# Patient Record
Sex: FEMALE | Race: WHITE | NOT HISPANIC OR LATINO | Employment: FULL TIME | URBAN - METROPOLITAN AREA
[De-identification: names, ages, dates, MRNs, and addresses within clinical notes are randomized per-mention and may not be internally consistent; named-entity substitution may affect disease eponyms.]

---

## 2017-05-01 ENCOUNTER — TRANSCRIBE ORDERS (OUTPATIENT)
Dept: ADMINISTRATIVE | Facility: HOSPITAL | Age: 42
End: 2017-05-01

## 2017-05-01 DIAGNOSIS — R10.9 LEFT FLANK PAIN: Primary | ICD-10-CM

## 2017-05-01 DIAGNOSIS — R30.0 DYSURIA: ICD-10-CM

## 2017-05-01 DIAGNOSIS — Z87.442 HISTORY OF KIDNEY STONES: ICD-10-CM

## 2017-05-04 ENCOUNTER — HOSPITAL ENCOUNTER (OUTPATIENT)
Dept: CT IMAGING | Facility: HOSPITAL | Age: 42
Discharge: HOME | End: 2017-05-04
Attending: FAMILY MEDICINE | Admitting: FAMILY MEDICINE

## 2017-05-04 ENCOUNTER — APPOINTMENT (OUTPATIENT)
Dept: CT IMAGING | Facility: HOSPITAL | Age: 42
End: 2017-05-04
Attending: FAMILY MEDICINE

## 2017-05-04 DIAGNOSIS — R10.9 LEFT FLANK PAIN: ICD-10-CM

## 2017-05-04 DIAGNOSIS — Z87.442 HISTORY OF KIDNEY STONES: ICD-10-CM

## 2017-05-04 DIAGNOSIS — R30.0 DYSURIA: ICD-10-CM

## 2017-05-04 PROCEDURE — 74176 CT ABD & PELVIS W/O CONTRAST: CPT

## 2019-12-09 ENCOUNTER — TRANSCRIBE ORDERS (OUTPATIENT)
Dept: ADMINISTRATIVE | Facility: HOSPITAL | Age: 44
End: 2019-12-09

## 2019-12-09 DIAGNOSIS — N11.9 PYELONEPHRITIS, CHRONIC: Primary | ICD-10-CM

## 2019-12-12 ENCOUNTER — HOSPITAL ENCOUNTER (OUTPATIENT)
Dept: ULTRASOUND IMAGING | Facility: HOSPITAL | Age: 44
Discharge: HOME | End: 2019-12-12
Admitting: INTERNAL MEDICINE

## 2019-12-12 DIAGNOSIS — N11.9 PYELONEPHRITIS, CHRONIC: ICD-10-CM

## 2019-12-12 PROCEDURE — 76775 US EXAM ABDO BACK WALL LIM: CPT

## 2019-12-17 ENCOUNTER — LAB (OUTPATIENT)
Dept: LAB | Facility: HOSPITAL | Age: 44
End: 2019-12-17

## 2019-12-17 ENCOUNTER — TRANSCRIBE ORDERS (OUTPATIENT)
Dept: LAB | Facility: HOSPITAL | Age: 44
End: 2019-12-17

## 2019-12-17 ENCOUNTER — TRANSCRIBE ORDERS (OUTPATIENT)
Dept: ADMINISTRATIVE | Facility: HOSPITAL | Age: 44
End: 2019-12-17

## 2019-12-17 DIAGNOSIS — B95.2 ENTEROCOCCUS FAECALIS INFECTION: ICD-10-CM

## 2019-12-17 DIAGNOSIS — N39.0 URINARY TRACT INFECTION WITH HEMATURIA, SITE UNSPECIFIED: ICD-10-CM

## 2019-12-17 DIAGNOSIS — R10.9 STOMACH ACHE: ICD-10-CM

## 2019-12-17 DIAGNOSIS — N10 ACUTE PYELONEPHRITIS: Primary | ICD-10-CM

## 2019-12-17 DIAGNOSIS — R31.9 URINARY TRACT INFECTION WITH HEMATURIA, SITE UNSPECIFIED: ICD-10-CM

## 2019-12-17 DIAGNOSIS — N11.8: ICD-10-CM

## 2019-12-17 DIAGNOSIS — R10.9 STOMACH ACHE: Primary | ICD-10-CM

## 2019-12-17 LAB
BACTERIA UR QL AUTO: ABNORMAL /HPF
BILIRUB UR QL STRIP: NEGATIVE
CLARITY UR: ABNORMAL
COLOR UR: YELLOW
GLUCOSE UR STRIP-MCNC: NEGATIVE MG/DL
HGB UR QL STRIP.AUTO: NEGATIVE
HYALINE CASTS UR QL AUTO: ABNORMAL /LPF
KETONES UR QL STRIP: NEGATIVE
LEUKOCYTE ESTERASE UR QL STRIP.AUTO: ABNORMAL
NITRITE UR QL STRIP: NEGATIVE
PH UR STRIP.AUTO: 6.5 [PH] (ref 5–8)
PROT UR QL STRIP: ABNORMAL
RBC # UR: ABNORMAL /HPF
REF LAB TEST METHOD: ABNORMAL
SP GR UR STRIP: 1.02 (ref 1–1.03)
SQUAMOUS #/AREA URNS HPF: ABNORMAL /HPF
UROBILINOGEN UR QL STRIP: ABNORMAL
WBC UR QL AUTO: ABNORMAL /HPF

## 2019-12-17 PROCEDURE — 81001 URINALYSIS AUTO W/SCOPE: CPT

## 2019-12-17 PROCEDURE — 87086 URINE CULTURE/COLONY COUNT: CPT

## 2019-12-19 ENCOUNTER — HOSPITAL ENCOUNTER (OUTPATIENT)
Dept: CT IMAGING | Facility: HOSPITAL | Age: 44
Discharge: HOME | End: 2019-12-19
Admitting: INTERNAL MEDICINE

## 2019-12-19 DIAGNOSIS — N10 ACUTE PYELONEPHRITIS: ICD-10-CM

## 2019-12-19 LAB — BACTERIA SPEC AEROBE CULT: NO GROWTH

## 2019-12-19 PROCEDURE — 74177 CT ABD & PELVIS W/CONTRAST: CPT

## 2019-12-19 PROCEDURE — 25010000002 IOPAMIDOL 61 % SOLUTION: Performed by: INTERNAL MEDICINE

## 2019-12-19 RX ADMIN — IOPAMIDOL 100 ML: 612 INJECTION, SOLUTION INTRAVENOUS at 10:40

## 2019-12-22 ENCOUNTER — LAB (OUTPATIENT)
Dept: LAB | Facility: HOSPITAL | Age: 44
End: 2019-12-22

## 2019-12-22 ENCOUNTER — TRANSCRIBE ORDERS (OUTPATIENT)
Dept: LAB | Facility: HOSPITAL | Age: 44
End: 2019-12-22

## 2019-12-22 DIAGNOSIS — R10.9 STOMACH ACHE: ICD-10-CM

## 2019-12-22 DIAGNOSIS — N10 ACUTE PYELONEPHRITIS WITHOUT LESION OF RENAL MEDULLARY NECROSIS: ICD-10-CM

## 2019-12-22 DIAGNOSIS — N39.0 URINARY TRACT INFECTION WITHOUT HEMATURIA, SITE UNSPECIFIED: ICD-10-CM

## 2019-12-22 DIAGNOSIS — R10.9 STOMACH ACHE: Primary | ICD-10-CM

## 2019-12-22 LAB
BACTERIA UR QL AUTO: ABNORMAL /HPF
BILIRUB UR QL STRIP: NEGATIVE
CLARITY UR: CLEAR
COLOR UR: YELLOW
GLUCOSE UR STRIP-MCNC: NEGATIVE MG/DL
HGB UR QL STRIP.AUTO: NEGATIVE
HYALINE CASTS UR QL AUTO: ABNORMAL /LPF
KETONES UR QL STRIP: NEGATIVE
LEUKOCYTE ESTERASE UR QL STRIP.AUTO: ABNORMAL
NITRITE UR QL STRIP: NEGATIVE
PH UR STRIP.AUTO: 6.5 [PH] (ref 5–8)
PROT UR QL STRIP: NEGATIVE
RBC # UR: ABNORMAL /HPF
REF LAB TEST METHOD: ABNORMAL
SP GR UR STRIP: 1.01 (ref 1–1.03)
SQUAMOUS #/AREA URNS HPF: ABNORMAL /HPF
UROBILINOGEN UR QL STRIP: ABNORMAL
WBC UR QL AUTO: ABNORMAL /HPF

## 2019-12-22 PROCEDURE — 81001 URINALYSIS AUTO W/SCOPE: CPT

## 2019-12-22 PROCEDURE — 87086 URINE CULTURE/COLONY COUNT: CPT

## 2019-12-23 ENCOUNTER — LAB (OUTPATIENT)
Dept: LAB | Facility: HOSPITAL | Age: 44
End: 2019-12-23

## 2019-12-23 ENCOUNTER — TRANSCRIBE ORDERS (OUTPATIENT)
Dept: LAB | Facility: HOSPITAL | Age: 44
End: 2019-12-23

## 2019-12-23 DIAGNOSIS — N39.0 URINARY TRACT INFECTION WITHOUT HEMATURIA, SITE UNSPECIFIED: ICD-10-CM

## 2019-12-23 DIAGNOSIS — N11.8: ICD-10-CM

## 2019-12-23 DIAGNOSIS — R10.9 STOMACH ACHE: ICD-10-CM

## 2019-12-23 DIAGNOSIS — B95.2 ENTEROCOCCUS FAECALIS INFECTION: ICD-10-CM

## 2019-12-23 DIAGNOSIS — R10.9 STOMACH ACHE: Primary | ICD-10-CM

## 2019-12-23 LAB
ALBUMIN SERPL-MCNC: 4.4 G/DL (ref 3.5–5.2)
ALBUMIN/GLOB SERPL: 1.4 G/DL
ALP SERPL-CCNC: 59 U/L (ref 39–117)
ALT SERPL W P-5'-P-CCNC: 14 U/L (ref 1–33)
ANION GAP SERPL CALCULATED.3IONS-SCNC: 8 MMOL/L (ref 5–15)
AST SERPL-CCNC: 15 U/L (ref 1–32)
BACTERIA SPEC AEROBE CULT: NO GROWTH
BASOPHILS # BLD AUTO: 0.05 10*3/MM3 (ref 0–0.2)
BASOPHILS NFR BLD AUTO: 0.6 % (ref 0–1.5)
BILIRUB SERPL-MCNC: 0.5 MG/DL (ref 0.2–1.2)
BUN BLD-MCNC: 7 MG/DL (ref 6–20)
BUN/CREAT SERPL: 7.3 (ref 7–25)
CALCIUM SPEC-SCNC: 9.9 MG/DL (ref 8.6–10.5)
CHLORIDE SERPL-SCNC: 102 MMOL/L (ref 98–107)
CO2 SERPL-SCNC: 27 MMOL/L (ref 22–29)
CREAT BLD-MCNC: 0.96 MG/DL (ref 0.57–1)
CRP SERPL-MCNC: 0.22 MG/DL (ref 0–0.5)
DEPRECATED RDW RBC AUTO: 42.2 FL (ref 37–54)
EOSINOPHIL # BLD AUTO: 0.11 10*3/MM3 (ref 0–0.4)
EOSINOPHIL NFR BLD AUTO: 1.4 % (ref 0.3–6.2)
ERYTHROCYTE [DISTWIDTH] IN BLOOD BY AUTOMATED COUNT: 12.4 % (ref 12.3–15.4)
ERYTHROCYTE [SEDIMENTATION RATE] IN BLOOD: 25 MM/HR (ref 0–20)
GFR SERPL CREATININE-BSD FRML MDRD: 63 ML/MIN/1.73
GLOBULIN UR ELPH-MCNC: 3.1 GM/DL
GLUCOSE BLD-MCNC: 101 MG/DL (ref 65–99)
HCT VFR BLD AUTO: 45.1 % (ref 34–46.6)
HGB BLD-MCNC: 14.4 G/DL (ref 12–15.9)
IMM GRANULOCYTES # BLD AUTO: 0.02 10*3/MM3 (ref 0–0.05)
IMM GRANULOCYTES NFR BLD AUTO: 0.2 % (ref 0–0.5)
LYMPHOCYTES # BLD AUTO: 2.26 10*3/MM3 (ref 0.7–3.1)
LYMPHOCYTES NFR BLD AUTO: 28.2 % (ref 19.6–45.3)
MCH RBC QN AUTO: 29.4 PG (ref 26.6–33)
MCHC RBC AUTO-ENTMCNC: 31.9 G/DL (ref 31.5–35.7)
MCV RBC AUTO: 92.2 FL (ref 79–97)
MONOCYTES # BLD AUTO: 0.45 10*3/MM3 (ref 0.1–0.9)
MONOCYTES NFR BLD AUTO: 5.6 % (ref 5–12)
NEUTROPHILS # BLD AUTO: 5.12 10*3/MM3 (ref 1.7–7)
NEUTROPHILS NFR BLD AUTO: 64 % (ref 42.7–76)
NRBC BLD AUTO-RTO: 0 /100 WBC (ref 0–0.2)
PLATELET # BLD AUTO: 372 10*3/MM3 (ref 140–450)
PMV BLD AUTO: 10.7 FL (ref 6–12)
POTASSIUM BLD-SCNC: 4.3 MMOL/L (ref 3.5–5.2)
PROT SERPL-MCNC: 7.5 G/DL (ref 6–8.5)
RBC # BLD AUTO: 4.89 10*6/MM3 (ref 3.77–5.28)
SODIUM BLD-SCNC: 137 MMOL/L (ref 136–145)
WBC NRBC COR # BLD: 8.01 10*3/MM3 (ref 3.4–10.8)

## 2019-12-23 PROCEDURE — 85652 RBC SED RATE AUTOMATED: CPT

## 2019-12-23 PROCEDURE — 36415 COLL VENOUS BLD VENIPUNCTURE: CPT

## 2019-12-23 PROCEDURE — 80053 COMPREHEN METABOLIC PANEL: CPT

## 2019-12-23 PROCEDURE — 85025 COMPLETE CBC W/AUTO DIFF WBC: CPT

## 2019-12-23 PROCEDURE — 86140 C-REACTIVE PROTEIN: CPT

## 2020-01-08 ENCOUNTER — TRANSCRIBE ORDERS (OUTPATIENT)
Dept: LAB | Facility: HOSPITAL | Age: 45
End: 2020-01-08

## 2020-01-08 ENCOUNTER — LAB (OUTPATIENT)
Dept: LAB | Facility: HOSPITAL | Age: 45
End: 2020-01-08

## 2020-01-08 DIAGNOSIS — B95.2 ENTEROCOCCUS FAECALIS INFECTION: ICD-10-CM

## 2020-01-08 DIAGNOSIS — N39.0 URINARY TRACT INFECTION WITH HEMATURIA, SITE UNSPECIFIED: Primary | ICD-10-CM

## 2020-01-08 DIAGNOSIS — R31.9 URINARY TRACT INFECTION WITH HEMATURIA, SITE UNSPECIFIED: ICD-10-CM

## 2020-01-08 DIAGNOSIS — N39.0 URINARY TRACT INFECTION WITH HEMATURIA, SITE UNSPECIFIED: ICD-10-CM

## 2020-01-08 DIAGNOSIS — R31.9 URINARY TRACT INFECTION WITH HEMATURIA, SITE UNSPECIFIED: Primary | ICD-10-CM

## 2020-01-08 LAB
BACTERIA UR QL AUTO: NORMAL /HPF
BILIRUB UR QL STRIP: NEGATIVE
CLARITY UR: CLEAR
COLOR UR: YELLOW
DEPRECATED RDW RBC AUTO: 41.8 FL (ref 37–54)
ERYTHROCYTE [DISTWIDTH] IN BLOOD BY AUTOMATED COUNT: 12.6 % (ref 12.3–15.4)
GLUCOSE UR STRIP-MCNC: NEGATIVE MG/DL
HCT VFR BLD AUTO: 44 % (ref 34–46.6)
HGB BLD-MCNC: 13.8 G/DL (ref 12–15.9)
HGB UR QL STRIP.AUTO: ABNORMAL
HYALINE CASTS UR QL AUTO: NORMAL /LPF
KETONES UR QL STRIP: NEGATIVE
LEUKOCYTE ESTERASE UR QL STRIP.AUTO: NEGATIVE
MCH RBC QN AUTO: 28.5 PG (ref 26.6–33)
MCHC RBC AUTO-ENTMCNC: 31.4 G/DL (ref 31.5–35.7)
MCV RBC AUTO: 90.9 FL (ref 79–97)
NITRITE UR QL STRIP: NEGATIVE
PH UR STRIP.AUTO: 7 [PH] (ref 5–8)
PLATELET # BLD AUTO: 343 10*3/MM3 (ref 140–450)
PMV BLD AUTO: 11.3 FL (ref 6–12)
PROT UR QL STRIP: NEGATIVE
RBC # BLD AUTO: 4.84 10*6/MM3 (ref 3.77–5.28)
RBC # UR: NORMAL /HPF
REF LAB TEST METHOD: NORMAL
SP GR UR STRIP: 1.01 (ref 1–1.03)
SQUAMOUS #/AREA URNS HPF: NORMAL /HPF
UROBILINOGEN UR QL STRIP: ABNORMAL
WBC NRBC COR # BLD: 7.24 10*3/MM3 (ref 3.4–10.8)
WBC UR QL AUTO: NORMAL /HPF

## 2020-01-08 PROCEDURE — 85027 COMPLETE CBC AUTOMATED: CPT

## 2020-01-08 PROCEDURE — 36415 COLL VENOUS BLD VENIPUNCTURE: CPT

## 2020-01-08 PROCEDURE — 87086 URINE CULTURE/COLONY COUNT: CPT

## 2020-01-08 PROCEDURE — 81001 URINALYSIS AUTO W/SCOPE: CPT

## 2020-01-08 PROCEDURE — 87186 SC STD MICRODIL/AGAR DIL: CPT

## 2020-01-08 PROCEDURE — 87077 CULTURE AEROBIC IDENTIFY: CPT

## 2020-01-09 ENCOUNTER — HOSPITAL ENCOUNTER (EMERGENCY)
Facility: HOSPITAL | Age: 45
Discharge: HOME | End: 2020-01-09
Attending: EMERGENCY MEDICINE | Admitting: EMERGENCY MEDICINE

## 2020-01-09 ENCOUNTER — APPOINTMENT (OUTPATIENT)
Dept: CT IMAGING | Facility: HOSPITAL | Age: 45
End: 2020-01-09

## 2020-01-09 VITALS
DIASTOLIC BLOOD PRESSURE: 94 MMHG | HEART RATE: 71 BPM | TEMPERATURE: 97.6 F | SYSTOLIC BLOOD PRESSURE: 155 MMHG | WEIGHT: 195 LBS | RESPIRATION RATE: 18 BRPM | HEIGHT: 67 IN | OXYGEN SATURATION: 100 % | BODY MASS INDEX: 30.61 KG/M2

## 2020-01-09 DIAGNOSIS — R79.89 ELEVATED SERUM CREATININE: ICD-10-CM

## 2020-01-09 DIAGNOSIS — D72.829 LEUKOCYTOSIS, UNSPECIFIED TYPE: ICD-10-CM

## 2020-01-09 DIAGNOSIS — R82.81 PYURIA: Primary | ICD-10-CM

## 2020-01-09 DIAGNOSIS — R10.9 FLANK PAIN: ICD-10-CM

## 2020-01-09 DIAGNOSIS — R30.0 DYSURIA: ICD-10-CM

## 2020-01-09 LAB
ALBUMIN SERPL-MCNC: 4.5 G/DL (ref 3.5–5.2)
ALBUMIN/GLOB SERPL: 1.4 G/DL
ALP SERPL-CCNC: 60 U/L (ref 39–117)
ALT SERPL W P-5'-P-CCNC: 21 U/L (ref 1–33)
ANION GAP SERPL CALCULATED.3IONS-SCNC: 12 MMOL/L (ref 5–15)
AST SERPL-CCNC: 18 U/L (ref 1–32)
B-HCG UR QL: NEGATIVE
BACTERIA UR QL AUTO: ABNORMAL /HPF
BASOPHILS # BLD AUTO: 0.06 10*3/MM3 (ref 0–0.2)
BASOPHILS NFR BLD AUTO: 0.4 % (ref 0–1.5)
BILIRUB SERPL-MCNC: 0.5 MG/DL (ref 0.2–1.2)
BILIRUB UR QL STRIP: NEGATIVE
BUN BLD-MCNC: 10 MG/DL (ref 6–20)
BUN/CREAT SERPL: 8.1 (ref 7–25)
CALCIUM SPEC-SCNC: 9.6 MG/DL (ref 8.6–10.5)
CHLORIDE SERPL-SCNC: 100 MMOL/L (ref 98–107)
CLARITY UR: CLEAR
CO2 SERPL-SCNC: 24 MMOL/L (ref 22–29)
COLOR UR: YELLOW
CREAT BLD-MCNC: 1.24 MG/DL (ref 0.57–1)
D-LACTATE SERPL-SCNC: 0.7 MMOL/L (ref 0.5–2)
DEPRECATED RDW RBC AUTO: 41.4 FL (ref 37–54)
EOSINOPHIL # BLD AUTO: 0.15 10*3/MM3 (ref 0–0.4)
EOSINOPHIL NFR BLD AUTO: 1.1 % (ref 0.3–6.2)
ERYTHROCYTE [DISTWIDTH] IN BLOOD BY AUTOMATED COUNT: 12.6 % (ref 12.3–15.4)
GFR SERPL CREATININE-BSD FRML MDRD: 47 ML/MIN/1.73
GLOBULIN UR ELPH-MCNC: 3.2 GM/DL
GLUCOSE BLD-MCNC: 89 MG/DL (ref 65–99)
GLUCOSE UR STRIP-MCNC: NEGATIVE MG/DL
HCT VFR BLD AUTO: 42.3 % (ref 34–46.6)
HGB BLD-MCNC: 13.9 G/DL (ref 12–15.9)
HGB UR QL STRIP.AUTO: ABNORMAL
HYALINE CASTS UR QL AUTO: ABNORMAL /LPF
IMM GRANULOCYTES # BLD AUTO: 0.04 10*3/MM3 (ref 0–0.05)
IMM GRANULOCYTES NFR BLD AUTO: 0.3 % (ref 0–0.5)
INTERNAL NEGATIVE CONTROL: NEGATIVE
INTERNAL POSITIVE CONTROL: POSITIVE
KETONES UR QL STRIP: NEGATIVE
LEUKOCYTE ESTERASE UR QL STRIP.AUTO: ABNORMAL
LYMPHOCYTES # BLD AUTO: 3.06 10*3/MM3 (ref 0.7–3.1)
LYMPHOCYTES NFR BLD AUTO: 22.9 % (ref 19.6–45.3)
Lab: ABNORMAL
MCH RBC QN AUTO: 29.7 PG (ref 26.6–33)
MCHC RBC AUTO-ENTMCNC: 32.9 G/DL (ref 31.5–35.7)
MCV RBC AUTO: 90.4 FL (ref 79–97)
MONOCYTES # BLD AUTO: 0.96 10*3/MM3 (ref 0.1–0.9)
MONOCYTES NFR BLD AUTO: 7.2 % (ref 5–12)
NEUTROPHILS # BLD AUTO: 9.1 10*3/MM3 (ref 1.7–7)
NEUTROPHILS NFR BLD AUTO: 68.1 % (ref 42.7–76)
NITRITE UR QL STRIP: NEGATIVE
NRBC BLD AUTO-RTO: 0 /100 WBC (ref 0–0.2)
PH UR STRIP.AUTO: 6.5 [PH] (ref 5–8)
PLATELET # BLD AUTO: 375 10*3/MM3 (ref 140–450)
PMV BLD AUTO: 10.9 FL (ref 6–12)
POTASSIUM BLD-SCNC: 3.7 MMOL/L (ref 3.5–5.2)
PROT SERPL-MCNC: 7.7 G/DL (ref 6–8.5)
PROT UR QL STRIP: NEGATIVE
RBC # BLD AUTO: 4.68 10*6/MM3 (ref 3.77–5.28)
RBC # UR: ABNORMAL /HPF
REF LAB TEST METHOD: ABNORMAL
SODIUM BLD-SCNC: 136 MMOL/L (ref 136–145)
SP GR UR STRIP: <=1.005 (ref 1–1.03)
SQUAMOUS #/AREA URNS HPF: ABNORMAL /HPF
UROBILINOGEN UR QL STRIP: ABNORMAL
WBC NRBC COR # BLD: 13.37 10*3/MM3 (ref 3.4–10.8)
WBC UR QL AUTO: ABNORMAL /HPF

## 2020-01-09 PROCEDURE — 25010000002 CEFTRIAXONE PER 250 MG: Performed by: PHYSICIAN ASSISTANT

## 2020-01-09 PROCEDURE — 87077 CULTURE AEROBIC IDENTIFY: CPT | Performed by: EMERGENCY MEDICINE

## 2020-01-09 PROCEDURE — 81025 URINE PREGNANCY TEST: CPT | Performed by: EMERGENCY MEDICINE

## 2020-01-09 PROCEDURE — 36415 COLL VENOUS BLD VENIPUNCTURE: CPT

## 2020-01-09 PROCEDURE — 87086 URINE CULTURE/COLONY COUNT: CPT | Performed by: EMERGENCY MEDICINE

## 2020-01-09 PROCEDURE — 96365 THER/PROPH/DIAG IV INF INIT: CPT

## 2020-01-09 PROCEDURE — 81001 URINALYSIS AUTO W/SCOPE: CPT | Performed by: EMERGENCY MEDICINE

## 2020-01-09 PROCEDURE — 74176 CT ABD & PELVIS W/O CONTRAST: CPT

## 2020-01-09 PROCEDURE — 99283 EMERGENCY DEPT VISIT LOW MDM: CPT

## 2020-01-09 PROCEDURE — 85025 COMPLETE CBC W/AUTO DIFF WBC: CPT | Performed by: EMERGENCY MEDICINE

## 2020-01-09 PROCEDURE — 83605 ASSAY OF LACTIC ACID: CPT | Performed by: PHYSICIAN ASSISTANT

## 2020-01-09 PROCEDURE — 87040 BLOOD CULTURE FOR BACTERIA: CPT | Performed by: PHYSICIAN ASSISTANT

## 2020-01-09 PROCEDURE — 80053 COMPREHEN METABOLIC PANEL: CPT | Performed by: EMERGENCY MEDICINE

## 2020-01-09 PROCEDURE — 87186 SC STD MICRODIL/AGAR DIL: CPT | Performed by: EMERGENCY MEDICINE

## 2020-01-09 RX ORDER — ONDANSETRON 4 MG/1
4 TABLET, ORALLY DISINTEGRATING ORAL EVERY 8 HOURS PRN
Qty: 14 TABLET | Refills: 0 | Status: SHIPPED | OUTPATIENT
Start: 2020-01-09 | End: 2020-05-21

## 2020-01-09 RX ORDER — ONDANSETRON 2 MG/ML
4 INJECTION INTRAMUSCULAR; INTRAVENOUS ONCE
Status: DISCONTINUED | OUTPATIENT
Start: 2020-01-09 | End: 2020-01-09 | Stop reason: HOSPADM

## 2020-01-09 RX ORDER — PHENAZOPYRIDINE HYDROCHLORIDE 100 MG/1
100 TABLET, FILM COATED ORAL 3 TIMES DAILY PRN
Qty: 6 TABLET | Refills: 0 | Status: SHIPPED | OUTPATIENT
Start: 2020-01-09 | End: 2020-01-11

## 2020-01-09 RX ADMIN — CEFTRIAXONE 1 G: 1 INJECTION, POWDER, FOR SOLUTION INTRAMUSCULAR; INTRAVENOUS at 17:09

## 2020-01-09 RX ADMIN — SODIUM CHLORIDE 1000 ML: 9 INJECTION, SOLUTION INTRAVENOUS at 17:08

## 2020-01-09 RX ADMIN — SODIUM CHLORIDE 1000 ML: 9 INJECTION, SOLUTION INTRAVENOUS at 15:44

## 2020-01-09 NOTE — DISCHARGE INSTRUCTIONS
You have been seen for dysuria.  She did have some white blood cells in your urine today.  Please follow-up with Dr. soler at your appointment scheduled for tomorrow.  Return here if you have any fever, vomiting, increased pain.  We will defer outpatient antibiotic selection to Dr. Meng.

## 2020-01-09 NOTE — ED PROVIDER NOTES
Subjective   Halle Rea is a 44 y.o. female who presents to the ED with complaints of left flank pain worsening since 0900 this morning. She describes the pain as a pressure like sensation. She also complains of nausea, dysuria, and urinary frequency. However, she denies any associated fever or vomiting. She states that she has been drinking more fluids without any relief. Of note, she was being treated for similar symptoms by [facility]. She reports that she just finished a course of Rocephin 6 days ago. She states she has a follow up appointment with Dr. Meng tomorrow. She has a history of asthma. Her past surgical history includes an oophorectomy and a ovarian cyst drainage/excision. She denies any history of smoking, drinking, or drug use. Her PCP is Dr. Brown. There are no other acute complaints at this time.      History provided by:  Patient  Flank Pain   Pain location:  L flank  Pain radiates to:  Does not radiate  Pain severity:  Severe  Onset quality:  Sudden  Duration:  1 day  Timing:  Constant  Progression:  Worsening  Chronicity:  Recurrent  Relieved by:  None tried  Worsened by:  Nothing  Ineffective treatments:  None tried  Associated symptoms: dysuria and nausea    Associated symptoms: no chills, no fever and no vomiting    Risk factors: obesity        Review of Systems   Constitutional: Negative for chills and fever.   Gastrointestinal: Positive for nausea. Negative for vomiting.   Genitourinary: Positive for dysuria, flank pain (left) and frequency.   All other systems reviewed and are negative.      Past Medical History:   Diagnosis Date   • Asthma    • Seasonal allergies    • Sepsis (CMS/[facility])    • Urinary tract infection        Allergies   Allergen Reactions   • Sulfa Antibiotics Other (See Comments)     Loses voice reaction as child       Past Surgical History:   Procedure Laterality Date   • DIAGNOSTIC LAPAROSCOPY EXPLORATORY LAPAROTOMY N/A 7/20/2016    Procedure: DIAGNOSTIC  LAPAROSCOPY EXPLORATORY LAPAROTOMY;  Surgeon: Katherine Hernandez MD;  Location: [facility] OR;  Service:    • MOUTH SURGERY     • OVARIAN CYST DRAINAGE/EXCISION N/A 7/20/2016    Procedure: DIAGNOSTIC LAPAROSCOPY, OVARIAN CYSTECTOMY POSSIBLE OOPHORECTOMY, POSSIBLE LASER;  Surgeon: Katherine Hernandez MD;  Location: [facility] OR;  Service:        History reviewed. No pertinent family history.    Social History     Socioeconomic History   • Marital status:      Spouse name: Not on file   • Number of children: Not on file   • Years of education: Not on file   • Highest education level: Not on file   Tobacco Use   • Smoking status: Never Smoker   Substance and Sexual Activity   • Alcohol use: Yes     Alcohol/week: 2.0 standard drinks     Types: 1 Glasses of wine, 1 Cans of beer per week     Comment: usually one per month   • Drug use: No   • Sexual activity: Yes     Partners: Female         Objective   Physical Exam   Constitutional: She is oriented to person, place, and time. She appears well-developed and well-nourished.   HENT:   Head: Normocephalic and atraumatic.   Nose: Nose normal.   Eyes: Conjunctivae are normal. No scleral icterus.   Neck: Normal range of motion. Neck supple.   Cardiovascular: Normal rate, regular rhythm and normal heart sounds. Exam reveals no gallop and no friction rub.   No murmur heard.  Pulmonary/Chest: Effort normal and breath sounds normal. No stridor. No respiratory distress. She has no wheezes.   Clear to auscultation.   Abdominal: Soft. Bowel sounds are normal. She exhibits no distension. There is tenderness (left sided). There is no rebound and no guarding.   Left sided abdominal tenderness. No CVA tenderness.   Musculoskeletal: Normal range of motion. She exhibits no deformity.   Neurological: She is alert and oriented to person, place, and time.   Skin: Skin is warm and dry.   Psychiatric: She has a normal mood and affect. Her behavior is normal.   Nursing note and vitals  reviewed.      Procedures         ED Course  ED Course as of Jan 09 2340   Thu Jan 09, 2020   1539 Paged Dr. Meng, ID. -WT    [NP]   1549 Patient presents complaining of dysuria and left leg pain.  She reports that she has been treated for multiple UTIs over the past few months.  She just completed a course of Rocephin with infectious disease, Dr. Meng on Friday.  She did see him yesterday and had repeat labs and urinalysis.  Her urine culture was positive for gram-negative bacilli.  She had some transient tachycardia while in the emergency department.  I have ordered an IV fluid bolus    [WT]   1552 Discussed the case with Dr. Meng, ID. -WT    [NP]   1609 WBC(!): 13.37 [WT]   1609 Leukocytes, UA(!): Small (1+) [WT]   1643 Creatinine(!): 1.24 [WT]   1655 WBC, UA(!): 6-12 [WT]   1655 Bacteria, UA: None Seen [WT]   1705 Dr. Meng States I may give Rocephin if the urinalysis looks changed from yesterday and he will follow the patient tomorrow and make any further plans as far as outpatient antibiotics.    [WT]   1706 Patient has elevated creatinine and white blood cell count, given her unilateral pain will repeat CT abdomen and pelvis today.      [WT]   1826 Ctap  IMPRESSION:  No acute intra-abdominal or intrapelvic abnormality  specifically no evidence for obstructive uropathy or urolithiasis. No  loculated fluid collection or mechanical obstructive findings.    [WT]   1828 At bedside re-evaluating the patient and updating her on labs/imaging, as well as, follow up instructions. -WT    [NP]   2339 CT abdomen pelvis is within normal limits.  Patient was given 1 dose of Rocephin and has follow-up with Dr. Meng in the morning at 10 AM.  She understands return precautions and is agreeable to plan.    [WT]      ED Course User Index  [NP] Molly Waldrop  [WT] Jessie Odom, PAGirishC       Recent Results (from the past 24 hour(s))   Urinalysis With Culture If Indicated - Urine, Clean Catch    Collection Time: 01/09/20  3:24  PM   Result Value Ref Range    Color, UA Yellow Yellow, Straw    Appearance, UA Clear Clear    pH, UA 6.5 5.0 - 8.0    Specific Gravity, UA <=1.005 1.001 - 1.030    Glucose, UA Negative Negative    Ketones, UA Negative Negative    Bilirubin, UA Negative Negative    Blood, UA Small (1+) (A) Negative    Protein, UA Negative Negative    Leuk Esterase, UA Small (1+) (A) Negative    Nitrite, UA Negative Negative    Urobilinogen, UA 0.2 E.U./dL 0.2 - 1.0 E.U./dL   Urinalysis, Microscopic Only - Urine, Clean Catch    Collection Time: 01/09/20  3:24 PM   Result Value Ref Range    RBC, UA 3-6 (A) None Seen, 0-2 /HPF    WBC, UA 6-12 (A) None Seen, 0-2 /HPF    Bacteria, UA None Seen None Seen, Trace /HPF    Squamous Epithelial Cells, UA 0-2 None Seen, 0-2 /HPF    Hyaline Casts, UA 0-6 0 - 6 /LPF    Methodology Manual Light Microscopy    Comprehensive Metabolic Panel    Collection Time: 01/09/20  3:39 PM   Result Value Ref Range    Glucose 89 65 - 99 mg/dL    BUN 10 6 - 20 mg/dL    Creatinine 1.24 (H) 0.57 - 1.00 mg/dL    Sodium 136 136 - 145 mmol/L    Potassium 3.7 3.5 - 5.2 mmol/L    Chloride 100 98 - 107 mmol/L    CO2 24.0 22.0 - 29.0 mmol/L    Calcium 9.6 8.6 - 10.5 mg/dL    Total Protein 7.7 6.0 - 8.5 g/dL    Albumin 4.50 3.50 - 5.20 g/dL    ALT (SGPT) 21 1 - 33 U/L    AST (SGOT) 18 1 - 32 U/L    Alkaline Phosphatase 60 39 - 117 U/L    Total Bilirubin 0.5 0.2 - 1.2 mg/dL    eGFR Non African Amer 47 (L) >60 mL/min/1.73    Globulin 3.2 gm/dL    A/G Ratio 1.4 g/dL    BUN/Creatinine Ratio 8.1 7.0 - 25.0    Anion Gap 12.0 5.0 - 15.0 mmol/L   CBC Auto Differential    Collection Time: 01/09/20  3:39 PM   Result Value Ref Range    WBC 13.37 (H) 3.40 - 10.80 10*3/mm3    RBC 4.68 3.77 - 5.28 10*6/mm3    Hemoglobin 13.9 12.0 - 15.9 g/dL    Hematocrit 42.3 34.0 - 46.6 %    MCV 90.4 79.0 - 97.0 fL    MCH 29.7 26.6 - 33.0 pg    MCHC 32.9 31.5 - 35.7 g/dL    RDW 12.6 12.3 - 15.4 %    RDW-SD 41.4 37.0 - 54.0 fl    MPV 10.9 6.0 - 12.0 fL  "   Platelets 375 140 - 450 10*3/mm3    Neutrophil % 68.1 42.7 - 76.0 %    Lymphocyte % 22.9 19.6 - 45.3 %    Monocyte % 7.2 5.0 - 12.0 %    Eosinophil % 1.1 0.3 - 6.2 %    Basophil % 0.4 0.0 - 1.5 %    Immature Grans % 0.3 0.0 - 0.5 %    Neutrophils, Absolute 9.10 (H) 1.70 - 7.00 10*3/mm3    Lymphocytes, Absolute 3.06 0.70 - 3.10 10*3/mm3    Monocytes, Absolute 0.96 (H) 0.10 - 0.90 10*3/mm3    Eosinophils, Absolute 0.15 0.00 - 0.40 10*3/mm3    Basophils, Absolute 0.06 0.00 - 0.20 10*3/mm3    Immature Grans, Absolute 0.04 0.00 - 0.05 10*3/mm3    nRBC 0.0 0.0 - 0.2 /100 WBC   Lactic Acid, Plasma    Collection Time: 01/09/20  4:29 PM   Result Value Ref Range    Lactate 0.7 0.5 - 2.0 mmol/L   POC Pregnancy, Urine    Collection Time: 01/09/20  5:15 PM   Result Value Ref Range    HCG, Urine, QL Negative Negative    Lot Number [id number]     Internal Positive Control Positive     Internal Negative Control Negative      Note: In addition to lab results from this visit, the labs listed above may include labs taken at another facility or during a different encounter within the last 24 hours. Please correlate lab times with ED admission and discharge times for further clarification of the services performed during this visit.    CT Abdomen Pelvis Without Contrast   Preliminary Result   No acute intra-abdominal or intrapelvic abnormality,   specifically no evidence for obstructive uropathy or urolithiasis. No   loculated fluid collection or mechanical obstructive findings.       DICTATED:   01/09/2020   EDITED/ls :   01/09/2020             Vitals:    01/09/20 1354 01/09/20 1525 01/09/20 1530 01/09/20 1912   BP: 166/84 (!) 174/104 147/87 155/94   BP Location:  Right arm     Patient Position:  Standing     Pulse: 84 (!) 130 75 71   Resp: 18 18 18 18   Temp: 97.6 °F (36.4 °C)      SpO2: 100% 99% 100% 100%   Weight: 88.5 kg (195 lb)      Height: 170.2 cm (67\")        Medications   sodium chloride 0.9 % bolus 1,000 mL (0 mL " Intravenous Stopped 1/9/20 1707)   cefTRIAXone (ROCEPHIN) 1 g/100 mL 0.9% NS (MBP) (0 g Intravenous Stopped 1/9/20 1739)   sodium chloride 0.9 % bolus 1,000 mL (0 mL Intravenous Stopped 1/9/20 1910)     ECG/EMG Results (last 24 hours)     ** No results found for the last 24 hours. **        No orders to display                   MDM  Number of Diagnoses or Management Options  Dysuria:   Elevated serum creatinine:   Flank pain:   Leukocytosis, unspecified type:   Pyuria:      Amount and/or Complexity of Data Reviewed  Clinical lab tests: reviewed  Decide to obtain previous medical records or to obtain history from someone other than the patient: yes        Final diagnoses:   Pyuria   Dysuria   Elevated serum creatinine   Leukocytosis, unspecified type   Flank pain       Documentation assistance provided by meka Waldrop.  Information recorded by the scribe was done at my direction and has been verified and validated by me.          Molly Waldrop  01/09/20 1829       Jessie Odom PA-C  01/09/20 [id number]       Jessie Odom PA-C  01/09/20 6882

## 2020-01-10 LAB — BACTERIA SPEC AEROBE CULT: ABNORMAL

## 2020-01-11 ENCOUNTER — TELEPHONE (OUTPATIENT)
Dept: EMERGENCY DEPT | Facility: HOSPITAL | Age: 45
End: 2020-01-11

## 2020-01-11 LAB — BACTERIA SPEC AEROBE CULT: ABNORMAL

## 2020-01-13 ENCOUNTER — LAB (OUTPATIENT)
Dept: LAB | Facility: HOSPITAL | Age: 45
End: 2020-01-13

## 2020-01-13 ENCOUNTER — TRANSCRIBE ORDERS (OUTPATIENT)
Dept: LAB | Facility: HOSPITAL | Age: 45
End: 2020-01-13

## 2020-01-13 DIAGNOSIS — N39.0 URINARY TRACT INFECTION WITHOUT HEMATURIA, SITE UNSPECIFIED: Primary | ICD-10-CM

## 2020-01-13 DIAGNOSIS — N39.0 URINARY TRACT INFECTION WITHOUT HEMATURIA, SITE UNSPECIFIED: ICD-10-CM

## 2020-01-13 LAB
ALBUMIN SERPL-MCNC: 4.6 G/DL (ref 3.5–5.2)
ALBUMIN/GLOB SERPL: 1.4 G/DL
ALP SERPL-CCNC: 64 U/L (ref 39–117)
ALT SERPL W P-5'-P-CCNC: 18 U/L (ref 1–33)
ANION GAP SERPL CALCULATED.3IONS-SCNC: 12 MMOL/L (ref 5–15)
AST SERPL-CCNC: 23 U/L (ref 1–32)
BASOPHILS # BLD AUTO: 0.07 10*3/MM3 (ref 0–0.2)
BASOPHILS NFR BLD AUTO: 0.7 % (ref 0–1.5)
BILIRUB SERPL-MCNC: 0.3 MG/DL (ref 0.2–1.2)
BUN BLD-MCNC: 9 MG/DL (ref 6–20)
BUN/CREAT SERPL: 9.3 (ref 7–25)
CALCIUM SPEC-SCNC: 9.7 MG/DL (ref 8.6–10.5)
CHLORIDE SERPL-SCNC: 101 MMOL/L (ref 98–107)
CO2 SERPL-SCNC: 28 MMOL/L (ref 22–29)
CREAT BLD-MCNC: 0.97 MG/DL (ref 0.57–1)
CRP SERPL-MCNC: 0.14 MG/DL (ref 0–0.5)
DEPRECATED RDW RBC AUTO: 41.8 FL (ref 37–54)
EOSINOPHIL # BLD AUTO: 0.15 10*3/MM3 (ref 0–0.4)
EOSINOPHIL NFR BLD AUTO: 1.4 % (ref 0.3–6.2)
ERYTHROCYTE [DISTWIDTH] IN BLOOD BY AUTOMATED COUNT: 12.6 % (ref 12.3–15.4)
ERYTHROCYTE [SEDIMENTATION RATE] IN BLOOD: 11 MM/HR (ref 0–20)
GFR SERPL CREATININE-BSD FRML MDRD: 63 ML/MIN/1.73
GLOBULIN UR ELPH-MCNC: 3.3 GM/DL
GLUCOSE BLD-MCNC: 122 MG/DL (ref 65–99)
HCT VFR BLD AUTO: 41.8 % (ref 34–46.6)
HGB BLD-MCNC: 13.6 G/DL (ref 12–15.9)
IMM GRANULOCYTES # BLD AUTO: 0.03 10*3/MM3 (ref 0–0.05)
IMM GRANULOCYTES NFR BLD AUTO: 0.3 % (ref 0–0.5)
LYMPHOCYTES # BLD AUTO: 2.82 10*3/MM3 (ref 0.7–3.1)
LYMPHOCYTES NFR BLD AUTO: 27 % (ref 19.6–45.3)
MCH RBC QN AUTO: 29.6 PG (ref 26.6–33)
MCHC RBC AUTO-ENTMCNC: 32.5 G/DL (ref 31.5–35.7)
MCV RBC AUTO: 91.1 FL (ref 79–97)
MONOCYTES # BLD AUTO: 0.59 10*3/MM3 (ref 0.1–0.9)
MONOCYTES NFR BLD AUTO: 5.6 % (ref 5–12)
NEUTROPHILS # BLD AUTO: 6.79 10*3/MM3 (ref 1.7–7)
NEUTROPHILS NFR BLD AUTO: 65 % (ref 42.7–76)
NRBC BLD AUTO-RTO: 0 /100 WBC (ref 0–0.2)
PLATELET # BLD AUTO: 382 10*3/MM3 (ref 140–450)
PMV BLD AUTO: 11 FL (ref 6–12)
POTASSIUM BLD-SCNC: 3.9 MMOL/L (ref 3.5–5.2)
PROT SERPL-MCNC: 7.9 G/DL (ref 6–8.5)
RBC # BLD AUTO: 4.59 10*6/MM3 (ref 3.77–5.28)
SODIUM BLD-SCNC: 141 MMOL/L (ref 136–145)
WBC NRBC COR # BLD: 10.45 10*3/MM3 (ref 3.4–10.8)

## 2020-01-13 PROCEDURE — 86140 C-REACTIVE PROTEIN: CPT

## 2020-01-13 PROCEDURE — 36415 COLL VENOUS BLD VENIPUNCTURE: CPT

## 2020-01-13 PROCEDURE — 80053 COMPREHEN METABOLIC PANEL: CPT

## 2020-01-13 PROCEDURE — 85652 RBC SED RATE AUTOMATED: CPT

## 2020-01-13 PROCEDURE — 85025 COMPLETE CBC W/AUTO DIFF WBC: CPT

## 2020-01-14 LAB
BACTERIA SPEC AEROBE CULT: NORMAL
BACTERIA SPEC AEROBE CULT: NORMAL

## 2020-01-20 ENCOUNTER — TRANSCRIBE ORDERS (OUTPATIENT)
Dept: LAB | Facility: HOSPITAL | Age: 45
End: 2020-01-20

## 2020-01-20 ENCOUNTER — LAB (OUTPATIENT)
Dept: LAB | Facility: HOSPITAL | Age: 45
End: 2020-01-20

## 2020-01-20 DIAGNOSIS — R31.9 URINARY TRACT INFECTION WITH HEMATURIA, SITE UNSPECIFIED: Primary | ICD-10-CM

## 2020-01-20 DIAGNOSIS — R31.9 URINARY TRACT INFECTION WITH HEMATURIA, SITE UNSPECIFIED: ICD-10-CM

## 2020-01-20 DIAGNOSIS — N39.0 URINARY TRACT INFECTION WITH HEMATURIA, SITE UNSPECIFIED: Primary | ICD-10-CM

## 2020-01-20 DIAGNOSIS — N39.0 URINARY TRACT INFECTION WITH HEMATURIA, SITE UNSPECIFIED: ICD-10-CM

## 2020-01-20 LAB
ALBUMIN SERPL-MCNC: 4.7 G/DL (ref 3.5–5.2)
ALBUMIN/GLOB SERPL: 1.5 G/DL
ALP SERPL-CCNC: 58 U/L (ref 39–117)
ALT SERPL W P-5'-P-CCNC: 25 U/L (ref 1–33)
ANION GAP SERPL CALCULATED.3IONS-SCNC: 13 MMOL/L (ref 5–15)
AST SERPL-CCNC: 18 U/L (ref 1–32)
BASOPHILS # BLD AUTO: 0.08 10*3/MM3 (ref 0–0.2)
BASOPHILS NFR BLD AUTO: 0.8 % (ref 0–1.5)
BILIRUB SERPL-MCNC: 0.6 MG/DL (ref 0.2–1.2)
BUN BLD-MCNC: 9 MG/DL (ref 6–20)
BUN/CREAT SERPL: 9.4 (ref 7–25)
CALCIUM SPEC-SCNC: 10 MG/DL (ref 8.6–10.5)
CHLORIDE SERPL-SCNC: 101 MMOL/L (ref 98–107)
CO2 SERPL-SCNC: 27 MMOL/L (ref 22–29)
CREAT BLD-MCNC: 0.96 MG/DL (ref 0.57–1)
CRP SERPL-MCNC: 0.16 MG/DL (ref 0–0.5)
DEPRECATED RDW RBC AUTO: 41.8 FL (ref 37–54)
EOSINOPHIL # BLD AUTO: 0.19 10*3/MM3 (ref 0–0.4)
EOSINOPHIL NFR BLD AUTO: 1.9 % (ref 0.3–6.2)
ERYTHROCYTE [DISTWIDTH] IN BLOOD BY AUTOMATED COUNT: 12.6 % (ref 12.3–15.4)
ERYTHROCYTE [SEDIMENTATION RATE] IN BLOOD: 16 MM/HR (ref 0–20)
GFR SERPL CREATININE-BSD FRML MDRD: 63 ML/MIN/1.73
GLOBULIN UR ELPH-MCNC: 3.1 GM/DL
GLUCOSE BLD-MCNC: 99 MG/DL (ref 65–99)
HCT VFR BLD AUTO: 42.3 % (ref 34–46.6)
HGB BLD-MCNC: 13.6 G/DL (ref 12–15.9)
IMM GRANULOCYTES # BLD AUTO: 0.03 10*3/MM3 (ref 0–0.05)
IMM GRANULOCYTES NFR BLD AUTO: 0.3 % (ref 0–0.5)
LYMPHOCYTES # BLD AUTO: 2.8 10*3/MM3 (ref 0.7–3.1)
LYMPHOCYTES NFR BLD AUTO: 27.5 % (ref 19.6–45.3)
MCH RBC QN AUTO: 28.9 PG (ref 26.6–33)
MCHC RBC AUTO-ENTMCNC: 32.2 G/DL (ref 31.5–35.7)
MCV RBC AUTO: 90 FL (ref 79–97)
MONOCYTES # BLD AUTO: 0.66 10*3/MM3 (ref 0.1–0.9)
MONOCYTES NFR BLD AUTO: 6.5 % (ref 5–12)
NEUTROPHILS # BLD AUTO: 6.43 10*3/MM3 (ref 1.7–7)
NEUTROPHILS NFR BLD AUTO: 63 % (ref 42.7–76)
NRBC BLD AUTO-RTO: 0 /100 WBC (ref 0–0.2)
PLATELET # BLD AUTO: 373 10*3/MM3 (ref 140–450)
PMV BLD AUTO: 10.8 FL (ref 6–12)
POTASSIUM BLD-SCNC: 4.1 MMOL/L (ref 3.5–5.2)
PROT SERPL-MCNC: 7.8 G/DL (ref 6–8.5)
RBC # BLD AUTO: 4.7 10*6/MM3 (ref 3.77–5.28)
SODIUM BLD-SCNC: 141 MMOL/L (ref 136–145)
WBC NRBC COR # BLD: 10.19 10*3/MM3 (ref 3.4–10.8)

## 2020-01-20 PROCEDURE — 85652 RBC SED RATE AUTOMATED: CPT

## 2020-01-20 PROCEDURE — 80053 COMPREHEN METABOLIC PANEL: CPT

## 2020-01-20 PROCEDURE — 86140 C-REACTIVE PROTEIN: CPT

## 2020-01-20 PROCEDURE — 36415 COLL VENOUS BLD VENIPUNCTURE: CPT

## 2020-01-20 PROCEDURE — 85025 COMPLETE CBC W/AUTO DIFF WBC: CPT

## 2020-01-30 ENCOUNTER — TRANSCRIBE ORDERS (OUTPATIENT)
Dept: LAB | Facility: HOSPITAL | Age: 45
End: 2020-01-30

## 2020-01-30 ENCOUNTER — LAB (OUTPATIENT)
Dept: LAB | Facility: HOSPITAL | Age: 45
End: 2020-01-30

## 2020-01-30 DIAGNOSIS — N39.0 URINARY TRACT INFECTION WITHOUT HEMATURIA, SITE UNSPECIFIED: ICD-10-CM

## 2020-01-30 DIAGNOSIS — M99.05 SOMATIC DYSFUNCTION OF PELVIS REGION: ICD-10-CM

## 2020-01-30 DIAGNOSIS — B96.89 BACTERIAL CHOLANGITIS: ICD-10-CM

## 2020-01-30 DIAGNOSIS — M99.05 SOMATIC DYSFUNCTION OF PELVIS REGION: Primary | ICD-10-CM

## 2020-01-30 DIAGNOSIS — R11.0 NAUSEA: ICD-10-CM

## 2020-01-30 DIAGNOSIS — K83.09 BACTERIAL CHOLANGITIS: ICD-10-CM

## 2020-01-30 LAB
BACTERIA UR QL AUTO: ABNORMAL /HPF
BILIRUB UR QL STRIP: NEGATIVE
CLARITY UR: CLEAR
COLOR UR: YELLOW
GLUCOSE UR STRIP-MCNC: NEGATIVE MG/DL
HGB UR QL STRIP.AUTO: ABNORMAL
HYALINE CASTS UR QL AUTO: ABNORMAL /LPF
KETONES UR QL STRIP: NEGATIVE
LEUKOCYTE ESTERASE UR QL STRIP.AUTO: NEGATIVE
NITRITE UR QL STRIP: NEGATIVE
PH UR STRIP.AUTO: 5.5 [PH] (ref 5–8)
PROT UR QL STRIP: ABNORMAL
RBC # UR: ABNORMAL /HPF
REF LAB TEST METHOD: ABNORMAL
SP GR UR STRIP: 1.03 (ref 1–1.03)
SQUAMOUS #/AREA URNS HPF: ABNORMAL /HPF
UROBILINOGEN UR QL STRIP: ABNORMAL
WBC UR QL AUTO: ABNORMAL /HPF

## 2020-01-30 PROCEDURE — 81001 URINALYSIS AUTO W/SCOPE: CPT

## 2020-01-30 PROCEDURE — 87086 URINE CULTURE/COLONY COUNT: CPT

## 2020-01-31 LAB — BACTERIA SPEC AEROBE CULT: NO GROWTH

## 2020-02-03 ENCOUNTER — LAB (OUTPATIENT)
Dept: LAB | Facility: HOSPITAL | Age: 45
End: 2020-02-03

## 2020-02-03 ENCOUNTER — TRANSCRIBE ORDERS (OUTPATIENT)
Dept: LAB | Facility: HOSPITAL | Age: 45
End: 2020-02-03

## 2020-02-03 DIAGNOSIS — M99.05 SOMATIC DYSFUNCTION OF PELVIS REGION: Primary | ICD-10-CM

## 2020-02-03 DIAGNOSIS — R11.0 NAUSEA: ICD-10-CM

## 2020-02-03 DIAGNOSIS — M99.05 SOMATIC DYSFUNCTION OF PELVIS REGION: ICD-10-CM

## 2020-02-03 DIAGNOSIS — N39.0 URINARY TRACT INFECTION WITHOUT HEMATURIA, SITE UNSPECIFIED: ICD-10-CM

## 2020-02-03 LAB
ALBUMIN SERPL-MCNC: 4.2 G/DL (ref 3.5–5.2)
ALBUMIN/GLOB SERPL: 1.2 G/DL
ALP SERPL-CCNC: 58 U/L (ref 39–117)
ALT SERPL W P-5'-P-CCNC: 15 U/L (ref 1–33)
ANION GAP SERPL CALCULATED.3IONS-SCNC: 11 MMOL/L (ref 5–15)
AST SERPL-CCNC: 14 U/L (ref 1–32)
BASOPHILS # BLD AUTO: 0.06 10*3/MM3 (ref 0–0.2)
BASOPHILS NFR BLD AUTO: 0.5 % (ref 0–1.5)
BILIRUB SERPL-MCNC: 0.4 MG/DL (ref 0.2–1.2)
BUN BLD-MCNC: 11 MG/DL (ref 6–20)
BUN/CREAT SERPL: 12.1 (ref 7–25)
CALCIUM SPEC-SCNC: 9.4 MG/DL (ref 8.6–10.5)
CHLORIDE SERPL-SCNC: 103 MMOL/L (ref 98–107)
CO2 SERPL-SCNC: 24 MMOL/L (ref 22–29)
CREAT BLD-MCNC: 0.91 MG/DL (ref 0.57–1)
DEPRECATED RDW RBC AUTO: 42.1 FL (ref 37–54)
EOSINOPHIL # BLD AUTO: 0.11 10*3/MM3 (ref 0–0.4)
EOSINOPHIL NFR BLD AUTO: 1 % (ref 0.3–6.2)
ERYTHROCYTE [DISTWIDTH] IN BLOOD BY AUTOMATED COUNT: 12.8 % (ref 12.3–15.4)
GFR SERPL CREATININE-BSD FRML MDRD: 67 ML/MIN/1.73
GLOBULIN UR ELPH-MCNC: 3.4 GM/DL
GLUCOSE BLD-MCNC: 89 MG/DL (ref 65–99)
HCT VFR BLD AUTO: 42.3 % (ref 34–46.6)
HGB BLD-MCNC: 13.5 G/DL (ref 12–15.9)
IMM GRANULOCYTES # BLD AUTO: 0.03 10*3/MM3 (ref 0–0.05)
IMM GRANULOCYTES NFR BLD AUTO: 0.3 % (ref 0–0.5)
LYMPHOCYTES # BLD AUTO: 2.9 10*3/MM3 (ref 0.7–3.1)
LYMPHOCYTES NFR BLD AUTO: 25.3 % (ref 19.6–45.3)
MCH RBC QN AUTO: 28.8 PG (ref 26.6–33)
MCHC RBC AUTO-ENTMCNC: 31.9 G/DL (ref 31.5–35.7)
MCV RBC AUTO: 90.4 FL (ref 79–97)
MONOCYTES # BLD AUTO: 0.83 10*3/MM3 (ref 0.1–0.9)
MONOCYTES NFR BLD AUTO: 7.2 % (ref 5–12)
NEUTROPHILS # BLD AUTO: 7.54 10*3/MM3 (ref 1.7–7)
NEUTROPHILS NFR BLD AUTO: 65.7 % (ref 42.7–76)
NRBC BLD AUTO-RTO: 0 /100 WBC (ref 0–0.2)
PLATELET # BLD AUTO: 381 10*3/MM3 (ref 140–450)
PMV BLD AUTO: 11.1 FL (ref 6–12)
POTASSIUM BLD-SCNC: 3.9 MMOL/L (ref 3.5–5.2)
PROT SERPL-MCNC: 7.6 G/DL (ref 6–8.5)
RBC # BLD AUTO: 4.68 10*6/MM3 (ref 3.77–5.28)
SODIUM BLD-SCNC: 138 MMOL/L (ref 136–145)
WBC NRBC COR # BLD: 11.47 10*3/MM3 (ref 3.4–10.8)

## 2020-02-03 PROCEDURE — 80053 COMPREHEN METABOLIC PANEL: CPT

## 2020-02-03 PROCEDURE — 85025 COMPLETE CBC W/AUTO DIFF WBC: CPT

## 2020-02-03 PROCEDURE — 36415 COLL VENOUS BLD VENIPUNCTURE: CPT

## 2020-02-08 ENCOUNTER — HOSPITAL ENCOUNTER (EMERGENCY)
Facility: HOSPITAL | Age: 45
Discharge: HOME | End: 2020-02-08
Attending: EMERGENCY MEDICINE | Admitting: EMERGENCY MEDICINE

## 2020-02-08 VITALS
RESPIRATION RATE: 16 BRPM | BODY MASS INDEX: 30.62 KG/M2 | OXYGEN SATURATION: 100 % | DIASTOLIC BLOOD PRESSURE: 88 MMHG | TEMPERATURE: 98.1 F | HEART RATE: 83 BPM | SYSTOLIC BLOOD PRESSURE: 136 MMHG | HEIGHT: 67 IN | WEIGHT: 195.11 LBS

## 2020-02-08 DIAGNOSIS — R03.0 ELEVATED BLOOD PRESSURE READING: ICD-10-CM

## 2020-02-08 DIAGNOSIS — N39.0 RECURRENT URINARY TRACT INFECTION: Primary | ICD-10-CM

## 2020-02-08 LAB
ALBUMIN SERPL-MCNC: 4.7 G/DL (ref 3.5–5.2)
ALBUMIN/GLOB SERPL: 1.4 G/DL
ALP SERPL-CCNC: 61 U/L (ref 39–117)
ALT SERPL W P-5'-P-CCNC: 15 U/L (ref 1–33)
ANION GAP SERPL CALCULATED.3IONS-SCNC: 11 MMOL/L (ref 5–15)
AST SERPL-CCNC: 14 U/L (ref 1–32)
B-HCG UR QL: NEGATIVE
BACTERIA UR QL AUTO: ABNORMAL /HPF
BASOPHILS # BLD AUTO: 0.07 10*3/MM3 (ref 0–0.2)
BASOPHILS NFR BLD AUTO: 0.9 % (ref 0–1.5)
BILIRUB SERPL-MCNC: 0.6 MG/DL (ref 0.2–1.2)
BILIRUB UR QL STRIP: NEGATIVE
BUN BLD-MCNC: 13 MG/DL (ref 6–20)
BUN/CREAT SERPL: 13.4 (ref 7–25)
CALCIUM SPEC-SCNC: 9.5 MG/DL (ref 8.6–10.5)
CHLORIDE SERPL-SCNC: 105 MMOL/L (ref 98–107)
CLARITY UR: ABNORMAL
CO2 SERPL-SCNC: 23 MMOL/L (ref 22–29)
COLOR UR: YELLOW
CREAT BLD-MCNC: 0.97 MG/DL (ref 0.57–1)
DEPRECATED RDW RBC AUTO: 42 FL (ref 37–54)
EOSINOPHIL # BLD AUTO: 0.14 10*3/MM3 (ref 0–0.4)
EOSINOPHIL NFR BLD AUTO: 1.8 % (ref 0.3–6.2)
ERYTHROCYTE [DISTWIDTH] IN BLOOD BY AUTOMATED COUNT: 12.8 % (ref 12.3–15.4)
GFR SERPL CREATININE-BSD FRML MDRD: 63 ML/MIN/1.73
GLOBULIN UR ELPH-MCNC: 3.3 GM/DL
GLUCOSE BLD-MCNC: 101 MG/DL (ref 65–99)
GLUCOSE UR STRIP-MCNC: NEGATIVE MG/DL
HCT VFR BLD AUTO: 43.2 % (ref 34–46.6)
HGB BLD-MCNC: 14.4 G/DL (ref 12–15.9)
HGB UR QL STRIP.AUTO: NEGATIVE
HOLD SPECIMEN: NORMAL
HOLD SPECIMEN: NORMAL
HYALINE CASTS UR QL AUTO: ABNORMAL /LPF
IMM GRANULOCYTES # BLD AUTO: 0.02 10*3/MM3 (ref 0–0.05)
IMM GRANULOCYTES NFR BLD AUTO: 0.3 % (ref 0–0.5)
INTERNAL NEGATIVE CONTROL: NEGATIVE
INTERNAL POSITIVE CONTROL: POSITIVE
KETONES UR QL STRIP: NEGATIVE
LEUKOCYTE ESTERASE UR QL STRIP.AUTO: ABNORMAL
LIPASE SERPL-CCNC: 25 U/L (ref 13–60)
LYMPHOCYTES # BLD AUTO: 2.36 10*3/MM3 (ref 0.7–3.1)
LYMPHOCYTES NFR BLD AUTO: 30.3 % (ref 19.6–45.3)
Lab: NORMAL
MCH RBC QN AUTO: 30.3 PG (ref 26.6–33)
MCHC RBC AUTO-ENTMCNC: 33.3 G/DL (ref 31.5–35.7)
MCV RBC AUTO: 90.9 FL (ref 79–97)
MONOCYTES # BLD AUTO: 0.54 10*3/MM3 (ref 0.1–0.9)
MONOCYTES NFR BLD AUTO: 6.9 % (ref 5–12)
NEUTROPHILS # BLD AUTO: 4.66 10*3/MM3 (ref 1.7–7)
NEUTROPHILS NFR BLD AUTO: 59.8 % (ref 42.7–76)
NITRITE UR QL STRIP: NEGATIVE
NRBC BLD AUTO-RTO: 0 /100 WBC (ref 0–0.2)
PH UR STRIP.AUTO: 6 [PH] (ref 5–8)
PLATELET # BLD AUTO: 365 10*3/MM3 (ref 140–450)
PMV BLD AUTO: 11 FL (ref 6–12)
POTASSIUM BLD-SCNC: 4.2 MMOL/L (ref 3.5–5.2)
PROT SERPL-MCNC: 8 G/DL (ref 6–8.5)
PROT UR QL STRIP: NEGATIVE
RBC # BLD AUTO: 4.75 10*6/MM3 (ref 3.77–5.28)
RBC # UR: ABNORMAL /HPF
REF LAB TEST METHOD: ABNORMAL
SODIUM BLD-SCNC: 139 MMOL/L (ref 136–145)
SP GR UR STRIP: 1.02 (ref 1–1.03)
SQUAMOUS #/AREA URNS HPF: ABNORMAL /HPF
TSH SERPL DL<=0.05 MIU/L-ACNC: 1.97 UIU/ML (ref 0.27–4.2)
UROBILINOGEN UR QL STRIP: ABNORMAL
WBC NRBC COR # BLD: 7.79 10*3/MM3 (ref 3.4–10.8)
WBC UR QL AUTO: ABNORMAL /HPF
WHOLE BLOOD HOLD SPECIMEN: NORMAL
WHOLE BLOOD HOLD SPECIMEN: NORMAL

## 2020-02-08 PROCEDURE — 84443 ASSAY THYROID STIM HORMONE: CPT | Performed by: EMERGENCY MEDICINE

## 2020-02-08 PROCEDURE — 25010000002 ONDANSETRON PER 1 MG: Performed by: EMERGENCY MEDICINE

## 2020-02-08 PROCEDURE — 25010000002 KETOROLAC TROMETHAMINE PER 15 MG: Performed by: EMERGENCY MEDICINE

## 2020-02-08 PROCEDURE — 96375 TX/PRO/DX INJ NEW DRUG ADDON: CPT

## 2020-02-08 PROCEDURE — 83690 ASSAY OF LIPASE: CPT | Performed by: EMERGENCY MEDICINE

## 2020-02-08 PROCEDURE — 96365 THER/PROPH/DIAG IV INF INIT: CPT

## 2020-02-08 PROCEDURE — 80053 COMPREHEN METABOLIC PANEL: CPT | Performed by: EMERGENCY MEDICINE

## 2020-02-08 PROCEDURE — 99283 EMERGENCY DEPT VISIT LOW MDM: CPT

## 2020-02-08 PROCEDURE — 81025 URINE PREGNANCY TEST: CPT | Performed by: EMERGENCY MEDICINE

## 2020-02-08 PROCEDURE — 85025 COMPLETE CBC W/AUTO DIFF WBC: CPT | Performed by: EMERGENCY MEDICINE

## 2020-02-08 PROCEDURE — 25010000002 CEFTRIAXONE PER 250 MG: Performed by: EMERGENCY MEDICINE

## 2020-02-08 PROCEDURE — 81001 URINALYSIS AUTO W/SCOPE: CPT | Performed by: EMERGENCY MEDICINE

## 2020-02-08 RX ORDER — KETOROLAC TROMETHAMINE 10 MG/1
10 TABLET, FILM COATED ORAL EVERY 6 HOURS PRN
Qty: 20 TABLET | Refills: 0 | Status: SHIPPED | OUTPATIENT
Start: 2020-02-08 | End: 2020-05-21

## 2020-02-08 RX ORDER — PHENAZOPYRIDINE HYDROCHLORIDE 100 MG/1
200 TABLET, FILM COATED ORAL ONCE
Status: DISCONTINUED | OUTPATIENT
Start: 2020-02-08 | End: 2020-02-08 | Stop reason: HOSPADM

## 2020-02-08 RX ORDER — SODIUM CHLORIDE 0.9 % (FLUSH) 0.9 %
10 SYRINGE (ML) INJECTION AS NEEDED
Status: DISCONTINUED | OUTPATIENT
Start: 2020-02-08 | End: 2020-02-08 | Stop reason: HOSPADM

## 2020-02-08 RX ORDER — KETOROLAC TROMETHAMINE 15 MG/ML
15 INJECTION, SOLUTION INTRAMUSCULAR; INTRAVENOUS ONCE
Status: COMPLETED | OUTPATIENT
Start: 2020-02-08 | End: 2020-02-08

## 2020-02-08 RX ORDER — DOXYCYCLINE HYCLATE 100 MG
100 TABLET ORAL 2 TIMES DAILY
COMMUNITY
End: 2020-02-08

## 2020-02-08 RX ORDER — CEFDINIR 300 MG/1
300 CAPSULE ORAL 2 TIMES DAILY
Qty: 14 CAPSULE | Refills: 0 | OUTPATIENT
Start: 2020-02-08 | End: 2020-05-01

## 2020-02-08 RX ORDER — ONDANSETRON 2 MG/ML
4 INJECTION INTRAMUSCULAR; INTRAVENOUS ONCE
Status: COMPLETED | OUTPATIENT
Start: 2020-02-08 | End: 2020-02-08

## 2020-02-08 RX ADMIN — CEFTRIAXONE 1 G: 1 INJECTION, POWDER, FOR SOLUTION INTRAMUSCULAR; INTRAVENOUS at 10:48

## 2020-02-08 RX ADMIN — KETOROLAC TROMETHAMINE 15 MG: 15 INJECTION, SOLUTION INTRAMUSCULAR; INTRAVENOUS at 09:13

## 2020-02-08 RX ADMIN — ONDANSETRON 4 MG: 2 INJECTION INTRAMUSCULAR; INTRAVENOUS at 09:11

## 2020-02-08 NOTE — ED PROVIDER NOTES
"Subjective   Halle Rea is a 44 y.o. female who presents to the emergency department with complaints of left flank pain that started 3 months ago and increased this morning. The patient reports that this morning she developed dysuria, and says it feels like she is \"peeing glass\". She has nausea, vomiting, and a fever of 100. The patient says that her symptoms started in 11/2019, when she was diagnosed with a UTI and prescribed Levaquin. She says that the Levaquin did not work, so they prescribed her Augmentin. The Augmentin did not work, so they tried a Rocephin shot that did not help. The patient was referred to infectious disease who prescribed 10 days of IV Rocephin, and one week of oral Rocephin. The patient was evaluated by Urology at  who prescribed her Baclofen and Methenamine, that did not work. The patient reports that she was evaluated by Urology at  for the second time when she was told she might have a muscle around the urethra that was too tight. The patient called Dr. Meng last week and her urinalysis had abnormalities so she was prescribed Doxycycline. The patient thinks that her symptoms have worsened today. She had 2 CT scans, one in 12/2019 and one in 01/2020 that was negative for a kidney stone. The patient has a history of hypertension. She has a surgical history that includes a left salpingectomy, and wisdom teeth removal. She denies any other acute symptoms at this time.       History provided by:  Patient  Flank Pain   Pain location:  L flank  Pain radiates to:  Does not radiate  Pain severity:  Moderate  Onset quality:  Sudden  Duration:  12 weeks  Timing:  Constant  Progression:  Unchanged  Chronicity:  Recurrent  Relieved by:  Nothing  Worsened by:  Nothing  Ineffective treatments: Levaquin, Rocephin, Baclofen, Methenamine, Doxycycline, Augmentin.  Associated symptoms: dysuria, fever, nausea and vomiting    Associated symptoms: no diarrhea and no hematuria        Review of " Systems   Constitutional: Positive for fever.   Gastrointestinal: Positive for nausea and vomiting. Negative for abdominal pain and diarrhea.   Genitourinary: Positive for dysuria and flank pain. Negative for hematuria.   All other systems reviewed and are negative.      Past Medical History:   Diagnosis Date   • Asthma    • Seasonal allergies    • Sepsis (CMS/HCC)    • Urinary tract infection        Allergies   Allergen Reactions   • Sulfa Antibiotics Other (See Comments)     Loses voice reaction as child       Past Surgical History:   Procedure Laterality Date   • DIAGNOSTIC LAPAROSCOPY EXPLORATORY LAPAROTOMY N/A 7/20/2016    Procedure: DIAGNOSTIC LAPAROSCOPY EXPLORATORY LAPAROTOMY;  Surgeon: Katherine Hernandez MD;  Location:  [facility] OR;  Service:    • MOUTH SURGERY     • OVARIAN CYST DRAINAGE/EXCISION N/A 7/20/2016    Procedure: DIAGNOSTIC LAPAROSCOPY, OVARIAN CYSTECTOMY POSSIBLE OOPHORECTOMY, POSSIBLE LASER;  Surgeon: Katherine Hernandze MD;  Location:  [facility] OR;  Service:        History reviewed. No pertinent family history.    Social History     Socioeconomic History   • Marital status:      Spouse name: Not on file   • Number of children: Not on file   • Years of education: Not on file   • Highest education level: Not on file   Tobacco Use   • Smoking status: Never Smoker   Substance and Sexual Activity   • Alcohol use: Yes     Alcohol/week: 2.0 standard drinks     Types: 1 Glasses of wine, 1 Cans of beer per week     Comment: usually one per month   • Drug use: No   • Sexual activity: Yes     Partners: Male         Objective   Physical Exam   Constitutional: She is oriented to person, place, and time. She appears well-developed and well-nourished. No distress.   HENT:   Head: Normocephalic and atraumatic.   Eyes: Conjunctivae are normal. No scleral icterus.   Neck: Normal range of motion. Neck supple.   Cardiovascular: Normal rate, regular rhythm, normal heart sounds and intact distal pulses.    Pulmonary/Chest: Effort normal and breath sounds normal. No respiratory distress. She has no wheezes. She has no rales.   Abdominal: Soft. There is no tenderness. There is CVA tenderness (left). There is no guarding.   Musculoskeletal: Normal range of motion.   Neurological: She is alert and oriented to person, place, and time.   Skin: Skin is warm and dry. She is not diaphoretic.   Psychiatric: She has a normal mood and affect. Her behavior is normal.   Nursing note and vitals reviewed.      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  CT scan at that time demonstrated no evidence of nephro or ureterolithiasis.    [RS]   0940 Patient reports she had one other bacteria isolated from prior culture from her physician at the [facility] demonstrating Enterococcus faecalis.    [RS]   1024 Leukocytes, UA(!): Moderate (2+) [RS]   1024 WBC, UA(!): Too Numerous to Count [RS]   1054 Post void residual performed at the bedside by the emergency physician with ultrasound guidance demonstrating a post void residual of less than 2 cc.  No evidence of urinary retention.    [RS]   1112 Urology paged.    [RS]   1116 Case discussed with Dr. Angelo who advised that he would be willing to see the patient this next week for follow-up.    [RS]   1118 I updated the patient on the findings, plan, and follow-up with urology.I had a discussion with the patient/family regarding diagnosis, diagnostic results, treatment plan, and medications.  The patient/family indicated understanding of these instructions.  I spent adequate time at the bedside proceeding discharge necessary to personally discuss the aftercare instructions, giving patient education, providing explanations of the results of our evaluations/findings, and my decision making to assure that the patient/family understand the plan of  care.  Time was allotted to answer questions at that time and throughout the ED course.  Emphasis was placed on timely follow-up after discharge.  I also discussed the potential for the development of an acute emergent condition requiring further evaluation, admission, or even surgical intervention. I discussed that we found nothing during the visit today indicating the need for further workup, admission, or the presence of an unstable medical condition.  I encouraged the patient to return to the emergency department immediately for ANY concerns, worsening, new complaints, or if symptoms persist and unable to seek follow-up in a timely fashion.  The patient/family expressed understanding and agreement with this plan.     [RS]      ED Course User Index  [RS] Rakan Nielson MD          Recent Results (from the past 24 hour(s))   Comprehensive Metabolic Panel    Collection Time: 02/08/20  8:55 AM   Result Value Ref Range    Glucose 101 (H) 65 - 99 mg/dL    BUN 13 6 - 20 mg/dL    Creatinine 0.97 0.57 - 1.00 mg/dL    Sodium 139 136 - 145 mmol/L    Potassium 4.2 3.5 - 5.2 mmol/L    Chloride 105 98 - 107 mmol/L    CO2 23.0 22.0 - 29.0 mmol/L    Calcium 9.5 8.6 - 10.5 mg/dL    Total Protein 8.0 6.0 - 8.5 g/dL    Albumin 4.70 3.50 - 5.20 g/dL    ALT (SGPT) 15 1 - 33 U/L    AST (SGOT) 14 1 - 32 U/L    Alkaline Phosphatase 61 39 - 117 U/L    Total Bilirubin 0.6 0.2 - 1.2 mg/dL    eGFR Non African Amer 63 >60 mL/min/1.73    Globulin 3.3 gm/dL    A/G Ratio 1.4 g/dL    BUN/Creatinine Ratio 13.4 7.0 - 25.0    Anion Gap 11.0 5.0 - 15.0 mmol/L   Lipase    Collection Time: 02/08/20  8:55 AM   Result Value Ref Range    Lipase 25 13 - 60 U/L   Light Blue Top    Collection Time: 02/08/20  8:55 AM   Result Value Ref Range    Extra Tube hold for add-on    Green Top (Gel)    Collection Time: 02/08/20  8:55 AM   Result Value Ref Range    Extra Tube Hold for add-ons.    Lavender Top    Collection Time: 02/08/20  8:55 AM   Result Value  Ref Range    Extra Tube hold for add-on    Gold Top - SST    Collection Time: 02/08/20  8:55 AM   Result Value Ref Range    Extra Tube Hold for add-ons.    CBC Auto Differential    Collection Time: 02/08/20  8:55 AM   Result Value Ref Range    WBC 7.79 3.40 - 10.80 10*3/mm3    RBC 4.75 3.77 - 5.28 10*6/mm3    Hemoglobin 14.4 12.0 - 15.9 g/dL    Hematocrit 43.2 34.0 - 46.6 %    MCV 90.9 79.0 - 97.0 fL    MCH 30.3 26.6 - 33.0 pg    MCHC 33.3 31.5 - 35.7 g/dL    RDW 12.8 12.3 - 15.4 %    RDW-SD 42.0 37.0 - 54.0 fl    MPV 11.0 6.0 - 12.0 fL    Platelets 365 140 - 450 10*3/mm3    Neutrophil % 59.8 42.7 - 76.0 %    Lymphocyte % 30.3 19.6 - 45.3 %    Monocyte % 6.9 5.0 - 12.0 %    Eosinophil % 1.8 0.3 - 6.2 %    Basophil % 0.9 0.0 - 1.5 %    Immature Grans % 0.3 0.0 - 0.5 %    Neutrophils, Absolute 4.66 1.70 - 7.00 10*3/mm3    Lymphocytes, Absolute 2.36 0.70 - 3.10 10*3/mm3    Monocytes, Absolute 0.54 0.10 - 0.90 10*3/mm3    Eosinophils, Absolute 0.14 0.00 - 0.40 10*3/mm3    Basophils, Absolute 0.07 0.00 - 0.20 10*3/mm3    Immature Grans, Absolute 0.02 0.00 - 0.05 10*3/mm3    nRBC 0.0 0.0 - 0.2 /100 WBC   TSH    Collection Time: 02/08/20  8:55 AM   Result Value Ref Range    TSH 1.970 0.270 - 4.200 uIU/mL   POCT pregnancy, urine    Collection Time: 02/08/20  9:20 AM   Result Value Ref Range    HCG, Urine, QL Negative Negative    Lot Number [id number]     Internal Positive Control Positive     Internal Negative Control Negative    Urinalysis With Microscopic If Indicated (No Culture) - Urine, Clean Catch    Collection Time: 02/08/20  9:24 AM   Result Value Ref Range    Color, UA Yellow Yellow, Straw    Appearance, UA Cloudy (A) Clear    pH, UA 6.0 5.0 - 8.0    Specific Gravity, UA 1.022 1.001 - 1.030    Glucose, UA Negative Negative    Ketones, UA Negative Negative    Bilirubin, UA Negative Negative    Blood, UA Negative Negative    Protein, UA Negative Negative    Leuk Esterase, UA Moderate (2+) (A) Negative    Nitrite, UA  Negative Negative    Urobilinogen, UA 0.2 E.U./dL 0.2 - 1.0 E.U./dL   Urinalysis, Microscopic Only - Urine, Clean Catch    Collection Time: 02/08/20  9:24 AM   Result Value Ref Range    RBC, UA 0-2 None Seen, 0-2 /HPF    WBC, UA Too Numerous to Count (A) None Seen, 0-2 /HPF    Bacteria, UA None Seen None Seen, Trace /HPF    Squamous Epithelial Cells, UA 3-6 (A) None Seen, 0-2 /HPF    Hyaline Casts, UA 0-6 0 - 6 /LPF    Methodology Automated Microscopy      Note: In addition to lab results from this visit, the labs listed above may include labs taken at another facility or during a different encounter within the last 24 hours. Please correlate lab times with ED admission and discharge times for further clarification of the services performed during this visit.    No orders to display     Vitals:    02/08/20 1103 02/08/20 1200 02/08/20 1201 02/08/20 1202   BP:  136/88     Patient Position:       Pulse:       Resp:       Temp:       TempSrc:       SpO2: 100% 100% 93% 100%   Weight:       Height:         Medications   sodium chloride 0.9 % flush 10 mL (has no administration in time range)   phenazopyridine (PYRIDIUM) tablet 200 mg (200 mg Oral Not Given 2/8/20 0915)   ketorolac (TORADOL) injection 15 mg (15 mg Intravenous Given 2/8/20 0913)   ondansetron (ZOFRAN) injection 4 mg (4 mg Intravenous Given 2/8/20 0911)   cefTRIAXone (ROCEPHIN) 1 g/100 mL 0.9% NS (MBP) (0 g Intravenous Stopped 2/8/20 1118)     ECG/EMG Results (last 24 hours)     ** No results found for the last 24 hours. **        No orders to display                                             MDM  Number of Diagnoses or Management Options  Elevated blood pressure reading:   Recurrent urinary tract infection:      Amount and/or Complexity of Data Reviewed  Clinical lab tests: reviewed  Review and summarize past medical records: yes  Discuss the patient with other providers: yes        Final diagnoses:   Recurrent urinary tract infection   Elevated blood  pressure reading       Documentation assistance provided by meka Keenan.  Information recorded by the scribe was done at my direction and has been verified and validated by me.     Astrid Keenan  02/08/20 0929       Rakan Nielson MD  02/08/20 1621

## 2020-03-16 ENCOUNTER — TRANSCRIBE ORDERS (OUTPATIENT)
Dept: LAB | Facility: HOSPITAL | Age: 45
End: 2020-03-16

## 2020-03-16 ENCOUNTER — LAB (OUTPATIENT)
Dept: LAB | Facility: HOSPITAL | Age: 45
End: 2020-03-16

## 2020-03-16 DIAGNOSIS — R10.9 STOMACH ACHE: ICD-10-CM

## 2020-03-16 DIAGNOSIS — R30.0 DYSURIA: Primary | ICD-10-CM

## 2020-03-16 DIAGNOSIS — R30.0 DYSURIA: ICD-10-CM

## 2020-03-16 LAB
BACTERIA UR QL AUTO: NORMAL /HPF
BILIRUB UR QL STRIP: NEGATIVE
CLARITY UR: CLEAR
COLOR UR: YELLOW
GLUCOSE UR STRIP-MCNC: NEGATIVE MG/DL
HGB UR QL STRIP.AUTO: NEGATIVE
HYALINE CASTS UR QL AUTO: NORMAL /LPF
KETONES UR QL STRIP: NEGATIVE
LEUKOCYTE ESTERASE UR QL STRIP.AUTO: ABNORMAL
NITRITE UR QL STRIP: NEGATIVE
PH UR STRIP.AUTO: 7 [PH] (ref 5–8)
PROT UR QL STRIP: NEGATIVE
RBC # UR: NORMAL /HPF
REF LAB TEST METHOD: NORMAL
SP GR UR STRIP: 1.01 (ref 1–1.03)
SQUAMOUS #/AREA URNS HPF: NORMAL /HPF
UROBILINOGEN UR QL STRIP: ABNORMAL
WBC UR QL AUTO: NORMAL /HPF

## 2020-03-16 PROCEDURE — 87086 URINE CULTURE/COLONY COUNT: CPT

## 2020-03-16 PROCEDURE — 81001 URINALYSIS AUTO W/SCOPE: CPT

## 2020-03-17 LAB — BACTERIA SPEC AEROBE CULT: NO GROWTH

## 2020-05-01 ENCOUNTER — HOSPITAL ENCOUNTER (EMERGENCY)
Facility: HOSPITAL | Age: 45
Discharge: HOME | End: 2020-05-01
Attending: EMERGENCY MEDICINE | Admitting: EMERGENCY MEDICINE

## 2020-05-01 VITALS
TEMPERATURE: 97.7 F | HEIGHT: 67 IN | WEIGHT: 189 LBS | BODY MASS INDEX: 29.66 KG/M2 | HEART RATE: 87 BPM | RESPIRATION RATE: 16 BRPM | SYSTOLIC BLOOD PRESSURE: 147 MMHG | OXYGEN SATURATION: 100 % | DIASTOLIC BLOOD PRESSURE: 83 MMHG

## 2020-05-01 DIAGNOSIS — N39.0 RECURRENT UTI (URINARY TRACT INFECTION): Primary | ICD-10-CM

## 2020-05-01 LAB
ALBUMIN SERPL-MCNC: 4.4 G/DL (ref 3.5–5.2)
ALBUMIN/GLOB SERPL: 1.3 G/DL
ALP SERPL-CCNC: 72 U/L (ref 39–117)
ALT SERPL W P-5'-P-CCNC: 16 U/L (ref 1–33)
ANION GAP SERPL CALCULATED.3IONS-SCNC: 14 MMOL/L (ref 5–15)
AST SERPL-CCNC: 16 U/L (ref 1–32)
BACTERIA UR QL AUTO: ABNORMAL /HPF
BASOPHILS # BLD AUTO: 0.06 10*3/MM3 (ref 0–0.2)
BASOPHILS NFR BLD AUTO: 0.4 % (ref 0–1.5)
BILIRUB SERPL-MCNC: 0.5 MG/DL (ref 0.2–1.2)
BILIRUB UR QL STRIP: ABNORMAL
BUN BLD-MCNC: 11 MG/DL (ref 6–20)
BUN/CREAT SERPL: 10.2 (ref 7–25)
CALCIUM SPEC-SCNC: 9.8 MG/DL (ref 8.6–10.5)
CHLORIDE SERPL-SCNC: 101 MMOL/L (ref 98–107)
CLARITY UR: ABNORMAL
CO2 SERPL-SCNC: 25 MMOL/L (ref 22–29)
COLOR UR: ABNORMAL
CREAT BLD-MCNC: 1.08 MG/DL (ref 0.57–1)
DEPRECATED RDW RBC AUTO: 41.7 FL (ref 37–54)
EOSINOPHIL # BLD AUTO: 0.05 10*3/MM3 (ref 0–0.4)
EOSINOPHIL NFR BLD AUTO: 0.3 % (ref 0.3–6.2)
ERYTHROCYTE [DISTWIDTH] IN BLOOD BY AUTOMATED COUNT: 12.6 % (ref 12.3–15.4)
GFR SERPL CREATININE-BSD FRML MDRD: 55 ML/MIN/1.73
GLOBULIN UR ELPH-MCNC: 3.4 GM/DL
GLUCOSE BLD-MCNC: 115 MG/DL (ref 65–99)
GLUCOSE UR STRIP-MCNC: NEGATIVE MG/DL
HCT VFR BLD AUTO: 43.8 % (ref 34–46.6)
HGB BLD-MCNC: 14.1 G/DL (ref 12–15.9)
HGB UR QL STRIP.AUTO: ABNORMAL
HOLD SPECIMEN: NORMAL
HYALINE CASTS UR QL AUTO: ABNORMAL /LPF
IMM GRANULOCYTES # BLD AUTO: 0.07 10*3/MM3 (ref 0–0.05)
IMM GRANULOCYTES NFR BLD AUTO: 0.5 % (ref 0–0.5)
KETONES UR QL STRIP: ABNORMAL
LEUKOCYTE ESTERASE UR QL STRIP.AUTO: ABNORMAL
LIPASE SERPL-CCNC: 26 U/L (ref 13–60)
LYMPHOCYTES # BLD AUTO: 2.6 10*3/MM3 (ref 0.7–3.1)
LYMPHOCYTES NFR BLD AUTO: 16.8 % (ref 19.6–45.3)
MCH RBC QN AUTO: 29.1 PG (ref 26.6–33)
MCHC RBC AUTO-ENTMCNC: 32.2 G/DL (ref 31.5–35.7)
MCV RBC AUTO: 90.3 FL (ref 79–97)
MONOCYTES # BLD AUTO: 0.9 10*3/MM3 (ref 0.1–0.9)
MONOCYTES NFR BLD AUTO: 5.8 % (ref 5–12)
NEUTROPHILS # BLD AUTO: 11.79 10*3/MM3 (ref 1.7–7)
NEUTROPHILS NFR BLD AUTO: 76.2 % (ref 42.7–76)
NITRITE UR QL STRIP: NEGATIVE
NRBC BLD AUTO-RTO: 0 /100 WBC (ref 0–0.2)
PH UR STRIP.AUTO: 5.5 [PH] (ref 5–8)
PLATELET # BLD AUTO: 372 10*3/MM3 (ref 140–450)
PMV BLD AUTO: 10.9 FL (ref 6–12)
POTASSIUM BLD-SCNC: 3.6 MMOL/L (ref 3.5–5.2)
PROT SERPL-MCNC: 7.8 G/DL (ref 6–8.5)
PROT UR QL STRIP: ABNORMAL
RBC # BLD AUTO: 4.85 10*6/MM3 (ref 3.77–5.28)
RBC # UR: ABNORMAL /HPF
REF LAB TEST METHOD: ABNORMAL
SODIUM BLD-SCNC: 140 MMOL/L (ref 136–145)
SP GR UR STRIP: 1.03 (ref 1–1.03)
SQUAMOUS #/AREA URNS HPF: ABNORMAL /HPF
UROBILINOGEN UR QL STRIP: ABNORMAL
WBC NRBC COR # BLD: 15.47 10*3/MM3 (ref 3.4–10.8)
WBC UR QL AUTO: ABNORMAL /HPF
WHOLE BLOOD HOLD SPECIMEN: NORMAL

## 2020-05-01 PROCEDURE — 81001 URINALYSIS AUTO W/SCOPE: CPT | Performed by: EMERGENCY MEDICINE

## 2020-05-01 PROCEDURE — 87086 URINE CULTURE/COLONY COUNT: CPT | Performed by: PHYSICIAN ASSISTANT

## 2020-05-01 PROCEDURE — 25010000002 CEFTRIAXONE PER 250 MG: Performed by: PHYSICIAN ASSISTANT

## 2020-05-01 PROCEDURE — 99283 EMERGENCY DEPT VISIT LOW MDM: CPT

## 2020-05-01 PROCEDURE — 80053 COMPREHEN METABOLIC PANEL: CPT | Performed by: EMERGENCY MEDICINE

## 2020-05-01 PROCEDURE — 83690 ASSAY OF LIPASE: CPT | Performed by: EMERGENCY MEDICINE

## 2020-05-01 PROCEDURE — 96374 THER/PROPH/DIAG INJ IV PUSH: CPT

## 2020-05-01 PROCEDURE — 85025 COMPLETE CBC W/AUTO DIFF WBC: CPT | Performed by: EMERGENCY MEDICINE

## 2020-05-01 RX ORDER — CEFDINIR 300 MG/1
300 CAPSULE ORAL 2 TIMES DAILY
Qty: 14 CAPSULE | Refills: 0 | Status: SHIPPED | OUTPATIENT
Start: 2020-05-01 | End: 2020-05-08

## 2020-05-01 RX ORDER — SODIUM CHLORIDE 0.9 % (FLUSH) 0.9 %
10 SYRINGE (ML) INJECTION AS NEEDED
Status: DISCONTINUED | OUTPATIENT
Start: 2020-05-01 | End: 2020-05-01 | Stop reason: HOSPADM

## 2020-05-01 RX ORDER — METHENAMINE HIPPURATE 1000 MG/1
1 TABLET ORAL 2 TIMES DAILY WITH MEALS
COMMUNITY
End: 2020-06-22

## 2020-05-01 RX ADMIN — SODIUM CHLORIDE 1 G: 900 INJECTION INTRAVENOUS at 20:21

## 2020-05-01 RX ADMIN — SODIUM CHLORIDE 1000 ML: 9 INJECTION, SOLUTION INTRAVENOUS at 20:20

## 2020-05-02 NOTE — DISCHARGE INSTRUCTIONS
Symptomatic care is recommended.  Take all medications as prescribed and instructed.  Finish full course of antibiotics as prescribed.  Follow-up with your primary care and urology as recommended or return to ED with worsening of symptoms.

## 2020-05-02 NOTE — ED PROVIDER NOTES
Subjective   Patient is a 44-year-old female with past medical history significant for recurrent urinary tract infections who presents to the emergency room today with complaints of dysuria and flank pain.  Patient reports that she has been seen 3 times over the past 4 months for similar type symptoms.  She states that she has gone the longest stretch during this time without antibiotics and that she has been 22 days since finishing her last antibiotic prescription.  She denies anything specific that makes her symptoms better or worse.  She reports associated symptoms of nausea, vomiting and pain in her left flank.  Patient has been seen in follow-up with urology and has established care with Dr. Angelo.  She states that she would have gone to his office today but that they were closed.          Review of Systems   Constitutional: Negative for chills and fever.   Respiratory: Negative for shortness of breath.    Cardiovascular: Negative for chest pain.   Gastrointestinal:        Left flank pain   Genitourinary: Positive for dysuria and flank pain. Negative for vaginal bleeding and vaginal discharge.   All other systems reviewed and are negative.      Past Medical History:   Diagnosis Date   • Asthma    • Seasonal allergies    • Sepsis (CMS/HCC)    • Urinary tract infection        Allergies   Allergen Reactions   • Sulfa Antibiotics Other (See Comments)     Loses voice reaction as child       Past Surgical History:   Procedure Laterality Date   • DIAGNOSTIC LAPAROSCOPY EXPLORATORY LAPAROTOMY N/A 7/20/2016    Procedure: DIAGNOSTIC LAPAROSCOPY EXPLORATORY LAPAROTOMY;  Surgeon: Katherine Hernandez MD;  Location: [facility] OR;  Service:    • MOUTH SURGERY     • OVARIAN CYST DRAINAGE/EXCISION N/A 7/20/2016    Procedure: DIAGNOSTIC LAPAROSCOPY, OVARIAN CYSTECTOMY POSSIBLE OOPHORECTOMY, POSSIBLE LASER;  Surgeon: Katherine Hernandez MD;  Location: [facility] OR;  Service:        No family history on file.    Social History      Socioeconomic History   • Marital status:      Spouse name: Not on file   • Number of children: Not on file   • Years of education: Not on file   • Highest education level: Not on file   Tobacco Use   • Smoking status: Never Smoker   Substance and Sexual Activity   • Alcohol use: Yes     Alcohol/week: 2.0 standard drinks     Types: 1 Glasses of wine, 1 Cans of beer per week     Comment: usually one per month   • Drug use: No   • Sexual activity: Yes     Partners: Male           Objective   Physical Exam   Constitutional: She is oriented to person, place, and time. She appears well-developed and well-nourished. No distress.   HENT:   Head: Normocephalic and atraumatic.   Eyes: Conjunctivae and EOM are normal.   Neck: Normal range of motion. Neck supple.   Cardiovascular: Normal rate and regular rhythm.   Pulmonary/Chest: Effort normal and breath sounds normal. No respiratory distress.   Abdominal: Soft. Normal appearance. She exhibits no distension.   Musculoskeletal: Normal range of motion.   Neurological: She is alert and oriented to person, place, and time.   Skin: Skin is warm and dry.   Psychiatric: She has a normal mood and affect. Her behavior is normal. Judgment and thought content normal.   Nursing note and vitals reviewed.      Procedures           ED Course  ED Course as of May 01 2127   Fri May 01, 2020   2123 Patient with history of recurrent urinary tract infections presents the emergency room today with complaints of dysuria and flank pain.  Patient is followed by Dr. Angelo who has recently treated her UTIs with Macrobid unsuccessfully and most recently with Levaquin.  Unclear etiology of recurrent UTIs.  Patient has had cystoscopy without any abnormalities.  Patient with leukocytosis and evidence of UTI on urinalysis.  Urine cultured.  Patient received IV fluids and 1 dose of Rocephin while in the emergency department.  Patient is afebrile, nontoxic appearing, vital signs stable and able  to maintain O2 sats of 100% on room air. Patient will be discharged home with antibiotics to treat her UTI and outpatient follow up to urology as recommended.  On reassessment and discussion of disposition plan patient continues to be nontoxic appearing with vital signs stable.  Patient is agreeable to plan of care of outpatient follow up, provided clear return precautions and demonstrated understanding.    [JG]      ED Course User Index  [JG] Simon Avila, PA      Recent Results (from the past 24 hour(s))   Comprehensive Metabolic Panel    Collection Time: 05/01/20  7:34 PM   Result Value Ref Range    Glucose 115 (H) 65 - 99 mg/dL    BUN 11 6 - 20 mg/dL    Creatinine 1.08 (H) 0.57 - 1.00 mg/dL    Sodium 140 136 - 145 mmol/L    Potassium 3.6 3.5 - 5.2 mmol/L    Chloride 101 98 - 107 mmol/L    CO2 25.0 22.0 - 29.0 mmol/L    Calcium 9.8 8.6 - 10.5 mg/dL    Total Protein 7.8 6.0 - 8.5 g/dL    Albumin 4.40 3.50 - 5.20 g/dL    ALT (SGPT) 16 1 - 33 U/L    AST (SGOT) 16 1 - 32 U/L    Alkaline Phosphatase 72 39 - 117 U/L    Total Bilirubin 0.5 0.2 - 1.2 mg/dL    eGFR Non African Amer 55 (L) >60 mL/min/1.73    Globulin 3.4 gm/dL    A/G Ratio 1.3 g/dL    BUN/Creatinine Ratio 10.2 7.0 - 25.0    Anion Gap 14.0 5.0 - 15.0 mmol/L   Lipase    Collection Time: 05/01/20  7:34 PM   Result Value Ref Range    Lipase 26 13 - 60 U/L   Urinalysis With Microscopic If Indicated (No Culture) - Urine, Clean Catch    Collection Time: 05/01/20  7:34 PM   Result Value Ref Range    Color, UA Dark Yellow (A) Yellow, Straw    Appearance, UA Turbid (A) Clear    pH, UA 5.5 5.0 - 8.0    Specific Gravity, UA 1.032 (H) 1.001 - 1.030    Glucose, UA Negative Negative    Ketones, UA 15 mg/dL (1+) (A) Negative    Bilirubin, UA Small (1+) (A) Negative    Blood, UA Small (1+) (A) Negative    Protein, UA >=300 mg/dL (3+) (A) Negative    Leuk Esterase, UA Moderate (2+) (A) Negative    Nitrite, UA Negative Negative    Urobilinogen, UA 1.0 E.U./dL 0.2 - 1.0  E.U./dL   Green Top (Gel)    Collection Time: 05/01/20  7:34 PM   Result Value Ref Range    Extra Tube Hold for add-ons.    Lavender Top    Collection Time: 05/01/20  7:34 PM   Result Value Ref Range    Extra Tube hold for add-on    CBC Auto Differential    Collection Time: 05/01/20  7:34 PM   Result Value Ref Range    WBC 15.47 (H) 3.40 - 10.80 10*3/mm3    RBC 4.85 3.77 - 5.28 10*6/mm3    Hemoglobin 14.1 12.0 - 15.9 g/dL    Hematocrit 43.8 34.0 - 46.6 %    MCV 90.3 79.0 - 97.0 fL    MCH 29.1 26.6 - 33.0 pg    MCHC 32.2 31.5 - 35.7 g/dL    RDW 12.6 12.3 - 15.4 %    RDW-SD 41.7 37.0 - 54.0 fl    MPV 10.9 6.0 - 12.0 fL    Platelets 372 140 - 450 10*3/mm3    Neutrophil % 76.2 (H) 42.7 - 76.0 %    Lymphocyte % 16.8 (L) 19.6 - 45.3 %    Monocyte % 5.8 5.0 - 12.0 %    Eosinophil % 0.3 0.3 - 6.2 %    Basophil % 0.4 0.0 - 1.5 %    Immature Grans % 0.5 0.0 - 0.5 %    Neutrophils, Absolute 11.79 (H) 1.70 - 7.00 10*3/mm3    Lymphocytes, Absolute 2.60 0.70 - 3.10 10*3/mm3    Monocytes, Absolute 0.90 0.10 - 0.90 10*3/mm3    Eosinophils, Absolute 0.05 0.00 - 0.40 10*3/mm3    Basophils, Absolute 0.06 0.00 - 0.20 10*3/mm3    Immature Grans, Absolute 0.07 (H) 0.00 - 0.05 10*3/mm3    nRBC 0.0 0.0 - 0.2 /100 WBC   Urinalysis, Microscopic Only - Urine, Clean Catch    Collection Time: 05/01/20  7:34 PM   Result Value Ref Range    RBC, UA 21-30 (A) None Seen, 0-2 /HPF    WBC, UA Too Numerous to Count (A) None Seen, 0-2 /HPF    Bacteria, UA 3+ (A) None Seen, Trace /HPF    Squamous Epithelial Cells, UA 21-30 (A) None Seen, 0-2 /HPF    Hyaline Casts, UA 0-6 0 - 6 /LPF    Methodology Manual Light Microscopy      Note: In addition to lab results from this visit, the labs listed above may include labs taken at another facility or during a different encounter within the last 24 hours. Please correlate lab times with ED admission and discharge times for further clarification of the services performed during this visit.    No orders to display      Vitals:    05/01/20 2030 05/01/20 2045 05/01/20 2100 05/01/20 2115   BP: 141/79 143/81 146/74 147/83   Pulse: 84   87   Resp: 16   16   Temp:       SpO2: 100% 100% 100% 100%   Weight:       Height:         Medications   sodium chloride 0.9 % flush 10 mL (has no administration in time range)   sodium chloride 0.9 % bolus 1,000 mL (0 mL Intravenous Stopped 5/1/20 2125)   cefTRIAXone (ROCEPHIN) 1 g/50 mL 0.9% NS VTB (mbp) (1 g Intravenous Given 5/1/20 2021)     ECG/EMG Results (last 24 hours)     ** No results found for the last 24 hours. **        No orders to display                                          MDM  Number of Diagnoses or Management Options  Recurrent UTI (urinary tract infection): new and requires workup     Amount and/or Complexity of Data Reviewed  Clinical lab tests: reviewed  Decide to obtain previous medical records or to obtain history from someone other than the patient: yes    Risk of Complications, Morbidity, and/or Mortality  Presenting problems: moderate  Diagnostic procedures: moderate  Management options: moderate    Patient Progress  Patient progress: stable      Final diagnoses:   Recurrent UTI (urinary tract infection)            Simon Avila PA  05/01/20 2128

## 2020-05-03 LAB — BACTERIA SPEC AEROBE CULT: NORMAL

## 2020-05-21 ENCOUNTER — OFFICE VISIT (OUTPATIENT)
Dept: NEUROLOGY | Facility: CLINIC | Age: 45
End: 2020-05-21

## 2020-05-21 VITALS
SYSTOLIC BLOOD PRESSURE: 128 MMHG | BODY MASS INDEX: 29.82 KG/M2 | OXYGEN SATURATION: 98 % | TEMPERATURE: 98.6 F | HEART RATE: 76 BPM | DIASTOLIC BLOOD PRESSURE: 84 MMHG | WEIGHT: 190 LBS | HEIGHT: 67 IN

## 2020-05-21 DIAGNOSIS — G43.C0 PERIODIC HEADACHE SYNDROME, NOT INTRACTABLE: Primary | ICD-10-CM

## 2020-05-21 PROCEDURE — 99245 OFF/OP CONSLTJ NEW/EST HI 55: CPT | Performed by: PSYCHIATRY & NEUROLOGY

## 2020-05-21 RX ORDER — BUSPIRONE HYDROCHLORIDE 7.5 MG/1
TABLET ORAL
Status: ON HOLD | COMMUNITY
Start: 2020-04-21 | End: 2022-07-27

## 2020-05-21 RX ORDER — BACLOFEN 10 MG/1
TABLET ORAL
COMMUNITY
Start: 2020-04-20 | End: 2020-06-22

## 2020-05-21 RX ORDER — PROPRANOLOL HYDROCHLORIDE 20 MG/1
TABLET ORAL
COMMUNITY
Start: 2020-04-21 | End: 2020-06-22

## 2020-05-21 NOTE — PROGRESS NOTES
Subjective  Answers for HPI/ROS submitted by the patient on 5/19/2020   Dysuria  What is the primary reason for your visit?: Painful Urination    Patient ID: Halle De Jesus is a 44 y.o. female     Chief Complaint   Patient presents with   • UMU Virus Antibody Positive        History of Present Illness    44 y.o. female referred by Dr Dajuan Meng for cognitive changes.  November 6 developed urinary infections.  Treated with multiple rounds of antibiotics.      Trouble with comprehension.  Severe fatigue and unable to do most ADL's.    Assoc with pain in left kidney, burning and urgency.      Missing things at work.      2004 had tremor in right hand.  MRI Brain normal.  Two years had bilateral elbow pain.  Rheumatology evaluation unremarkable.      HA are 2 - 3 times a week.  Located in forehead.  Quality is sharp/aching.   Sensitive to light.  Moderate intensity.  Lasts for days.     Reviewed medical records:    Pt with recurrent painful urination, frequency, urgency, burning.  Sx present for 7 months and have been constant.  PMH of Kidney stones, recurrent UTI's,     Urological evaluation found to have UMU virus. Cystoscopy unremarkable.      Labs CMP, CBC SBC 15.47  U/A 5/1/20 abnormal     Past Medical History:   Diagnosis Date   • Asthma    • Seasonal allergies    • Sepsis (CMS/HCC)    • Urinary tract infection      History reviewed. No pertinent family history.  Social History     Socioeconomic History   • Marital status:      Spouse name: Not on file   • Number of children: Not on file   • Years of education: Not on file   • Highest education level: Not on file   Tobacco Use   • Smoking status: Never Smoker   Substance and Sexual Activity   • Alcohol use: Yes     Alcohol/week: 2.0 standard drinks     Types: 1 Glasses of wine, 1 Cans of beer per week     Comment: usually one per month   • Drug use: No   • Sexual activity: Yes     Partners: Male       Review of Systems   Constitutional: Positive  "for chills, fatigue and fever. Negative for activity change and unexpected weight change.   HENT: Negative for tinnitus and trouble swallowing.    Eyes: Negative for photophobia and visual disturbance.   Respiratory: Negative for apnea, cough and choking.    Cardiovascular: Negative for leg swelling.   Gastrointestinal: Positive for nausea and vomiting.   Endocrine: Negative for cold intolerance and heat intolerance.   Genitourinary: Positive for dysuria, frequency, hematuria and urgency. Negative for difficulty urinating and menstrual problem.   Musculoskeletal: Negative for back pain, gait problem, myalgias and neck pain.   Skin: Negative for color change and rash.   Allergic/Immunologic: Negative for immunocompromised state.   Neurological: Negative for dizziness, tremors, seizures, syncope, facial asymmetry, speech difficulty, weakness, light-headedness, numbness and headaches.   Hematological: Negative for adenopathy. Does not bruise/bleed easily.   Psychiatric/Behavioral: Positive for decreased concentration. Negative for behavioral problems, confusion, hallucinations and sleep disturbance.       Objective     Vitals:    05/21/20 0923   BP: 128/84   Pulse: 76   Temp: 98.6 °F (37 °C)   SpO2: 98%   Weight: 86.2 kg (190 lb)   Height: 170.2 cm (67\")       Neurologic Exam     Mental Status   Oriented to person, place, and time.   Registration: recalls 3 of 3 objects. Recall at 5 minutes: recalls 3 of 3 objects. Follows 3 step commands.   Attention: normal. Concentration: normal.   Speech: speech is normal   Level of consciousness: alert  Knowledge: good and consistent with education.   Able to name object. Able to read. Able to repeat. Able to write. Normal comprehension.     Cranial Nerves     CN II   Visual fields full to confrontation.   Visual acuity: normal  Right visual field deficit: none  Left visual field deficit: none     CN III, IV, VI   Pupils are equal, round, and reactive to light.  Extraocular " motions are normal.   Right pupil: Shape: regular. Reactivity: brisk. Consensual response: intact.   Left pupil: Shape: regular. Reactivity: brisk. Consensual response: intact.   Nystagmus: none   Diplopia: none  Ophthalmoparesis: none  Upgaze: normal  Downgaze: normal  Conjugate gaze: present  Vestibulo-ocular reflex: present    CN V   Facial sensation intact.   Right corneal reflex: normal  Left corneal reflex: normal    CN VII   Right facial weakness: none  Left facial weakness: none    CN VIII   Hearing: intact    CN IX, X   Palate: symmetric  Right gag reflex: normal  Left gag reflex: normal    CN XI   Right sternocleidomastoid strength: normal  Left sternocleidomastoid strength: normal    CN XII   Tongue: not atrophic  Fasciculations: absent  Tongue deviation: none    Motor Exam   Muscle bulk: normal  Overall muscle tone: normal  Right arm tone: normal  Left arm tone: normal  Right leg tone: normal  Left leg tone: normal    Strength   Strength 5/5 throughout.     Sensory Exam   Light touch normal.   Vibration normal.   Proprioception normal.   Pinprick normal.     Gait, Coordination, and Reflexes     Gait  Gait: normal    Coordination   Romberg: negative  Finger to nose coordination: normal  Heel to shin coordination: normal  Tandem walking coordination: normal    Tremor   Resting tremor: absent  Intention tremor: absent  Action tremor: absent    Reflexes   Reflexes 2+ except as noted.       Physical Exam   Constitutional: She is oriented to person, place, and time. Vital signs are normal. She appears well-developed and well-nourished.   HENT:   Head: Normocephalic and atraumatic.   Eyes: Pupils are equal, round, and reactive to light. EOM and lids are normal.   Fundoscopic exam:       The right eye shows no exudate, no hemorrhage and no papilledema. The right eye shows venous pulsations.        The left eye shows no exudate, no hemorrhage and no papilledema. The left eye shows venous pulsations.   Neck:  Normal range of motion and phonation normal. Normal carotid pulses present. Carotid bruit is not present. No thyroid mass and no thyromegaly present.   Cardiovascular: Normal rate, regular rhythm and normal heart sounds.   Pulmonary/Chest: Effort normal.   Neurological: She is oriented to person, place, and time. She has normal strength. She has a normal Finger-Nose-Finger Test, a normal Heel to Shin Test, a normal Romberg Test and a normal Tandem Gait Test. Gait normal.   Skin: Skin is warm and dry.   Psychiatric: She has a normal mood and affect. Her speech is normal.   Nursing note and vitals reviewed.      Admission on 05/01/2020, Discharged on 05/01/2020   Component Date Value Ref Range Status   • Glucose 05/01/2020 115* 65 - 99 mg/dL Final   • BUN 05/01/2020 11  6 - 20 mg/dL Final   • Creatinine 05/01/2020 1.08* 0.57 - 1.00 mg/dL Final   • Sodium 05/01/2020 140  136 - 145 mmol/L Final   • Potassium 05/01/2020 3.6  3.5 - 5.2 mmol/L Final   • Chloride 05/01/2020 101  98 - 107 mmol/L Final   • CO2 05/01/2020 25.0  22.0 - 29.0 mmol/L Final   • Calcium 05/01/2020 9.8  8.6 - 10.5 mg/dL Final   • Total Protein 05/01/2020 7.8  6.0 - 8.5 g/dL Final   • Albumin 05/01/2020 4.40  3.50 - 5.20 g/dL Final   • ALT (SGPT) 05/01/2020 16  1 - 33 U/L Final   • AST (SGOT) 05/01/2020 16  1 - 32 U/L Final   • Alkaline Phosphatase 05/01/2020 72  39 - 117 U/L Final   • Total Bilirubin 05/01/2020 0.5  0.2 - 1.2 mg/dL Final   • eGFR Non African Amer 05/01/2020 55* >60 mL/min/1.73 Final   • Globulin 05/01/2020 3.4  gm/dL Final   • A/G Ratio 05/01/2020 1.3  g/dL Final   • BUN/Creatinine Ratio 05/01/2020 10.2  7.0 - 25.0 Final   • Anion Gap 05/01/2020 14.0  5.0 - 15.0 mmol/L Final   • Lipase 05/01/2020 26  13 - 60 U/L Final   • Color,  05/01/2020 Dark Yellow* Yellow, Straw Final   • Appearance,  05/01/2020 Turbid* Clear Final   • pH,  05/01/2020 5.5  5.0 - 8.0 Final   • Specific Gravity,  05/01/2020 1.032* 1.001 - 1.030 Final   •  Glucose, UA 05/01/2020 Negative  Negative Final   • Ketones, UA 05/01/2020 15 mg/dL (1+)* Negative Final   • Bilirubin, UA 05/01/2020 Small (1+)* Negative Final   • Blood, UA 05/01/2020 Small (1+)* Negative Final   • Protein, UA 05/01/2020 >=300 mg/dL (3+)* Negative Final   • Leuk Esterase, UA 05/01/2020 Moderate (2+)* Negative Final   • Nitrite, UA 05/01/2020 Negative  Negative Final   • Urobilinogen, UA 05/01/2020 1.0 E.U./dL  0.2 - 1.0 E.U./dL Final   • Extra Tube 05/01/2020 Hold for add-ons.   Final    Auto resulted.   • Extra Tube 05/01/2020 hold for add-on   Final    Auto resulted   • WBC 05/01/2020 15.47* 3.40 - 10.80 10*3/mm3 Final   • RBC 05/01/2020 4.85  3.77 - 5.28 10*6/mm3 Final   • Hemoglobin 05/01/2020 14.1  12.0 - 15.9 g/dL Final   • Hematocrit 05/01/2020 43.8  34.0 - 46.6 % Final   • MCV 05/01/2020 90.3  79.0 - 97.0 fL Final   • MCH 05/01/2020 29.1  26.6 - 33.0 pg Final   • MCHC 05/01/2020 32.2  31.5 - 35.7 g/dL Final   • RDW 05/01/2020 12.6  12.3 - 15.4 % Final   • RDW-SD 05/01/2020 41.7  37.0 - 54.0 fl Final   • MPV 05/01/2020 10.9  6.0 - 12.0 fL Final   • Platelets 05/01/2020 372  140 - 450 10*3/mm3 Final   • Neutrophil % 05/01/2020 76.2* 42.7 - 76.0 % Final   • Lymphocyte % 05/01/2020 16.8* 19.6 - 45.3 % Final   • Monocyte % 05/01/2020 5.8  5.0 - 12.0 % Final   • Eosinophil % 05/01/2020 0.3  0.3 - 6.2 % Final   • Basophil % 05/01/2020 0.4  0.0 - 1.5 % Final   • Immature Grans % 05/01/2020 0.5  0.0 - 0.5 % Final   • Neutrophils, Absolute 05/01/2020 11.79* 1.70 - 7.00 10*3/mm3 Final   • Lymphocytes, Absolute 05/01/2020 2.60  0.70 - 3.10 10*3/mm3 Final   • Monocytes, Absolute 05/01/2020 0.90  0.10 - 0.90 10*3/mm3 Final   • Eosinophils, Absolute 05/01/2020 0.05  0.00 - 0.40 10*3/mm3 Final   • Basophils, Absolute 05/01/2020 0.06  0.00 - 0.20 10*3/mm3 Final   • Immature Grans, Absolute 05/01/2020 0.07* 0.00 - 0.05 10*3/mm3 Final   • nRBC 05/01/2020 0.0  0.0 - 0.2 /100 WBC Final   • RBC, UA 05/01/2020  21-30* None Seen, 0-2 /HPF Final   • WBC, UA 05/01/2020 Too Numerous to Count* None Seen, 0-2 /HPF Final   • Bacteria, UA 05/01/2020 3+* None Seen, Trace /HPF Final   • Squamous Epithelial Cells, UA 05/01/2020 21-30* None Seen, 0-2 /HPF Final   • Hyaline Casts, UA 05/01/2020 0-6  0 - 6 /LPF Final   • Methodology 05/01/2020 Manual Light Microscopy   Final   • Urine Culture 05/01/2020 >100,000 CFU/mL Mixed Anamaria Isolated   Final         Assessment/Plan     Problem List Items Addressed This Visit        Cardiovascular and Mediastinum    Periodic headache syndrome, not intractable - Primary    Current Assessment & Plan     Pt has recurrent UTI and has decreased cognition     Urine viral cx JCV    MRI Brain  LP            Relevant Medications    propranolol (INDERAL) 20 MG tablet    baclofen (LIORESAL) 10 MG tablet    Other Relevant Orders    MRI Brain With & Without Contrast    IR Lumbar Puncture Diagnosis             No follow-ups on file.

## 2020-05-27 ENCOUNTER — OFFICE VISIT (OUTPATIENT)
Dept: NEUROLOGY | Facility: CLINIC | Age: 45
End: 2020-05-27

## 2020-05-27 VITALS
DIASTOLIC BLOOD PRESSURE: 80 MMHG | TEMPERATURE: 98.2 F | BODY MASS INDEX: 29.82 KG/M2 | HEIGHT: 67 IN | SYSTOLIC BLOOD PRESSURE: 126 MMHG | WEIGHT: 190 LBS | HEART RATE: 98 BPM | OXYGEN SATURATION: 97 %

## 2020-05-27 DIAGNOSIS — G43.C0 PERIODIC HEADACHE SYNDROME, NOT INTRACTABLE: Primary | ICD-10-CM

## 2020-05-27 LAB
APPEARANCE CSF: CLEAR
APPEARANCE CSF: CLEAR
COLOR CSF: COLORLESS
COLOR CSF: COLORLESS
COLOR SPUN CSF: COLORLESS
COLOR SPUN CSF: COLORLESS
GLUCOSE CSF-MCNC: 60 MG/DL (ref 40–70)
PROT CSF-MCNC: 34.8 MG/DL (ref 15–45)
RBC # CSF MANUAL: 83 /MM3 (ref 0–5)
RBC # CSF MANUAL: 97 /MM3 (ref 0–5)
SPECIMEN VOL CSF: 8 ML
SPECIMEN VOL CSF: 8 ML
TUBE # CSF: 1
TUBE # CSF: 4
WBC # CSF MANUAL: 2 /MM3 (ref 0–5)
WBC # CSF MANUAL: 5 /MM3 (ref 0–5)

## 2020-05-27 PROCEDURE — 83916 OLIGOCLONAL BANDS: CPT | Performed by: PSYCHIATRY & NEUROLOGY

## 2020-05-27 PROCEDURE — 88112 CYTOPATH CELL ENHANCE TECH: CPT | Performed by: PSYCHIATRY & NEUROLOGY

## 2020-05-27 PROCEDURE — 82945 GLUCOSE OTHER FLUID: CPT | Performed by: PSYCHIATRY & NEUROLOGY

## 2020-05-27 PROCEDURE — 89050 BODY FLUID CELL COUNT: CPT | Performed by: PSYCHIATRY & NEUROLOGY

## 2020-05-27 PROCEDURE — 82784 ASSAY IGA/IGD/IGG/IGM EACH: CPT | Performed by: PSYCHIATRY & NEUROLOGY

## 2020-05-27 PROCEDURE — 87206 SMEAR FLUORESCENT/ACID STAI: CPT | Performed by: PSYCHIATRY & NEUROLOGY

## 2020-05-27 PROCEDURE — 82042 OTHER SOURCE ALBUMIN QUAN EA: CPT | Performed by: PSYCHIATRY & NEUROLOGY

## 2020-05-27 PROCEDURE — 84157 ASSAY OF PROTEIN OTHER: CPT | Performed by: PSYCHIATRY & NEUROLOGY

## 2020-05-27 PROCEDURE — 62270 DX LMBR SPI PNXR: CPT | Performed by: PSYCHIATRY & NEUROLOGY

## 2020-05-27 PROCEDURE — 82040 ASSAY OF SERUM ALBUMIN: CPT | Performed by: PSYCHIATRY & NEUROLOGY

## 2020-05-27 PROCEDURE — 86592 SYPHILIS TEST NON-TREP QUAL: CPT | Performed by: PSYCHIATRY & NEUROLOGY

## 2020-05-27 PROCEDURE — 82164 ANGIOTENSIN I ENZYME TEST: CPT | Performed by: PSYCHIATRY & NEUROLOGY

## 2020-05-27 PROCEDURE — 87798 DETECT AGENT NOS DNA AMP: CPT | Performed by: PSYCHIATRY & NEUROLOGY

## 2020-05-27 PROCEDURE — 87116 MYCOBACTERIA CULTURE: CPT | Performed by: PSYCHIATRY & NEUROLOGY

## 2020-05-27 PROCEDURE — 87327 CRYPTOCOCCUS NEOFORM AG IA: CPT | Performed by: PSYCHIATRY & NEUROLOGY

## 2020-05-27 NOTE — PROGRESS NOTES
Procedure Note:    Informed consent on chart    Procedure:  Lumbar puncture.    Anesthesia: 2% lidocaine    Positioning: Upright seated position     Pt prepped and draped in sterile fashion.      L3/4 interspace injected 2% lidocaine.      4 tubes of 2 cc clear CSF obtained.  No complications    Opening pressure:  8  cm H20     Fluid sent to lab for analysis

## 2020-05-28 LAB — CRYPTOC AG CSF QL LA: NEGATIVE

## 2020-05-29 LAB
ALB CSF/SERPL: NORMAL {RATIO}
ALBUMIN CSF-MCNC: 21 MG/DL (ref 11–48)
ALBUMIN SERPL-MCNC: NORMAL G/DL
IGG CSF-MCNC: 1.9 MG/DL (ref 0–8.6)
IGG SERPL-MCNC: NORMAL MG/DL
IGG SYNTH RATE SER+CSF CALC-MRATE: NORMAL MG/(24.H)
IGG/ALB CLEAR SER+CSF-RTO: NORMAL
IGG/ALB CSF: 0.09 {RATIO} (ref 0–0.25)
OLIGOCLONAL BANDS.IT SER+CSF QL: NORMAL

## 2020-06-01 LAB
ACE CSF-CCNC: <1.5 U/L (ref 0–2.5)
LAB AP CASE REPORT: NORMAL
PATH REPORT.FINAL DX SPEC: NORMAL

## 2020-06-02 LAB — REAGIN AB CSF QL: NON REACTIVE

## 2020-06-03 ENCOUNTER — HOSPITAL ENCOUNTER (OUTPATIENT)
Dept: MRI IMAGING | Facility: HOSPITAL | Age: 45
Discharge: HOME | End: 2020-06-03
Admitting: PSYCHIATRY & NEUROLOGY

## 2020-06-03 DIAGNOSIS — G43.C0 PERIODIC HEADACHE SYNDROME, NOT INTRACTABLE: ICD-10-CM

## 2020-06-03 PROCEDURE — 70553 MRI BRAIN STEM W/O & W/DYE: CPT

## 2020-06-03 PROCEDURE — A9577 INJ MULTIHANCE: HCPCS | Performed by: PSYCHIATRY & NEUROLOGY

## 2020-06-03 PROCEDURE — 0 GADOBENATE DIMEGLUMINE 529 MG/ML SOLUTION: Performed by: PSYCHIATRY & NEUROLOGY

## 2020-06-03 RX ADMIN — GADOBENATE DIMEGLUMINE 17 ML: 529 INJECTION, SOLUTION INTRAVENOUS at 09:05

## 2020-06-06 LAB — JCPYV DNA CSF QL NAA+PROBE: NEGATIVE

## 2020-06-10 ENCOUNTER — OFFICE VISIT (OUTPATIENT)
Dept: NEUROLOGY | Facility: CLINIC | Age: 45
End: 2020-06-10

## 2020-06-10 VITALS
OXYGEN SATURATION: 99 % | TEMPERATURE: 97.7 F | HEIGHT: 67 IN | WEIGHT: 185 LBS | DIASTOLIC BLOOD PRESSURE: 80 MMHG | HEART RATE: 97 BPM | SYSTOLIC BLOOD PRESSURE: 125 MMHG | BODY MASS INDEX: 29.03 KG/M2

## 2020-06-10 DIAGNOSIS — G43.C0 PERIODIC HEADACHE SYNDROME, NOT INTRACTABLE: Primary | ICD-10-CM

## 2020-06-10 PROCEDURE — 99213 OFFICE O/P EST LOW 20 MIN: CPT | Performed by: PSYCHIATRY & NEUROLOGY

## 2020-06-22 ENCOUNTER — HOSPITAL ENCOUNTER (EMERGENCY)
Facility: HOSPITAL | Age: 45
Discharge: HOME | End: 2020-06-22
Attending: EMERGENCY MEDICINE | Admitting: EMERGENCY MEDICINE

## 2020-06-22 VITALS
RESPIRATION RATE: 18 BRPM | WEIGHT: 185 LBS | DIASTOLIC BLOOD PRESSURE: 79 MMHG | SYSTOLIC BLOOD PRESSURE: 135 MMHG | TEMPERATURE: 97.5 F | BODY MASS INDEX: 29.03 KG/M2 | HEART RATE: 83 BPM | OXYGEN SATURATION: 100 % | HEIGHT: 67 IN

## 2020-06-22 DIAGNOSIS — Z87.09 HISTORY OF ASTHMA: ICD-10-CM

## 2020-06-22 DIAGNOSIS — N39.0 RECURRENT UTI: Primary | ICD-10-CM

## 2020-06-22 DIAGNOSIS — R11.0 NAUSEA: ICD-10-CM

## 2020-06-22 DIAGNOSIS — J30.2 SEASONAL ALLERGIES: ICD-10-CM

## 2020-06-22 DIAGNOSIS — R50.9 LOW GRADE FEVER: ICD-10-CM

## 2020-06-22 DIAGNOSIS — R10.9 LEFT FLANK PAIN: ICD-10-CM

## 2020-06-22 LAB
ALBUMIN SERPL-MCNC: 4.4 G/DL (ref 3.5–5.2)
ALBUMIN/GLOB SERPL: 1.3 G/DL
ALP SERPL-CCNC: 65 U/L (ref 39–117)
ALT SERPL W P-5'-P-CCNC: 21 U/L (ref 1–33)
ANION GAP SERPL CALCULATED.3IONS-SCNC: 12 MMOL/L (ref 5–15)
AST SERPL-CCNC: 19 U/L (ref 1–32)
BACTERIA UR QL AUTO: ABNORMAL /HPF
BASOPHILS # BLD AUTO: 0.07 10*3/MM3 (ref 0–0.2)
BASOPHILS NFR BLD AUTO: 0.7 % (ref 0–1.5)
BILIRUB SERPL-MCNC: 0.5 MG/DL (ref 0.2–1.2)
BILIRUB UR QL STRIP: NEGATIVE
BUN BLD-MCNC: 12 MG/DL (ref 6–20)
BUN/CREAT SERPL: 11.2 (ref 7–25)
CALCIUM SPEC-SCNC: 9.3 MG/DL (ref 8.6–10.5)
CHLORIDE SERPL-SCNC: 101 MMOL/L (ref 98–107)
CLARITY UR: CLEAR
CO2 SERPL-SCNC: 24 MMOL/L (ref 22–29)
COLOR UR: YELLOW
CREAT BLD-MCNC: 1.07 MG/DL (ref 0.57–1)
DEPRECATED RDW RBC AUTO: 41.4 FL (ref 37–54)
EOSINOPHIL # BLD AUTO: 0.25 10*3/MM3 (ref 0–0.4)
EOSINOPHIL NFR BLD AUTO: 2.3 % (ref 0.3–6.2)
ERYTHROCYTE [DISTWIDTH] IN BLOOD BY AUTOMATED COUNT: 12.6 % (ref 12.3–15.4)
GFR SERPL CREATININE-BSD FRML MDRD: 56 ML/MIN/1.73
GLOBULIN UR ELPH-MCNC: 3.4 GM/DL
GLUCOSE BLD-MCNC: 105 MG/DL (ref 65–99)
GLUCOSE UR STRIP-MCNC: NEGATIVE MG/DL
HCT VFR BLD AUTO: 42.7 % (ref 34–46.6)
HGB BLD-MCNC: 13.8 G/DL (ref 12–15.9)
HGB UR QL STRIP.AUTO: ABNORMAL
HYALINE CASTS UR QL AUTO: ABNORMAL /LPF
IMM GRANULOCYTES # BLD AUTO: 0.02 10*3/MM3 (ref 0–0.05)
IMM GRANULOCYTES NFR BLD AUTO: 0.2 % (ref 0–0.5)
KETONES UR QL STRIP: NEGATIVE
LEUKOCYTE ESTERASE UR QL STRIP.AUTO: ABNORMAL
LYMPHOCYTES # BLD AUTO: 3.2 10*3/MM3 (ref 0.7–3.1)
LYMPHOCYTES NFR BLD AUTO: 30 % (ref 19.6–45.3)
MCH RBC QN AUTO: 28.9 PG (ref 26.6–33)
MCHC RBC AUTO-ENTMCNC: 32.3 G/DL (ref 31.5–35.7)
MCV RBC AUTO: 89.5 FL (ref 79–97)
MONOCYTES # BLD AUTO: 0.72 10*3/MM3 (ref 0.1–0.9)
MONOCYTES NFR BLD AUTO: 6.7 % (ref 5–12)
NEUTROPHILS # BLD AUTO: 6.41 10*3/MM3 (ref 1.7–7)
NEUTROPHILS NFR BLD AUTO: 60.1 % (ref 42.7–76)
NITRITE UR QL STRIP: NEGATIVE
NRBC BLD AUTO-RTO: 0 /100 WBC (ref 0–0.2)
PH UR STRIP.AUTO: 6 [PH] (ref 5–8)
PLATELET # BLD AUTO: 360 10*3/MM3 (ref 140–450)
PMV BLD AUTO: 11.2 FL (ref 6–12)
POTASSIUM BLD-SCNC: 3.9 MMOL/L (ref 3.5–5.2)
PROT SERPL-MCNC: 7.8 G/DL (ref 6–8.5)
PROT UR QL STRIP: NEGATIVE
RBC # BLD AUTO: 4.77 10*6/MM3 (ref 3.77–5.28)
RBC # UR: ABNORMAL /HPF
REF LAB TEST METHOD: ABNORMAL
SODIUM BLD-SCNC: 137 MMOL/L (ref 136–145)
SP GR UR STRIP: 1.01 (ref 1–1.03)
SQUAMOUS #/AREA URNS HPF: ABNORMAL /HPF
UROBILINOGEN UR QL STRIP: ABNORMAL
WBC NRBC COR # BLD: 10.67 10*3/MM3 (ref 3.4–10.8)
WBC UR QL AUTO: ABNORMAL /HPF

## 2020-06-22 PROCEDURE — 81001 URINALYSIS AUTO W/SCOPE: CPT | Performed by: EMERGENCY MEDICINE

## 2020-06-22 PROCEDURE — 85025 COMPLETE CBC W/AUTO DIFF WBC: CPT | Performed by: PHYSICIAN ASSISTANT

## 2020-06-22 PROCEDURE — 80053 COMPREHEN METABOLIC PANEL: CPT | Performed by: PHYSICIAN ASSISTANT

## 2020-06-22 PROCEDURE — 87086 URINE CULTURE/COLONY COUNT: CPT | Performed by: EMERGENCY MEDICINE

## 2020-06-22 PROCEDURE — 25010000002 CEFTRIAXONE PER 250 MG: Performed by: PHYSICIAN ASSISTANT

## 2020-06-22 PROCEDURE — 99284 EMERGENCY DEPT VISIT MOD MDM: CPT

## 2020-06-22 PROCEDURE — 96365 THER/PROPH/DIAG IV INF INIT: CPT

## 2020-06-22 RX ORDER — PROMETHAZINE HYDROCHLORIDE 12.5 MG/1
12.5 TABLET ORAL EVERY 6 HOURS PRN
Qty: 12 TABLET | Refills: 0 | Status: ON HOLD | OUTPATIENT
Start: 2020-06-22 | End: 2022-07-27

## 2020-06-22 RX ORDER — PHENAZOPYRIDINE HYDROCHLORIDE 100 MG/1
200 TABLET, FILM COATED ORAL ONCE
Status: COMPLETED | OUTPATIENT
Start: 2020-06-22 | End: 2020-06-22

## 2020-06-22 RX ORDER — CEFDINIR 300 MG/1
300 CAPSULE ORAL 2 TIMES DAILY
Qty: 14 CAPSULE | Refills: 0 | OUTPATIENT
Start: 2020-06-22 | End: 2020-06-29

## 2020-06-22 RX ADMIN — SODIUM CHLORIDE 1000 ML: 9 INJECTION, SOLUTION INTRAVENOUS at 02:25

## 2020-06-22 RX ADMIN — PHENAZOPYRIDINE HYDROCHLORIDE 200 MG: 100 TABLET ORAL at 02:29

## 2020-06-22 RX ADMIN — CEFTRIAXONE SODIUM 1 G: 1 INJECTION, POWDER, FOR SOLUTION INTRAMUSCULAR; INTRAVENOUS at 03:30

## 2020-06-22 NOTE — ED PROVIDER NOTES
Subjective   This is a 45-year-old female that presents to the ER with sudden onset of left flank and CVA pain that started earlier today.  Patient has history of recurrent urinary tract infections.  Her most recent urinary tract infection was May 1, 2020.  Urine culture from 5/1/2020 showed growth of mixed mckenzie.  Patient has been followed by Dr. Angelo, urology.  Patient says that she has had cystoscopy per urology, but there has been no significant indication of why patient continues to get UTIs so frequently.  She has been on multiple antibiotics and had recurrent UTIs since November, 2020.  She has been on Levaquin several times, as well as Omnicef.  Last menstrual period was less than 1 week ago.  She reports chills and low-grade fever with T-max 100.  She reports nausea but denies any vomiting.  Previous abdominal surgeries include left oophorectomy.  Patient denies any bowel changes.  She denies any cough, congestion, chest pain, or shortness of breath.  She denies any known exposure to coronavirus.  Patient states that she has contacted [facility] due to recurrent infections and is going to have a follow-up in the near future.  No other concerns at this time.      History provided by:  Patient  Flank Pain   Pain location:  L flank  Pain quality: aching    Pain radiates to:  Back (Left CVA)  Onset quality:  Sudden  Duration:  8 hours  Timing:  Constant  Progression:  Unchanged  Chronicity:  Recurrent (Recurrent urinary tract infections since November, 2019)  Context: previous surgery (Left oophorectomy)    Context: not alcohol use, not sick contacts and not suspicious food intake    Context comment:  Patient reports recurrent UTIs since November, 2019.  Most recent UTI was May 1, 2020.  Urine culture grew out mixed mckenzie.  Patient reports onset of left flank and CVA pain this evening with chills and low-grade temp.  Nausea without vomiting.  Relieved by:  Nothing  Worsened by:   Nothing  Ineffective treatments:  None tried  Associated symptoms: anorexia, chills, dysuria, fever (T-max 100) and nausea    Associated symptoms: no chest pain, no constipation, no cough, no diarrhea, no fatigue, no hematochezia, no hematuria, no shortness of breath and no vomiting    Associated symptoms comment:  Urinary frequency and urgency  Risk factors: has not had multiple surgeries        Review of Systems   Constitutional: Positive for appetite change, chills and fever (T-max 100). Negative for fatigue.   HENT: Negative.    Respiratory: Negative for cough, chest tightness and shortness of breath.    Cardiovascular: Negative for chest pain.   Gastrointestinal: Positive for anorexia and nausea. Negative for abdominal pain, constipation, diarrhea, hematochezia and vomiting.   Genitourinary: Positive for dysuria, flank pain, frequency and urgency. Negative for hematuria.        Recurrent UTIs.  Last UTI was May 1, 2020.  LMP: <1 week   Musculoskeletal: Positive for back pain (Left CVA pain).   All other systems reviewed and are negative.      Past Medical History:   Diagnosis Date   • Asthma    • Positive Cedrick Cunningham ([facility]) virus antibody    • Seasonal allergies    • Sepsis (CMS/HCC)    • Urinary tract infection        Allergies   Allergen Reactions   • Sulfa Antibiotics Other (See Comments)     Loses voice reaction as child       Past Surgical History:   Procedure Laterality Date   • DIAGNOSTIC LAPAROSCOPY EXPLORATORY LAPAROTOMY N/A 7/20/2016    Procedure: DIAGNOSTIC LAPAROSCOPY EXPLORATORY LAPAROTOMY;  Surgeon: Katherine Hernandez MD;  Location: [facility] OR;  Service:    • MOUTH SURGERY     • OVARIAN CYST DRAINAGE/EXCISION N/A 7/20/2016    Procedure: DIAGNOSTIC LAPAROSCOPY, OVARIAN CYSTECTOMY POSSIBLE OOPHORECTOMY, POSSIBLE LASER;  Surgeon: Katherine Hernandez MD;  Location: [facility] OR;  Service:        History reviewed. No pertinent family history.    Social History     Socioeconomic History   • Marital status:       Spouse name: Not on file   • Number of children: Not on file   • Years of education: Not on file   • Highest education level: Not on file   Tobacco Use   • Smoking status: Never Smoker   • Smokeless tobacco: Never Used   Substance and Sexual Activity   • Alcohol use: Yes     Alcohol/week: 2.0 standard drinks     Types: 1 Glasses of wine, 1 Cans of beer per week     Comment: usually one per month   • Drug use: No   • Sexual activity: Yes     Partners: Male           Objective   Physical Exam   Constitutional: She is oriented to person, place, and time. She appears well-developed and well-nourished. No distress.   HENT:   Head: Normocephalic and atraumatic.   Mouth/Throat: Oropharynx is clear and moist.   Eyes: Pupils are equal, round, and reactive to light. Conjunctivae and EOM are normal. No scleral icterus.   Neck: Normal range of motion. Neck supple.   Cardiovascular: Normal rate, regular rhythm and normal heart sounds.   Pulmonary/Chest: Effort normal and breath sounds normal. No accessory muscle usage. No tachypnea. No respiratory distress. She has no decreased breath sounds. She has no wheezes. She has no rhonchi.   Abdominal: Soft. Bowel sounds are normal. She exhibits no distension. There is no hepatosplenomegaly. There is tenderness. There is CVA tenderness. There is no rigidity, no rebound and no guarding. No hernia.       Abdomen soft without distention.  Active bowel sounds.  Tenderness to palpation to left flank and left CVA region.  No particular abdominal tenderness.  Abdominal exam is benign.   Musculoskeletal: Normal range of motion. She exhibits no edema.   Lymphadenopathy:     She has no cervical adenopathy.   Neurological: She is alert and oriented to person, place, and time.   Skin: Skin is warm and dry. She is not diaphoretic.   Psychiatric: She has a normal mood and affect. Her behavior is normal.   Nursing note and vitals reviewed.      Procedures           ED Course  ED Course as  of Jun 22 0348   Mon Jun 22, 2020   0341 CBC and chemistries were completely within normal limits.  Urinalysis revealed too numerous to count white blood cells, no bacteria, and 3+ leukocytes.  Nitrite was negative.  Urine culture is in process.  Patient is afebrile with temp 97.5.  Exam is benign.  Patient has had no vomiting.  After reviewing epic in detail.  Patient's last urine culture from 5/1/2020 grew out mixed mckenzie.  She has had multiple urine cultures, including dates 3/16/2020, 1/30/2020, 12/22/2019, and 12/17/2019 that had no growth.  Patient's urine culture from 1/8/2020 grew out greater than 100,000 colony count of Raoultella planticola, which was essentially pansensitive other than ampicillin.  Patient has been followed by Dr. Angelo, urologist.  We ordered a dose of Rocephin 1 g IV and gave 1 L of normal saline as a bolus.  Recommend close urology evaluation.  Patient also is planning on following up at [facility] due to recurrent infections to be consulted by infectious disease.  I think that is a good idea.  Vital signs and exam are stable.  Patient is ready for discharge to home and agreeable with above treatment plan.    [FC]   0343 We will prescribe Omnicef on discharge and Phenergan every 6 hours as needed for nausea/vomiting.    [FC]      ED Course User Index  [FC] Edna Pozo, MARTA           Recent Results (from the past 24 hour(s))   Urinalysis With Culture If Indicated - Urine, Clean Catch    Collection Time: 06/22/20  1:23 AM   Result Value Ref Range    Color, UA Yellow Yellow, Straw    Appearance, UA Clear Clear    pH, UA 6.0 5.0 - 8.0    Specific Gravity, UA 1.006 1.001 - 1.030    Glucose, UA Negative Negative    Ketones, UA Negative Negative    Bilirubin, UA Negative Negative    Blood, UA Moderate (2+) (A) Negative    Protein, UA Negative Negative    Leuk Esterase, UA Large (3+) (A) Negative    Nitrite, UA Negative Negative    Urobilinogen, UA 0.2 E.U./dL 0.2 - 1.0 E.U./dL    Urinalysis, Microscopic Only - Urine, Clean Catch    Collection Time: 06/22/20  1:23 AM   Result Value Ref Range    RBC, UA 0-2 None Seen, 0-2 /HPF    WBC, UA Too Numerous to Count (A) None Seen, 0-2 /HPF    Bacteria, UA None Seen None Seen, Trace /HPF    Squamous Epithelial Cells, UA 0-2 None Seen, 0-2 /HPF    Hyaline Casts, UA 0-6 0 - 6 /LPF    Methodology Automated Microscopy    Comprehensive Metabolic Panel    Collection Time: 06/22/20  2:24 AM   Result Value Ref Range    Glucose 105 (H) 65 - 99 mg/dL    BUN 12 6 - 20 mg/dL    Creatinine 1.07 (H) 0.57 - 1.00 mg/dL    Sodium 137 136 - 145 mmol/L    Potassium 3.9 3.5 - 5.2 mmol/L    Chloride 101 98 - 107 mmol/L    CO2 24.0 22.0 - 29.0 mmol/L    Calcium 9.3 8.6 - 10.5 mg/dL    Total Protein 7.8 6.0 - 8.5 g/dL    Albumin 4.40 3.50 - 5.20 g/dL    ALT (SGPT) 21 1 - 33 U/L    AST (SGOT) 19 1 - 32 U/L    Alkaline Phosphatase 65 39 - 117 U/L    Total Bilirubin 0.5 0.2 - 1.2 mg/dL    eGFR Non African Amer 56 (L) >60 mL/min/1.73    Globulin 3.4 gm/dL    A/G Ratio 1.3 g/dL    BUN/Creatinine Ratio 11.2 7.0 - 25.0    Anion Gap 12.0 5.0 - 15.0 mmol/L   CBC Auto Differential    Collection Time: 06/22/20  2:24 AM   Result Value Ref Range    WBC 10.67 3.40 - 10.80 10*3/mm3    RBC 4.77 3.77 - 5.28 10*6/mm3    Hemoglobin 13.8 12.0 - 15.9 g/dL    Hematocrit 42.7 34.0 - 46.6 %    MCV 89.5 79.0 - 97.0 fL    MCH 28.9 26.6 - 33.0 pg    MCHC 32.3 31.5 - 35.7 g/dL    RDW 12.6 12.3 - 15.4 %    RDW-SD 41.4 37.0 - 54.0 fl    MPV 11.2 6.0 - 12.0 fL    Platelets 360 140 - 450 10*3/mm3    Neutrophil % 60.1 42.7 - 76.0 %    Lymphocyte % 30.0 19.6 - 45.3 %    Monocyte % 6.7 5.0 - 12.0 %    Eosinophil % 2.3 0.3 - 6.2 %    Basophil % 0.7 0.0 - 1.5 %    Immature Grans % 0.2 0.0 - 0.5 %    Neutrophils, Absolute 6.41 1.70 - 7.00 10*3/mm3    Lymphocytes, Absolute 3.20 (H) 0.70 - 3.10 10*3/mm3    Monocytes, Absolute 0.72 0.10 - 0.90 10*3/mm3    Eosinophils, Absolute 0.25 0.00 - 0.40 10*3/mm3     "Basophils, Absolute 0.07 0.00 - 0.20 10*3/mm3    Immature Grans, Absolute 0.02 0.00 - 0.05 10*3/mm3    nRBC 0.0 0.0 - 0.2 /100 WBC     Note: In addition to lab results from this visit, the labs listed above may include labs taken at another facility or during a different encounter within the last 24 hours. Please correlate lab times with ED admission and discharge times for further clarification of the services performed during this visit.    No orders to display     Vitals:    06/22/20 0047   BP: 155/92   BP Location: Left arm   Patient Position: Sitting   Pulse: 79   Resp: 16   Temp: 97.5 °F (36.4 °C)   TempSrc: Infrared   SpO2: 100%   Weight: 83.9 kg (185 lb)   Height: 170.2 cm (67\")     Medications   cefTRIAXone (ROCEPHIN) 1 g/100 mL 0.9% NS (MBP) (has no administration in time range)   sodium chloride 0.9 % bolus 1,000 mL (1,000 mL Intravenous New Bag 6/22/20 0225)   phenazopyridine (PYRIDIUM) tablet 200 mg (200 mg Oral Given 6/22/20 0229)     ECG/EMG Results (last 24 hours)     ** No results found for the last 24 hours. **        No orders to display                                       MDM    Final diagnoses:   Recurrent UTI   Nausea   Low grade fever   Left flank pain   History of asthma   Seasonal allergies            Edna Pozo PA-C  06/22/20 0348    "

## 2020-06-22 NOTE — DISCHARGE INSTRUCTIONS
ER evaluation reveals recurrent urinary tract infection.  Patient had too numerous to count white blood cells and large, 3+ leukocytes.  Urine culture is in process.  CBC and chemistries were completely normal.  Recommend patient to increase water intake.  Avoid caffeine.  Recommend first available follow-up with Dr. Angelo, urologist, secondary to recurrent urinary tract infections.  Patient was given IV Rocephin and will prescribe Omnicef 300 mg by mouth twice daily x7 days, as well as Phenergan 12.5 mg by mouth every 6 hours as needed for nausea.  Return to the ER sooner if any worsening symptoms.

## 2020-06-24 LAB — BACTERIA SPEC AEROBE CULT: NORMAL

## 2020-06-29 ENCOUNTER — HOSPITAL ENCOUNTER (EMERGENCY)
Facility: HOSPITAL | Age: 45
Discharge: HOME | End: 2020-06-29
Attending: EMERGENCY MEDICINE | Admitting: EMERGENCY MEDICINE

## 2020-06-29 VITALS
BODY MASS INDEX: 29.03 KG/M2 | HEIGHT: 67 IN | RESPIRATION RATE: 16 BRPM | TEMPERATURE: 97.8 F | OXYGEN SATURATION: 99 % | WEIGHT: 185 LBS | DIASTOLIC BLOOD PRESSURE: 91 MMHG | HEART RATE: 80 BPM | SYSTOLIC BLOOD PRESSURE: 140 MMHG

## 2020-06-29 DIAGNOSIS — R30.0 DYSURIA: Primary | ICD-10-CM

## 2020-06-29 LAB
ALBUMIN SERPL-MCNC: 4.8 G/DL (ref 3.5–5.2)
ALBUMIN/GLOB SERPL: 1.3 G/DL
ALP SERPL-CCNC: 72 U/L (ref 39–117)
ALT SERPL W P-5'-P-CCNC: 20 U/L (ref 1–33)
ANION GAP SERPL CALCULATED.3IONS-SCNC: 11 MMOL/L (ref 5–15)
AST SERPL-CCNC: 18 U/L (ref 1–32)
B-HCG UR QL: NEGATIVE
BASOPHILS # BLD AUTO: 0.06 10*3/MM3 (ref 0–0.2)
BASOPHILS NFR BLD AUTO: 0.7 % (ref 0–1.5)
BILIRUB SERPL-MCNC: 0.3 MG/DL (ref 0.2–1.2)
BILIRUB UR QL STRIP: NEGATIVE
BUN SERPL-MCNC: 9 MG/DL (ref 6–20)
BUN/CREAT SERPL: 8.8 (ref 7–25)
CALCIUM SPEC-SCNC: 9.5 MG/DL (ref 8.6–10.5)
CHLORIDE SERPL-SCNC: 104 MMOL/L (ref 98–107)
CLARITY UR: CLEAR
CO2 SERPL-SCNC: 27 MMOL/L (ref 22–29)
COLOR UR: YELLOW
CREAT SERPL-MCNC: 1.02 MG/DL (ref 0.57–1)
D-LACTATE SERPL-SCNC: 1 MMOL/L (ref 0.5–2)
DEPRECATED RDW RBC AUTO: 41.8 FL (ref 37–54)
EOSINOPHIL # BLD AUTO: 0.27 10*3/MM3 (ref 0–0.4)
EOSINOPHIL NFR BLD AUTO: 3 % (ref 0.3–6.2)
ERYTHROCYTE [DISTWIDTH] IN BLOOD BY AUTOMATED COUNT: 12.6 % (ref 12.3–15.4)
GFR SERPL CREATININE-BSD FRML MDRD: 59 ML/MIN/1.73
GLOBULIN UR ELPH-MCNC: 3.6 GM/DL
GLUCOSE SERPL-MCNC: 95 MG/DL (ref 65–99)
GLUCOSE UR STRIP-MCNC: NEGATIVE MG/DL
HCT VFR BLD AUTO: 46.6 % (ref 34–46.6)
HGB BLD-MCNC: 15 G/DL (ref 12–15.9)
HGB UR QL STRIP.AUTO: NEGATIVE
HOLD SPECIMEN: NORMAL
HOLD SPECIMEN: NORMAL
IMM GRANULOCYTES # BLD AUTO: 0.03 10*3/MM3 (ref 0–0.05)
IMM GRANULOCYTES NFR BLD AUTO: 0.3 % (ref 0–0.5)
INTERNAL NEGATIVE CONTROL: NEGATIVE
INTERNAL POSITIVE CONTROL: POSITIVE
KETONES UR QL STRIP: NEGATIVE
LEUKOCYTE ESTERASE UR QL STRIP.AUTO: NEGATIVE
LIPASE SERPL-CCNC: 27 U/L (ref 13–60)
LYMPHOCYTES # BLD AUTO: 3.01 10*3/MM3 (ref 0.7–3.1)
LYMPHOCYTES NFR BLD AUTO: 33.4 % (ref 19.6–45.3)
Lab: NORMAL
MCH RBC QN AUTO: 29 PG (ref 26.6–33)
MCHC RBC AUTO-ENTMCNC: 32.2 G/DL (ref 31.5–35.7)
MCV RBC AUTO: 90 FL (ref 79–97)
MONOCYTES # BLD AUTO: 0.53 10*3/MM3 (ref 0.1–0.9)
MONOCYTES NFR BLD AUTO: 5.9 % (ref 5–12)
NEUTROPHILS NFR BLD AUTO: 5.1 10*3/MM3 (ref 1.7–7)
NEUTROPHILS NFR BLD AUTO: 56.7 % (ref 42.7–76)
NITRITE UR QL STRIP: NEGATIVE
NRBC BLD AUTO-RTO: 0 /100 WBC (ref 0–0.2)
PH UR STRIP.AUTO: 7 [PH] (ref 5–8)
PLATELET # BLD AUTO: 400 10*3/MM3 (ref 140–450)
PMV BLD AUTO: 10.7 FL (ref 6–12)
POTASSIUM SERPL-SCNC: 4.1 MMOL/L (ref 3.5–5.2)
PROT SERPL-MCNC: 8.4 G/DL (ref 6–8.5)
PROT UR QL STRIP: NEGATIVE
RBC # BLD AUTO: 5.18 10*6/MM3 (ref 3.77–5.28)
SODIUM SERPL-SCNC: 142 MMOL/L (ref 136–145)
SP GR UR STRIP: <=1.005 (ref 1–1.03)
UROBILINOGEN UR QL STRIP: NORMAL
WBC # BLD AUTO: 9 10*3/MM3 (ref 3.4–10.8)
WHOLE BLOOD HOLD SPECIMEN: NORMAL
WHOLE BLOOD HOLD SPECIMEN: NORMAL

## 2020-06-29 PROCEDURE — 87086 URINE CULTURE/COLONY COUNT: CPT | Performed by: PHYSICIAN ASSISTANT

## 2020-06-29 PROCEDURE — 96365 THER/PROPH/DIAG IV INF INIT: CPT

## 2020-06-29 PROCEDURE — 87040 BLOOD CULTURE FOR BACTERIA: CPT | Performed by: PHYSICIAN ASSISTANT

## 2020-06-29 PROCEDURE — 85025 COMPLETE CBC W/AUTO DIFF WBC: CPT | Performed by: EMERGENCY MEDICINE

## 2020-06-29 PROCEDURE — 81025 URINE PREGNANCY TEST: CPT

## 2020-06-29 PROCEDURE — 83605 ASSAY OF LACTIC ACID: CPT | Performed by: PHYSICIAN ASSISTANT

## 2020-06-29 PROCEDURE — 25010000002 ERTAPENEM PER 500 MG: Performed by: PHYSICIAN ASSISTANT

## 2020-06-29 PROCEDURE — 99283 EMERGENCY DEPT VISIT LOW MDM: CPT

## 2020-06-29 PROCEDURE — 81025 URINE PREGNANCY TEST: CPT | Performed by: EMERGENCY MEDICINE

## 2020-06-29 PROCEDURE — 80053 COMPREHEN METABOLIC PANEL: CPT | Performed by: EMERGENCY MEDICINE

## 2020-06-29 PROCEDURE — 83690 ASSAY OF LIPASE: CPT | Performed by: EMERGENCY MEDICINE

## 2020-06-29 PROCEDURE — 81003 URINALYSIS AUTO W/O SCOPE: CPT

## 2020-06-29 RX ORDER — PHENAZOPYRIDINE HYDROCHLORIDE 200 MG/1
200 TABLET, FILM COATED ORAL 3 TIMES DAILY PRN
Qty: 6 TABLET | Refills: 0 | Status: ON HOLD | OUTPATIENT
Start: 2020-06-29 | End: 2022-07-27

## 2020-06-29 RX ORDER — SODIUM CHLORIDE 0.9 % (FLUSH) 0.9 %
10 SYRINGE (ML) INJECTION AS NEEDED
Status: DISCONTINUED | OUTPATIENT
Start: 2020-06-29 | End: 2020-06-29 | Stop reason: HOSPADM

## 2020-06-29 RX ORDER — NITROFURANTOIN 25; 75 MG/1; MG/1
100 CAPSULE ORAL 2 TIMES DAILY
Qty: 20 CAPSULE | Refills: 0 | Status: ON HOLD | OUTPATIENT
Start: 2020-06-29 | End: 2022-07-27

## 2020-06-29 RX ADMIN — ERTAPENEM SODIUM 1 G: 1 INJECTION, POWDER, LYOPHILIZED, FOR SOLUTION INTRAMUSCULAR; INTRAVENOUS at 16:16

## 2020-07-01 LAB — BACTERIA SPEC AEROBE CULT: ABNORMAL

## 2020-07-04 LAB
BACTERIA SPEC AEROBE CULT: NORMAL
BACTERIA SPEC AEROBE CULT: NORMAL

## 2020-07-08 LAB
MYCOBACTERIUM SPEC CULT: NORMAL
NIGHT BLUE STAIN TISS: NORMAL

## 2020-10-13 ENCOUNTER — HOSPITAL ENCOUNTER (EMERGENCY)
Facility: HOSPITAL | Age: 45
Discharge: HOME | End: 2020-10-14
Attending: EMERGENCY MEDICINE | Admitting: EMERGENCY MEDICINE

## 2020-10-13 DIAGNOSIS — N39.0 URINARY TRACT INFECTION IN FEMALE: Primary | ICD-10-CM

## 2020-10-13 LAB
ALBUMIN SERPL-MCNC: 4.7 G/DL (ref 3.5–5.2)
ALBUMIN/GLOB SERPL: 1.6 G/DL
ALP SERPL-CCNC: 68 U/L (ref 39–117)
ALT SERPL W P-5'-P-CCNC: 13 U/L (ref 1–33)
ANION GAP SERPL CALCULATED.3IONS-SCNC: 9 MMOL/L (ref 5–15)
AST SERPL-CCNC: 19 U/L (ref 1–32)
B-HCG UR QL: NEGATIVE
BACTERIA UR QL AUTO: ABNORMAL /HPF
BASOPHILS # BLD AUTO: 0.05 10*3/MM3 (ref 0–0.2)
BASOPHILS NFR BLD AUTO: 0.5 % (ref 0–1.5)
BILIRUB SERPL-MCNC: 0.3 MG/DL (ref 0–1.2)
BILIRUB UR QL STRIP: NEGATIVE
BUN SERPL-MCNC: 14 MG/DL (ref 6–20)
BUN/CREAT SERPL: 13.7 (ref 7–25)
CALCIUM SPEC-SCNC: 9.4 MG/DL (ref 8.6–10.5)
CHLORIDE SERPL-SCNC: 105 MMOL/L (ref 98–107)
CLARITY UR: CLEAR
CO2 SERPL-SCNC: 27 MMOL/L (ref 22–29)
COLOR UR: ABNORMAL
CREAT SERPL-MCNC: 1.02 MG/DL (ref 0.57–1)
DEPRECATED RDW RBC AUTO: 41.1 FL (ref 37–54)
EOSINOPHIL # BLD AUTO: 0.25 10*3/MM3 (ref 0–0.4)
EOSINOPHIL NFR BLD AUTO: 2.4 % (ref 0.3–6.2)
ERYTHROCYTE [DISTWIDTH] IN BLOOD BY AUTOMATED COUNT: 12.5 % (ref 12.3–15.4)
GFR SERPL CREATININE-BSD FRML MDRD: 59 ML/MIN/1.73
GLOBULIN UR ELPH-MCNC: 3 GM/DL
GLUCOSE SERPL-MCNC: 89 MG/DL (ref 65–99)
GLUCOSE UR STRIP-MCNC: NEGATIVE MG/DL
HCT VFR BLD AUTO: 45 % (ref 34–46.6)
HGB BLD-MCNC: 14.4 G/DL (ref 12–15.9)
HGB UR QL STRIP.AUTO: ABNORMAL
HOLD SPECIMEN: NORMAL
HOLD SPECIMEN: NORMAL
HYALINE CASTS UR QL AUTO: ABNORMAL /LPF
IMM GRANULOCYTES # BLD AUTO: 0.03 10*3/MM3 (ref 0–0.05)
IMM GRANULOCYTES NFR BLD AUTO: 0.3 % (ref 0–0.5)
KETONES UR QL STRIP: NEGATIVE
LEUKOCYTE ESTERASE UR QL STRIP.AUTO: ABNORMAL
LIPASE SERPL-CCNC: 43 U/L (ref 13–60)
LYMPHOCYTES # BLD AUTO: 3.71 10*3/MM3 (ref 0.7–3.1)
LYMPHOCYTES NFR BLD AUTO: 36.1 % (ref 19.6–45.3)
MCH RBC QN AUTO: 28.7 PG (ref 26.6–33)
MCHC RBC AUTO-ENTMCNC: 32 G/DL (ref 31.5–35.7)
MCV RBC AUTO: 89.6 FL (ref 79–97)
MONOCYTES # BLD AUTO: 0.62 10*3/MM3 (ref 0.1–0.9)
MONOCYTES NFR BLD AUTO: 6 % (ref 5–12)
NEUTROPHILS NFR BLD AUTO: 5.62 10*3/MM3 (ref 1.7–7)
NEUTROPHILS NFR BLD AUTO: 54.7 % (ref 42.7–76)
NITRITE UR QL STRIP: NEGATIVE
NRBC BLD AUTO-RTO: 0 /100 WBC (ref 0–0.2)
PH UR STRIP.AUTO: 6.5 [PH] (ref 5–8)
PLATELET # BLD AUTO: 369 10*3/MM3 (ref 140–450)
PMV BLD AUTO: 11.3 FL (ref 6–12)
POTASSIUM SERPL-SCNC: 4.8 MMOL/L (ref 3.5–5.2)
PROT SERPL-MCNC: 7.7 G/DL (ref 6–8.5)
PROT UR QL STRIP: ABNORMAL
RBC # BLD AUTO: 5.02 10*6/MM3 (ref 3.77–5.28)
RBC # UR: ABNORMAL /HPF
REF LAB TEST METHOD: ABNORMAL
SODIUM SERPL-SCNC: 141 MMOL/L (ref 136–145)
SP GR UR STRIP: <=1.005 (ref 1–1.03)
SQUAMOUS #/AREA URNS HPF: ABNORMAL /HPF
UROBILINOGEN UR QL STRIP: ABNORMAL
WBC # BLD AUTO: 10.28 10*3/MM3 (ref 3.4–10.8)
WBC UR QL AUTO: ABNORMAL /HPF
WHOLE BLOOD HOLD SPECIMEN: NORMAL
WHOLE BLOOD HOLD SPECIMEN: NORMAL
YEAST URNS QL MICRO: ABNORMAL /HPF

## 2020-10-13 PROCEDURE — 83690 ASSAY OF LIPASE: CPT

## 2020-10-13 PROCEDURE — 25010000002 CEFTRIAXONE PER 250 MG: Performed by: NURSE PRACTITIONER

## 2020-10-13 PROCEDURE — 96365 THER/PROPH/DIAG IV INF INIT: CPT

## 2020-10-13 PROCEDURE — 81025 URINE PREGNANCY TEST: CPT | Performed by: EMERGENCY MEDICINE

## 2020-10-13 PROCEDURE — 81001 URINALYSIS AUTO W/SCOPE: CPT | Performed by: EMERGENCY MEDICINE

## 2020-10-13 PROCEDURE — 80053 COMPREHEN METABOLIC PANEL: CPT

## 2020-10-13 PROCEDURE — 99283 EMERGENCY DEPT VISIT LOW MDM: CPT

## 2020-10-13 PROCEDURE — 85025 COMPLETE CBC W/AUTO DIFF WBC: CPT

## 2020-10-13 RX ORDER — SODIUM CHLORIDE 0.9 % (FLUSH) 0.9 %
10 SYRINGE (ML) INJECTION AS NEEDED
Status: DISCONTINUED | OUTPATIENT
Start: 2020-10-13 | End: 2020-10-14 | Stop reason: HOSPADM

## 2020-10-13 RX ORDER — CEFDINIR 300 MG/1
300 CAPSULE ORAL 2 TIMES DAILY
Qty: 20 CAPSULE | Refills: 0 | OUTPATIENT
Start: 2020-10-13 | End: 2021-05-18

## 2020-10-13 RX ADMIN — SODIUM CHLORIDE 1000 ML: 9 INJECTION, SOLUTION INTRAVENOUS at 23:23

## 2020-10-13 RX ADMIN — CEFTRIAXONE SODIUM 2 G: 2 INJECTION, POWDER, FOR SOLUTION INTRAMUSCULAR; INTRAVENOUS at 23:25

## 2020-10-14 VITALS
OXYGEN SATURATION: 100 % | TEMPERATURE: 98 F | HEART RATE: 68 BPM | BODY MASS INDEX: 28.25 KG/M2 | SYSTOLIC BLOOD PRESSURE: 132 MMHG | RESPIRATION RATE: 16 BRPM | HEIGHT: 67 IN | DIASTOLIC BLOOD PRESSURE: 74 MMHG | WEIGHT: 180 LBS

## 2020-10-14 NOTE — ED PROVIDER NOTES
Subjective   Ms. Halle Bryan is a 45 y.o female presenting to the emergency department with complaints of dysuria. She has a history of frequent urinary tract infections including 4 within the past year. She finished a round of Doxycycline 6 days ago for her most recent urinary tract infection. She complains of symptoms beginning 2 days ago and she believes she has another UTI. She presents with urinary frequency, burning, urgency, chills, and pain in her left flank and suprapubic abdomen. She states when she gets a UTI, they get bad quickly. She is seen at the [facility] and follows with Dr. Eduardo Angelo, urologist. She denies fever, nausea, and vomiting. There are no other acute symptoms at this time.      History provided by:  Patient  Dysuria  Pain quality:  Burning  Pain severity:  Moderate  Onset quality:  Sudden  Duration:  2 days  Timing:  Constant  Progression:  Worsening  Chronicity:  Recurrent  Recent urinary tract infections: yes    Relieved by:  None tried  Worsened by:  Nothing  Ineffective treatments:  None tried  Urinary symptoms: frequent urination    Urinary symptoms: no hematuria    Associated symptoms: abdominal pain and flank pain    Associated symptoms: no fever, no nausea and no vomiting    Abdominal pain:     Location:  L flank and suprapubic  Risk factors: recurrent urinary tract infections        Review of Systems   Constitutional: Positive for chills. Negative for fever.   Respiratory: Negative for cough and shortness of breath.    Cardiovascular: Negative for chest pain.   Gastrointestinal: Positive for abdominal pain. Negative for nausea and vomiting.   Genitourinary: Positive for dysuria, flank pain, frequency and urgency.   All other systems reviewed and are negative.      Past Medical History:   Diagnosis Date   • Asthma    • Positive Cedrick Cunningham ([facility]) virus antibody    • Seasonal allergies    • Sepsis (CMS/[facility])    • Urinary tract infection        Allergies   Allergen  Reactions   • Sulfa Antibiotics Other (See Comments)     Loses voice reaction as child       Past Surgical History:   Procedure Laterality Date   • DIAGNOSTIC LAPAROSCOPY EXPLORATORY LAPAROTOMY N/A 7/20/2016    Procedure: DIAGNOSTIC LAPAROSCOPY EXPLORATORY LAPAROTOMY;  Surgeon: Katherine Hernandez MD;  Location: [facility] OR;  Service:    • MOUTH SURGERY     • OVARIAN CYST DRAINAGE/EXCISION N/A 7/20/2016    Procedure: DIAGNOSTIC LAPAROSCOPY, OVARIAN CYSTECTOMY POSSIBLE OOPHORECTOMY, POSSIBLE LASER;  Surgeon: Katherine Hernandez MD;  Location: [facility] OR;  Service:        No family history on file.    Social History     Socioeconomic History   • Marital status:      Spouse name: Not on file   • Number of children: Not on file   • Years of education: Not on file   • Highest education level: Not on file   Tobacco Use   • Smoking status: Never Smoker   • Smokeless tobacco: Never Used   Substance and Sexual Activity   • Alcohol use: Yes     Alcohol/week: 2.0 standard drinks     Types: 1 Glasses of wine, 1 Cans of beer per week     Comment: usually one per month   • Drug use: No   • Sexual activity: Yes     Partners: Male         Objective   Physical Exam  Constitutional:       Appearance: Normal appearance. She is well-developed. She is not ill-appearing or toxic-appearing.   HENT:      Head: Normocephalic and atraumatic.      Nose: Nose normal.      Mouth/Throat:      Mouth: Mucous membranes are moist.   Eyes:      General: Lids are normal.      Extraocular Movements: Extraocular movements intact.      Conjunctiva/sclera: Conjunctivae normal.      Pupils: Pupils are equal, round, and reactive to light.   Neck:      Musculoskeletal: Full passive range of motion without pain and normal range of motion.      Trachea: Trachea normal.   Cardiovascular:      Rate and Rhythm: Regular rhythm.      Pulses: Normal pulses.      Heart sounds: Normal heart sounds.   Pulmonary:      Effort: Pulmonary effort is normal. No respiratory  distress.      Breath sounds: Normal breath sounds. No decreased breath sounds, wheezing, rhonchi or rales.   Abdominal:      General: Bowel sounds are normal. There is no distension.      Palpations: Abdomen is soft.      Tenderness: There is no abdominal tenderness.   Musculoskeletal: Normal range of motion.   Skin:     General: Skin is warm and dry.      Findings: No rash.   Neurological:      Mental Status: She is alert and oriented to person, place, and time.      Cranial Nerves: No cranial nerve deficit.   Psychiatric:         Speech: Speech normal.         Behavior: Behavior normal. Behavior is cooperative.         Procedures         ED Course  ED Course as of Oct 13 2245   Tue Oct 13, 2020   2138 Blood, UA(!): Large (3+) [KG]   2138 Leukocytes, UA(!): Large (3+) [KG]   2144 WBC, UA(!): Too Numerous to Count [KG]   2202   COVID-19 RISK SCREEN    Has the patient had close contact without PPE with a lab confirmed COVID-19 (+) person or a person under investigation (PUI) for COVID-19 infection?  -- No     Has the patient had respiratory symptoms, worsened/new cough and/or SOA, unexplained fever, or sudden loss of smell and/or taste in the past 7 days? --  No    Does the patient have baseline higher exposure risk such as working in healthcare field or currently residing in healthcare facility?  --  No          [HV]   2242 Patient is currently on her menstrual cycle.  Patient is followed by infectious disease and urology at the [facility].  Patient explains she also sees Dr. Angelo here in [place].  Patient will be given a dose of Rocephin 2 g via IV.  Patient will be discharged home with a prescription for Omnicef.  Patient to follow-up as planned.  Patient agrees and verbalized understanding.    [KG]      ED Course User Index  [HV] Amanda Palacios  [KG] Cathryn Damon, JOSE RAFAEL     Recent Results (from the past 24 hour(s))   Comprehensive Metabolic Panel    Collection Time: 10/13/20  7:32 PM     Specimen: Blood   Result Value Ref Range    Glucose 89 65 - 99 mg/dL    BUN 14 6 - 20 mg/dL    Creatinine 1.02 (H) 0.57 - 1.00 mg/dL    Sodium 141 136 - 145 mmol/L    Potassium 4.8 3.5 - 5.2 mmol/L    Chloride 105 98 - 107 mmol/L    CO2 27.0 22.0 - 29.0 mmol/L    Calcium 9.4 8.6 - 10.5 mg/dL    Total Protein 7.7 6.0 - 8.5 g/dL    Albumin 4.70 3.50 - 5.20 g/dL    ALT (SGPT) 13 1 - 33 U/L    AST (SGOT) 19 1 - 32 U/L    Alkaline Phosphatase 68 39 - 117 U/L    Total Bilirubin 0.3 0.0 - 1.2 mg/dL    eGFR Non African Amer 59 (L) >60 mL/min/1.73    Globulin 3.0 gm/dL    A/G Ratio 1.6 g/dL    BUN/Creatinine Ratio 13.7 7.0 - 25.0    Anion Gap 9.0 5.0 - 15.0 mmol/L   Lipase    Collection Time: 10/13/20  7:32 PM    Specimen: Blood   Result Value Ref Range    Lipase 43 13 - 60 U/L   Light Blue Top    Collection Time: 10/13/20  7:32 PM   Result Value Ref Range    Extra Tube hold for add-on    Green Top (Gel)    Collection Time: 10/13/20  7:32 PM   Result Value Ref Range    Extra Tube Hold for add-ons.    Lavender Top    Collection Time: 10/13/20  7:32 PM   Result Value Ref Range    Extra Tube hold for add-on    Gold Top - SST    Collection Time: 10/13/20  7:32 PM   Result Value Ref Range    Extra Tube Hold for add-ons.    CBC Auto Differential    Collection Time: 10/13/20  7:32 PM    Specimen: Blood   Result Value Ref Range    WBC 10.28 3.40 - 10.80 10*3/mm3    RBC 5.02 3.77 - 5.28 10*6/mm3    Hemoglobin 14.4 12.0 - 15.9 g/dL    Hematocrit 45.0 34.0 - 46.6 %    MCV 89.6 79.0 - 97.0 fL    MCH 28.7 26.6 - 33.0 pg    MCHC 32.0 31.5 - 35.7 g/dL    RDW 12.5 12.3 - 15.4 %    RDW-SD 41.1 37.0 - 54.0 fl    MPV 11.3 6.0 - 12.0 fL    Platelets 369 140 - 450 10*3/mm3    Neutrophil % 54.7 42.7 - 76.0 %    Lymphocyte % 36.1 19.6 - 45.3 %    Monocyte % 6.0 5.0 - 12.0 %    Eosinophil % 2.4 0.3 - 6.2 %    Basophil % 0.5 0.0 - 1.5 %    Immature Grans % 0.3 0.0 - 0.5 %    Neutrophils, Absolute 5.62 1.70 - 7.00 10*3/mm3    Lymphocytes, Absolute  3.71 (H) 0.70 - 3.10 10*3/mm3    Monocytes, Absolute 0.62 0.10 - 0.90 10*3/mm3    Eosinophils, Absolute 0.25 0.00 - 0.40 10*3/mm3    Basophils, Absolute 0.05 0.00 - 0.20 10*3/mm3    Immature Grans, Absolute 0.03 0.00 - 0.05 10*3/mm3    nRBC 0.0 0.0 - 0.2 /100 WBC   Urinalysis With Microscopic If Indicated (No Culture) - Urine, Clean Catch    Collection Time: 10/13/20  7:39 PM    Specimen: Urine, Clean Catch   Result Value Ref Range    Color, UA Red (A) Yellow, Straw    Appearance, UA Clear Clear    pH, UA 6.5 5.0 - 8.0    Specific Gravity, UA <=1.005 1.001 - 1.030    Glucose, UA Negative Negative    Ketones, UA Negative Negative    Bilirubin, UA Negative Negative    Blood, UA Large (3+) (A) Negative    Protein, UA 30 mg/dL (1+) (A) Negative    Leuk Esterase, UA Large (3+) (A) Negative    Nitrite, UA Negative Negative    Urobilinogen, UA 0.2 E.U./dL 0.2 - 1.0 E.U./dL   Pregnancy, Urine - Urine, Clean Catch    Collection Time: 10/13/20  7:39 PM    Specimen: Urine, Clean Catch   Result Value Ref Range    HCG, Urine QL Negative Negative   Urinalysis, Microscopic Only - Urine, Clean Catch    Collection Time: 10/13/20  7:39 PM    Specimen: Urine, Clean Catch   Result Value Ref Range    RBC, UA Too Numerous to Count (A) None Seen, 0-2 /HPF    WBC, UA Too Numerous to Count (A) None Seen, 0-2 /HPF    Bacteria, UA None Seen None Seen, Trace /HPF    Squamous Epithelial Cells, UA 0-2 None Seen, 0-2 /HPF    Yeast, UA Moderate/2+ Budding Yeast w/Hyphae None Seen /HPF    Hyaline Casts, UA 0-6 0 - 6 /LPF    Methodology Manual Light Microscopy      Note: In addition to lab results from this visit, the labs listed above may include labs taken at another facility or during a different encounter within the last 24 hours. Please correlate lab times with ED admission and discharge times for further clarification of the services performed during this visit.    No orders to display     Vitals:    10/13/20 1859   BP: 155/88   BP Location:  "Left arm   Patient Position: Sitting   Pulse: 82   Resp: 16   Temp: 98 °F (36.7 °C)   SpO2: 100%   Weight: 81.6 kg (180 lb)   Height: 170.2 cm (67\")     Medications   sodium chloride 0.9 % flush 10 mL (has no administration in time range)   sodium chloride 0.9 % flush 10 mL (has no administration in time range)   sodium chloride 0.9 % bolus 1,000 mL (has no administration in time range)   cefTRIAXone (ROCEPHIN) 2 g/100 mL 0.9% NS IVPB (MBP) (has no administration in time range)     ECG/EMG Results (last 24 hours)     ** No results found for the last 24 hours. **        No orders to display                                              MDM    Final diagnoses:   Urinary tract infection in female       Documentation assistance provided by meka Palacios.  Information recorded by the scribe was done at my direction and has been verified and validated by me.     Amanda Palacios  10/13/20 4087       Cathryn Damon APRN  10/14/20 0234    "

## 2021-05-18 ENCOUNTER — HOSPITAL ENCOUNTER (EMERGENCY)
Facility: HOSPITAL | Age: 46
Discharge: HOME | End: 2021-05-18
Attending: EMERGENCY MEDICINE | Admitting: EMERGENCY MEDICINE

## 2021-05-18 VITALS
BODY MASS INDEX: 29.03 KG/M2 | HEART RATE: 90 BPM | RESPIRATION RATE: 16 BRPM | SYSTOLIC BLOOD PRESSURE: 132 MMHG | OXYGEN SATURATION: 100 % | WEIGHT: 185 LBS | DIASTOLIC BLOOD PRESSURE: 86 MMHG | HEIGHT: 67 IN | TEMPERATURE: 98.3 F

## 2021-05-18 DIAGNOSIS — R10.2 SUPRAPUBIC PAIN, ACUTE: ICD-10-CM

## 2021-05-18 DIAGNOSIS — R82.81 PYURIA: Primary | ICD-10-CM

## 2021-05-18 DIAGNOSIS — R11.0 NAUSEA: ICD-10-CM

## 2021-05-18 DIAGNOSIS — R30.0 DYSURIA: ICD-10-CM

## 2021-05-18 LAB
ALBUMIN SERPL-MCNC: 4.1 G/DL (ref 3.5–5.2)
ALBUMIN/GLOB SERPL: 1.3 G/DL
ALP SERPL-CCNC: 76 U/L (ref 39–117)
ALT SERPL W P-5'-P-CCNC: 20 U/L (ref 1–33)
ANION GAP SERPL CALCULATED.3IONS-SCNC: 11 MMOL/L (ref 5–15)
AST SERPL-CCNC: 17 U/L (ref 1–32)
B-HCG UR QL: NEGATIVE
BACTERIA UR QL AUTO: ABNORMAL /HPF
BASOPHILS # BLD AUTO: 0.04 10*3/MM3 (ref 0–0.2)
BASOPHILS NFR BLD AUTO: 0.4 % (ref 0–1.5)
BILIRUB SERPL-MCNC: 0.6 MG/DL (ref 0–1.2)
BILIRUB UR QL STRIP: NEGATIVE
BUN SERPL-MCNC: 9 MG/DL (ref 6–20)
BUN/CREAT SERPL: 10.5 (ref 7–25)
CALCIUM SPEC-SCNC: 9.2 MG/DL (ref 8.6–10.5)
CHLORIDE SERPL-SCNC: 104 MMOL/L (ref 98–107)
CLARITY UR: CLEAR
CO2 SERPL-SCNC: 23 MMOL/L (ref 22–29)
COLOR UR: YELLOW
CREAT SERPL-MCNC: 0.86 MG/DL (ref 0.57–1)
DEPRECATED RDW RBC AUTO: 43 FL (ref 37–54)
EOSINOPHIL # BLD AUTO: 0.29 10*3/MM3 (ref 0–0.4)
EOSINOPHIL NFR BLD AUTO: 3.1 % (ref 0.3–6.2)
ERYTHROCYTE [DISTWIDTH] IN BLOOD BY AUTOMATED COUNT: 13 % (ref 12.3–15.4)
GFR SERPL CREATININE-BSD FRML MDRD: 72 ML/MIN/1.73
GLOBULIN UR ELPH-MCNC: 3.2 GM/DL
GLUCOSE SERPL-MCNC: 97 MG/DL (ref 65–99)
GLUCOSE UR STRIP-MCNC: NEGATIVE MG/DL
HCT VFR BLD AUTO: 42.3 % (ref 34–46.6)
HGB BLD-MCNC: 13.7 G/DL (ref 12–15.9)
HGB UR QL STRIP.AUTO: NEGATIVE
HYALINE CASTS UR QL AUTO: ABNORMAL /LPF
IMM GRANULOCYTES # BLD AUTO: 0.03 10*3/MM3 (ref 0–0.05)
IMM GRANULOCYTES NFR BLD AUTO: 0.3 % (ref 0–0.5)
KETONES UR QL STRIP: NEGATIVE
LEUKOCYTE ESTERASE UR QL STRIP.AUTO: ABNORMAL
LYMPHOCYTES # BLD AUTO: 1.71 10*3/MM3 (ref 0.7–3.1)
LYMPHOCYTES NFR BLD AUTO: 18.3 % (ref 19.6–45.3)
MCH RBC QN AUTO: 29.3 PG (ref 26.6–33)
MCHC RBC AUTO-ENTMCNC: 32.4 G/DL (ref 31.5–35.7)
MCV RBC AUTO: 90.6 FL (ref 79–97)
MONOCYTES # BLD AUTO: 0.66 10*3/MM3 (ref 0.1–0.9)
MONOCYTES NFR BLD AUTO: 7.1 % (ref 5–12)
NEUTROPHILS NFR BLD AUTO: 6.63 10*3/MM3 (ref 1.7–7)
NEUTROPHILS NFR BLD AUTO: 70.8 % (ref 42.7–76)
NITRITE UR QL STRIP: POSITIVE
NRBC BLD AUTO-RTO: 0 /100 WBC (ref 0–0.2)
PH UR STRIP.AUTO: 6.5 [PH] (ref 5–8)
PLATELET # BLD AUTO: 348 10*3/MM3 (ref 140–450)
PMV BLD AUTO: 11.1 FL (ref 6–12)
POTASSIUM SERPL-SCNC: 4.4 MMOL/L (ref 3.5–5.2)
PROT SERPL-MCNC: 7.3 G/DL (ref 6–8.5)
PROT UR QL STRIP: NEGATIVE
RBC # BLD AUTO: 4.67 10*6/MM3 (ref 3.77–5.28)
RBC # UR: ABNORMAL /HPF
REF LAB TEST METHOD: ABNORMAL
SODIUM SERPL-SCNC: 138 MMOL/L (ref 136–145)
SP GR UR STRIP: <=1.005 (ref 1–1.03)
SQUAMOUS #/AREA URNS HPF: ABNORMAL /HPF
UROBILINOGEN UR QL STRIP: ABNORMAL
WBC # BLD AUTO: 9.36 10*3/MM3 (ref 3.4–10.8)
WBC UR QL AUTO: ABNORMAL /HPF

## 2021-05-18 PROCEDURE — 87086 URINE CULTURE/COLONY COUNT: CPT | Performed by: EMERGENCY MEDICINE

## 2021-05-18 PROCEDURE — 81001 URINALYSIS AUTO W/SCOPE: CPT | Performed by: EMERGENCY MEDICINE

## 2021-05-18 PROCEDURE — 81025 URINE PREGNANCY TEST: CPT | Performed by: EMERGENCY MEDICINE

## 2021-05-18 PROCEDURE — 25010000002 ONDANSETRON PER 1 MG: Performed by: EMERGENCY MEDICINE

## 2021-05-18 PROCEDURE — 25010000002 KETOROLAC TROMETHAMINE PER 15 MG: Performed by: EMERGENCY MEDICINE

## 2021-05-18 PROCEDURE — 96375 TX/PRO/DX INJ NEW DRUG ADDON: CPT

## 2021-05-18 PROCEDURE — 85025 COMPLETE CBC W/AUTO DIFF WBC: CPT | Performed by: EMERGENCY MEDICINE

## 2021-05-18 PROCEDURE — 99283 EMERGENCY DEPT VISIT LOW MDM: CPT

## 2021-05-18 PROCEDURE — 80053 COMPREHEN METABOLIC PANEL: CPT | Performed by: EMERGENCY MEDICINE

## 2021-05-18 PROCEDURE — 96365 THER/PROPH/DIAG IV INF INIT: CPT

## 2021-05-18 PROCEDURE — 25010000002 CEFTRIAXONE PER 250 MG: Performed by: EMERGENCY MEDICINE

## 2021-05-18 RX ORDER — ONDANSETRON 4 MG/1
4 TABLET, FILM COATED ORAL EVERY 6 HOURS PRN
Qty: 8 TABLET | Refills: 0 | Status: SHIPPED | OUTPATIENT
Start: 2021-05-18 | End: 2022-09-21

## 2021-05-18 RX ORDER — ONDANSETRON 4 MG/1
4 TABLET, FILM COATED ORAL EVERY 6 HOURS PRN
Qty: 8 TABLET | Refills: 0 | Status: SHIPPED | OUTPATIENT
Start: 2021-05-18 | End: 2021-05-18 | Stop reason: SDUPTHER

## 2021-05-18 RX ORDER — ONDANSETRON 2 MG/ML
4 INJECTION INTRAMUSCULAR; INTRAVENOUS ONCE
Status: COMPLETED | OUTPATIENT
Start: 2021-05-18 | End: 2021-05-18

## 2021-05-18 RX ORDER — KETOROLAC TROMETHAMINE 15 MG/ML
15 INJECTION, SOLUTION INTRAMUSCULAR; INTRAVENOUS ONCE
Status: COMPLETED | OUTPATIENT
Start: 2021-05-18 | End: 2021-05-18

## 2021-05-18 RX ORDER — CEFDINIR 300 MG/1
300 CAPSULE ORAL 2 TIMES DAILY
Qty: 14 CAPSULE | Refills: 0 | Status: SHIPPED | OUTPATIENT
Start: 2021-05-18 | End: 2021-05-18 | Stop reason: SDUPTHER

## 2021-05-18 RX ORDER — CEFDINIR 300 MG/1
300 CAPSULE ORAL 2 TIMES DAILY
Qty: 14 CAPSULE | Refills: 0 | Status: SHIPPED | OUTPATIENT
Start: 2021-05-18 | End: 2021-05-25

## 2021-05-18 RX ADMIN — ONDANSETRON 4 MG: 2 INJECTION INTRAMUSCULAR; INTRAVENOUS at 11:22

## 2021-05-18 RX ADMIN — KETOROLAC TROMETHAMINE 15 MG: 15 INJECTION, SOLUTION INTRAMUSCULAR; INTRAVENOUS at 11:31

## 2021-05-18 RX ADMIN — SODIUM CHLORIDE 1000 ML: 9 INJECTION, SOLUTION INTRAVENOUS at 11:31

## 2021-05-18 RX ADMIN — SODIUM CHLORIDE 1 G: 900 INJECTION INTRAVENOUS at 11:31

## 2021-05-19 LAB — BACTERIA SPEC AEROBE CULT: NORMAL

## 2021-06-29 ENCOUNTER — TRANSCRIBE ORDERS (OUTPATIENT)
Dept: ADMINISTRATIVE | Facility: HOSPITAL | Age: 46
End: 2021-06-29

## 2021-06-29 DIAGNOSIS — Z12.31 VISIT FOR SCREENING MAMMOGRAM: Primary | ICD-10-CM

## 2021-07-08 ENCOUNTER — HOSPITAL ENCOUNTER (OUTPATIENT)
Dept: MAMMOGRAPHY | Facility: HOSPITAL | Age: 46
Discharge: HOME | End: 2021-07-08
Admitting: FAMILY MEDICINE

## 2021-07-08 DIAGNOSIS — Z12.31 VISIT FOR SCREENING MAMMOGRAM: ICD-10-CM

## 2021-07-08 PROCEDURE — 77063 BREAST TOMOSYNTHESIS BI: CPT

## 2021-07-08 PROCEDURE — 77067 SCR MAMMO BI INCL CAD: CPT

## 2021-07-08 PROCEDURE — 77063 BREAST TOMOSYNTHESIS BI: CPT | Performed by: RADIOLOGY

## 2021-07-08 PROCEDURE — 77067 SCR MAMMO BI INCL CAD: CPT | Performed by: RADIOLOGY

## 2021-08-03 ENCOUNTER — HOSPITAL ENCOUNTER (OUTPATIENT)
Dept: MAMMOGRAPHY | Facility: HOSPITAL | Age: 46
Discharge: HOME | End: 2021-08-03

## 2021-08-03 ENCOUNTER — TRANSCRIBE ORDERS (OUTPATIENT)
Dept: MAMMOGRAPHY | Facility: HOSPITAL | Age: 46
End: 2021-08-03

## 2021-08-03 ENCOUNTER — HOSPITAL ENCOUNTER (OUTPATIENT)
Dept: ULTRASOUND IMAGING | Facility: HOSPITAL | Age: 46
Discharge: HOME | End: 2021-08-03

## 2021-08-03 DIAGNOSIS — R92.8 ABNORMAL MAMMOGRAM: Primary | ICD-10-CM

## 2021-08-03 DIAGNOSIS — R92.8 ABNORMAL MAMMOGRAM: ICD-10-CM

## 2021-08-03 PROCEDURE — G0279 TOMOSYNTHESIS, MAMMO: HCPCS

## 2021-08-03 PROCEDURE — 76642 ULTRASOUND BREAST LIMITED: CPT | Performed by: RADIOLOGY

## 2021-08-03 PROCEDURE — 77066 DX MAMMO INCL CAD BI: CPT | Performed by: RADIOLOGY

## 2021-08-03 PROCEDURE — 77062 BREAST TOMOSYNTHESIS BI: CPT | Performed by: RADIOLOGY

## 2021-08-03 PROCEDURE — 76642 ULTRASOUND BREAST LIMITED: CPT

## 2021-08-03 PROCEDURE — 77066 DX MAMMO INCL CAD BI: CPT

## 2021-09-09 ENCOUNTER — TRANSCRIBE ORDERS (OUTPATIENT)
Dept: PHYSICAL THERAPY | Facility: CLINIC | Age: 46
End: 2021-09-09

## 2021-09-09 DIAGNOSIS — N94.10 FEMALE DYSPAREUNIA: Primary | ICD-10-CM

## 2021-09-13 ENCOUNTER — TREATMENT (OUTPATIENT)
Dept: PHYSICAL THERAPY | Facility: CLINIC | Age: 46
End: 2021-09-13

## 2021-09-13 DIAGNOSIS — N94.10 DYSPAREUNIA, FEMALE: Primary | ICD-10-CM

## 2021-09-13 DIAGNOSIS — R10.2 PELVIC PAIN: ICD-10-CM

## 2021-09-13 DIAGNOSIS — M62.838 MUSCLE SPASM: ICD-10-CM

## 2021-09-13 PROCEDURE — 97162 PT EVAL MOD COMPLEX 30 MIN: CPT | Performed by: PHYSICAL THERAPIST

## 2021-09-13 NOTE — PROGRESS NOTES
Physical Therapy Initial Evaluation and Plan of Care    Patient: Halle De Jesus   : 1975  Diagnosis/ICD-10 Code:  Dyspareunia, female [N94.10]  Referring practitioner: JOSE RAFAEL Saldana  Date of Initial Visit: 2021  Today's Date: 2021  Patient seen for 1 sessions      Subjective      Has a UTI today. It had been 5 months since infection but prior had been having infections for last 1.5 years. Constant and seeing infectious disease and it didn't respond to antibiotics. Urosepsis and kidney infections. History of L ovary removed in 2017. After that felt like always something pulling on her urethra and has continued to worsen. Sex excruciatingly painful. Thought divertucula in urethra but not confirmed with MRI.     Chief Complaint:   Chief Complaint   Patient presents with   • Initial Evaluation      Functional Outcome Measure: VQ: 16    Pain  Some minor low back pain/tightness  Average: 5/10 daily  Aggs: stress, UTI    Bladder function:    Incontinence: no  Leak at night: no  Urination at night: not recently, sometimes can be 5x and voids good amount  Use bathroom “just in case”: yes  Strong urinary urge: no  Leaking with urge: no  Urge triggers: no  Complete bladder voiding: tests show fully emptying  Urinary splaying: no  Post-micturition dribble: no  Frequency of urination: 2-3 times in 12 hours during day  Discomfort with urination: can always have burning, but feels like peeing glass when UTI  Fluid consumed daily: tea - 8-10 cups, water - probably 3 cups      Bowel function:  Sometimes constipated; takes probiotic daily - if doesn't has a lot of diarrhea  How many episodes of leakage/month: no  How many BM's/day: once per day  Josephine Stool Scale Type: 1-6 always varied  Discomfort with BM: 4-5/10 - abdominal cramping  Complete bowel voiding: doesn't feel emptying  Assistance to pass stool: normal for her to push/strain    Sexual activity  Sexually active: didn't have  this issue prior to surgery, after surgery certain positions uncomfortable but since active infections it is much worse;   Initial and deeper penetration; last attempt yesterday - prophylactic antibiotic but didn't take it last night  Pain with penetration: Pain: 7 always at least 7/10. 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    • Seasonal allergies    • Sepsis (CMS/HCC)    • Urinary tract infection         Surgical HX:   Past Surgical History:   Procedure Laterality Date   • DIAGNOSTIC LAPAROSCOPY EXPLORATORY LAPAROTOMY N/A 2016    Procedure: DIAGNOSTIC LAPAROSCOPY EXPLORATORY LAPAROTOMY;  Surgeon: Katherine Hernandez MD;  Location:  [facility];  Service:    • MOUTH SURGERY     • OVARIAN CYST DRAINAGE/EXCISION N/A 2016    Procedure: DIAGNOSTIC LAPAROSCOPY, OVARIAN CYSTECTOMY POSSIBLE OOPHORECTOMY, POSSIBLE LASER;  Surgeon: Katherine Hernandez MD;  Location:  [facility];  Service:         Menstrual cycle: regular like clockwork always had heavy bleeding and pain    Birth HX:     Meds:   Current Outpatient Medications:   •  busPIRone (BUSPAR) 7.5 MG tablet, TK 1 T PO QPM FOR 1 WEEK THEN INCREASE TO 1 T BID, Disp: , Rfl:   •  cetirizine (ZyrTEC) 10 MG tablet, Take 10 mg by mouth daily., Disp: , Rfl:   •  nitrofurantoin, macrocrystal-monohydrate, (MACROBID) 100 MG capsule, Take 1 capsule by mouth 2 (Two) Times a Day., Disp: 20 capsule, Rfl: 0  •  ondansetron (ZOFRAN) 4 MG tablet, Take 1 tablet by mouth Every 6 (Six) Hours As Needed for Nausea or Vomiting., Disp: 8 tablet, Rfl: 0  •  phenazopyridine (Pyridium) 200 MG tablet, Take 1 tablet by mouth 3 (Three) Times a Day As Needed for Bladder Spasms., Disp: 6 tablet, Rfl: 0  •  Probiotic Product (PROBIOTIC DAILY PO), Take 1 capsule by mouth., Disp: , Rfl:   •   promethazine (PHENERGAN) 12.5 MG tablet, Take 1 tablet by mouth Every 6 (Six) Hours As Needed for Nausea or Vomiting., Disp: 12 tablet, Rfl: 0  •  SYMBICORT 80-4.5 MCG/ACT inhaler, INHALE 2 PUFFS BID FOR ASTHMA, Disp: , Rfl: 3  •  VENTOLIN  (90 BASE) MCG/ACT inhaler, INHALE 1 OR 2 PUFFS Q 6 H PRN, Disp: , Rfl: 3     Occupation:     Activity level/exercise routine: 20 min walk daily            Objective      verbal consent obtained for external pelvic exam/treatment with declined need for second person in room    Palpation:   Supine:   Abdominals, Iliacus, psoas- moderate tension B 3-4/10 TTP throughout; 5/10 TTP L iliacus near TrA  Adductors mild tension proximally R>L  Mild bladder restrictions L>R    External:    Ischiocavernosus - mild tension B   Supf and deep transverse perineal mm - mild tension B   Sensation intact B  No internal exam due to active infection.         Assessment/Plan:     The patient is a 46 y.o. female who presents to physical therapy today for pelvic pain, pain with intercourse, pain with urination. Upon initial evaluation, the patient demonstrates the following impairments: moderate tension of abdominal and hip muscles with bladder restrictions and tension of external pelvic floor. No internal exam today due to active infection. Due to these impairments, the patient is unable to have intercourse or urinate without pain. The patient would benefit from skilled pelvic PT services to address functional limitations and impairments and to improve patient quality of life.      Goals:   STG's: 4 weeks  · Patient will report > 25% reduction in overall pain   · Patient will report > 25% improvement in urinary symptoms  · Patient will be able to perform HEP with minimal verbal cues    LTG's: By discharge  · Patient will report a decrease in pain to < 1/10 with urination  · Patient will report >75% improvement in urinary symptoms   · Patient will report an elimination of urinary urgency    · Patient will be able to tolerate an internal pelvic exam/vaginal penetration with pain <1/10   · Patient will be independent with HEP      Plan  Therapy options: will be seen for skilled physical therapy services  Planned modality interventions: TENS, ultrasound, cryotherapy, thermotherapy (hydrocollator packs) and high voltage pulsed current (pain management)  Planned therapy interventions: abdominal trunk stabilization, manual therapy, neuromuscular re-education, body mechanics training, flexibility, functional ROM exercises, gait training, home exercise program, joint mobilization, therapeutic activities, stretching, strengthening, spinal/joint mobilization, soft tissue mobilization and postural training  Pt prognosis: good  Frequency: weekly  Duration in visits: 12  Duration in weeks: 12  Treatment plan discussed with: patient    1315/1355  Timed:         Manual Therapy:    0     mins  36427;     Therapeutic Exercise:    0     mins  39764;     Neuromuscular Kathryn:    0    mins  98867;    Therapeutic Activity:     0     mins  95107;     Gait Trainin     mins  30933;     Ultrasound:     0     mins  63277;    Ionto                               0    mins   39349  Self Care                       0     mins   67672  Canalith Repos    0     mins 86782      Un-Timed:  Electrical Stimulation:    0     mins  53043 ( );  Dry Needling     0     mins self-pay  Traction     0     mins 92797  Low Eval     0     Mins  47734  Mod Eval     40     Mins  53071  High Eval                       0     Mins  19105  Re-Eval                           0    mins  98681        Timed Treatment:   0   mins   Total Treatment:     40   mins    PT SIGNATURE:Surinder Vines PT, DPT  DATE TREATMENT INITIATED: 2021    Initial Certification  Certification Period: 2021  I certify that the therapy services are furnished while this patient is under my care.  The services outlined above are required by this patient, and  will be reviewed every 90 days.     PHYSICIAN: Angelica Grove, APRN      DATE: 09/13/2021     Please sign and return via fax to [phone]. Thank you, [facility].

## 2021-10-04 ENCOUNTER — TREATMENT (OUTPATIENT)
Dept: PHYSICAL THERAPY | Facility: CLINIC | Age: 46
End: 2021-10-04

## 2021-10-04 DIAGNOSIS — N94.10 DYSPAREUNIA, FEMALE: Primary | ICD-10-CM

## 2021-10-04 DIAGNOSIS — M62.838 MUSCLE SPASM: ICD-10-CM

## 2021-10-04 DIAGNOSIS — R10.2 PELVIC PAIN: ICD-10-CM

## 2021-10-04 PROCEDURE — 97110 THERAPEUTIC EXERCISES: CPT | Performed by: PHYSICAL THERAPIST

## 2021-10-04 PROCEDURE — 97140 MANUAL THERAPY 1/> REGIONS: CPT | Performed by: PHYSICAL THERAPIST

## 2021-10-04 NOTE — PROGRESS NOTES
Physical Therapy Daily Progress Note  Patient: Halle De Jesus   : 1975  Diagnosis/ICD-10 Code:  Dyspareunia, female [N94.10]  Referring practitioner: JOSE RAFAEL Saldana  Date of Initial Visit: Type: THERAPY  Noted: 2021  Today's Date: 10/5/2021  Patient seen for 2 sessions      Subjective   Halle De Jesus reports recovered from UTI, looks completely clear. On period today. She reports abdominal bloating and it always seems like her bladder.     Pain Rating (0-10): 1      Objective   verbal consent obtained for external pelvic exam/treatment with declined need for second person in room     See Exercise, Manual, and Modality Logs for complete treatment.       Assessment/Plan  Pt tolerated manual with moderate discomfort that decreased with MFR and TPR. She is compliant with HEP and was instructed in advanced stretching today for hip and abdominal mm. She will continue to benefit from skilled pelvic PT to address pain. Will implement internal as tolerated.     Progress per Plan of Care         1445/1530   Timed:         Manual Therapy:    33     mins  43085;     Therapeutic Exercise:    8     mins  17608;     Neuromuscular Kathryn:    0    mins  03576;    Therapeutic Activity:     4     mins  85059;     Gait Trainin     mins  17283;     Ultrasound:     0     mins  28092;    Ionto                               0    mins   29691  Self Care                       0     mins   14457  Canalith Repos               0    mins  45863    Un-Timed:  Electrical Stimulation:    0     mins  77469 ( );  Dry Needling       0   mins self-pay  Traction     0     mins 39357  Low Eval     0     Mins  52181  Mod Eval     0     Mins  74011  High Eval                       0     Mins  87731  Re-Eval                           0    mins  26095    Timed Treatment:   45   mins   Total Treatment:     45   mins    Surinder Vines PT  [place] License # [id number]  Physical Therapist

## 2021-10-15 ENCOUNTER — TREATMENT (OUTPATIENT)
Dept: PHYSICAL THERAPY | Facility: CLINIC | Age: 46
End: 2021-10-15

## 2021-10-15 DIAGNOSIS — N94.10 DYSPAREUNIA, FEMALE: Primary | ICD-10-CM

## 2021-10-15 DIAGNOSIS — R10.2 PELVIC PAIN: ICD-10-CM

## 2021-10-15 DIAGNOSIS — M62.838 MUSCLE SPASM: ICD-10-CM

## 2021-10-15 PROCEDURE — 97110 THERAPEUTIC EXERCISES: CPT | Performed by: PHYSICAL THERAPIST

## 2021-10-15 PROCEDURE — 97140 MANUAL THERAPY 1/> REGIONS: CPT | Performed by: PHYSICAL THERAPIST

## 2021-10-15 NOTE — PROGRESS NOTES
Re-Assessment / Re-Certification      Patient: Halle De Jesus   : 1975  Diagnosis/ICD-10 Code:  Dyspareunia, female [N94.10]  Referring practitioner: JOSE RAFAEL Saldana  Date of Initial Visit: Type: THERAPY  Noted: 2021  Today's Date: 10/17/2021  Patient seen for 3 sessions      Subjective:   Halle De Jesus reports: super sore after last treatment. Lasted 2-3 days. Then felt better for about until bladder spasms started on Tuesday afternoon and thinks that was related to stress.       Clinical Progress: improved  Home Program Compliance: Yes  Treatment has included: therapeutic exercise, neuromuscular re-education, manual therapy, therapeutic activity and moist heat    Objective     verbal consent obtained for external pelvic exam/treatment with declined need for second person in room     Palpation:   Supine:   Abdominals, Iliacus, psoas- moderate tension B 3-4/10 TTP throughout; 5/10 TTP L iliacus near TrA  Adductors mild tension proximally R>L  Mild bladder restrictions L>R     External:               Ischiocavernosus - mild tension B              Supf and deep transverse perineal mm - mild tension B              Sensation intact B  No internal exam due to active infection.      Assessment/Plan   Pt has been seen once since evaluation. She has met 1/3 short term goals. Pt tolerated manual with moderate discomfort that decreased with MFR and TPR. She is compliant with HEP and was instructed in advanced stretching today for hip and abdominal mm. She will continue to benefit from skilled pelvic PT to address pain. Will implement internal as tolerated next visit.    Progress toward previous goals: Partially Met    STG's: 4 weeks  · Patient will report > 25% reduction in overall pain   · Patient will report > 25% improvement in urinary symptoms  · Patient will be able to perform HEP with minimal verbal cues - met     LTG's: By discharge  · Patient will report a decrease in  pain to < 1/10 with urination  · Patient will report >75% improvement in urinary symptoms   · Patient will report an elimination of urinary urgency   · Patient will be able to tolerate an internal pelvic exam/vaginal penetration with pain <1/10   · Patient will be independent with HEP        Recommendations: Continue as planned  Timeframe: 3 months  Prognosis to achieve goals: good    PT Signature: Surinder Vines, GÓMEZ      Based upon review of the patient's progress and continued therapy plan, it is my medical opinion that Halle De Jesus should continue physical therapy treatment at [facility]  [address] [place] [id number] [phone].    Signature: __________________________________  Angelica Grove, APRN      Manual Therapy:    37     mins  50650;  Therapeutic Exercise:    8     mins  65135;     Neuromuscular Kathryn:    0    mins  13040;    Therapeutic Activity:     0     mins  76665;     Gait Trainin     mins  90196;     Ultrasound:     0     mins  71897;    Electrical Stimulation:    0     mins  25115 ( );  Dry Needling     0     mins self-pay    Timed Treatment:   45   mins   Total Treatment:     45   mins

## 2021-10-22 ENCOUNTER — TREATMENT (OUTPATIENT)
Dept: PHYSICAL THERAPY | Facility: CLINIC | Age: 46
End: 2021-10-22

## 2021-10-22 DIAGNOSIS — N94.10 DYSPAREUNIA, FEMALE: Primary | ICD-10-CM

## 2021-10-22 DIAGNOSIS — R10.2 PELVIC PAIN: ICD-10-CM

## 2021-10-22 DIAGNOSIS — M62.838 MUSCLE SPASM: ICD-10-CM

## 2021-10-22 PROCEDURE — 97530 THERAPEUTIC ACTIVITIES: CPT | Performed by: PHYSICAL THERAPIST

## 2021-10-22 PROCEDURE — 97140 MANUAL THERAPY 1/> REGIONS: CPT | Performed by: PHYSICAL THERAPIST

## 2021-10-22 NOTE — PROGRESS NOTES
Physical Therapy Daily Progress Note  Patient: Halle De Jesus   : 1975  Diagnosis/ICD-10 Code:  Dyspareunia, female [N94.10]  Referring practitioner: JOSE RAFAEL Saldana  Date of Initial Visit: Type: THERAPY  Noted: 2021  Today's Date: 10/25/2021  Patient seen for 4 sessions      Subjective   Halle De Jesus reports not as sore after last treatment but not as much relief either. Wants to resume regular pressure. More stress at end of week and tends to have more pain. More bladder spasms the last 2 weeks that have lessened the last 2 days. Had to take more medicaction.   Pain Rating (0-10): 4      Objective   verbal consent obtained for external pelvic exam/treatment with declined need for second person in room     See Exercise, Manual, and Modality Logs for complete treatment.       Assessment/Plan  Pt with decreased improvement in symptoms with decreased pressure at last treatment. Tolerated increased pressure today with moderate discomfort. Pt compliant with HEP and likely progress to internal treatment next visit to further decrease pelvic pain.     Progress per Plan of Care         1430/1515   Timed:         Manual Therapy:    37     mins  08228;     Therapeutic Exercise:    0     mins  67205;     Neuromuscular Kathryn:    0    mins  07585;    Therapeutic Activity:     8     mins  83100;     Gait Trainin     mins  85652;     Ultrasound:     0     mins  87035;    Ionto                               0    mins   40145  Self Care                       0     mins   56697  Canalith Repos               0    mins  97583    Un-Timed:  Electrical Stimulation:    0     mins  98943 (MC );  Dry Needling     0     mins self-pay  Traction     0     mins 31286  Low Eval     0     Mins  39408  Mod Eval     0     Mins  50683  High Eval                       0     Mins  42001  Re-Eval                           0    mins  14715    Timed Treatment:   45   mins   Total Treatment:      45   mins    Surinder Vines, PT  [place] License # [id number]  Physical Therapist

## 2021-10-29 ENCOUNTER — TREATMENT (OUTPATIENT)
Dept: PHYSICAL THERAPY | Facility: CLINIC | Age: 46
End: 2021-10-29

## 2021-10-29 DIAGNOSIS — N94.10 DYSPAREUNIA, FEMALE: Primary | ICD-10-CM

## 2021-10-29 DIAGNOSIS — M62.838 MUSCLE SPASM: ICD-10-CM

## 2021-10-29 DIAGNOSIS — R10.2 PELVIC PAIN: ICD-10-CM

## 2021-10-29 PROCEDURE — 97140 MANUAL THERAPY 1/> REGIONS: CPT | Performed by: PHYSICAL THERAPIST

## 2021-11-05 ENCOUNTER — TREATMENT (OUTPATIENT)
Dept: PHYSICAL THERAPY | Facility: CLINIC | Age: 46
End: 2021-11-05

## 2021-11-05 DIAGNOSIS — M62.838 MUSCLE SPASM: ICD-10-CM

## 2021-11-05 DIAGNOSIS — N94.10 DYSPAREUNIA, FEMALE: Primary | ICD-10-CM

## 2021-11-05 DIAGNOSIS — R10.2 PELVIC PAIN: ICD-10-CM

## 2021-11-05 PROCEDURE — 97140 MANUAL THERAPY 1/> REGIONS: CPT | Performed by: PHYSICAL THERAPIST

## 2021-11-05 PROCEDURE — 97530 THERAPEUTIC ACTIVITIES: CPT | Performed by: PHYSICAL THERAPIST

## 2021-11-05 NOTE — PROGRESS NOTES
Physical Therapy Daily Progress Note  Patient: Halle De Jesus   : 1975  Diagnosis/ICD-10 Code:  Dyspareunia, female [N94.10]  Referring practitioner: JOSE RAFAEL Saldana  Date of Initial Visit: Type: THERAPY  Noted: 2021  Today's Date: 2021  Patient seen for 6 sessions      Subjective   Halle De Jesus reports a lot of pressure this week. Does great few days following treatment and then by Wednesday starts to get worse.   Pain Rating (0-10): 4      Objective   verbal consent obtained for internal pelvic exam/treatment with declined need for second person in room     See Exercise, Manual, and Modality Logs for complete treatment.     Assessment/Plan  Pt with significant improvement after last visit, lasting several days. Implemented internal treatment today and will monitor response. She tolerated with moderate discomfort that decreased with MFR and TPR. Will continue manual and progress as tolerated to decrease pain.     Progress per Plan of Care          0945/1030   Timed:         Manual Therapy:    37     mins  58733;     Therapeutic Exercise:    0     mins  41613;     Neuromuscular Kathryn:    0    mins  75818;    Therapeutic Activity:     8     mins  27008;     Gait Trainin     mins  78788;     Ultrasound:     0     mins  27977;    Ionto                               0    mins   79892  Self Care                       0     mins   12256  Canalith Repos               0    mins  28379    Un-Timed:  Electrical Stimulation:    0     mins  45006 ( );  Dry Needling     00     mins self-pay  Traction     0     mins 78975  Low Eval     0     Mins  51481  Mod Eval     0     Mins  01896  High Eval                       0     Mins  73562  Re-Eval                           0    mins  72078    Timed Treatment:   45   mins   Total Treatment:     45   mins    Surinder Vines PT  [place] License # [id number]  Physical Therapist

## 2021-11-19 ENCOUNTER — TREATMENT (OUTPATIENT)
Dept: PHYSICAL THERAPY | Facility: CLINIC | Age: 46
End: 2021-11-19

## 2021-11-19 DIAGNOSIS — R10.2 PELVIC PAIN: ICD-10-CM

## 2021-11-19 DIAGNOSIS — N94.10 DYSPAREUNIA, FEMALE: Primary | ICD-10-CM

## 2021-11-19 DIAGNOSIS — M62.838 MUSCLE SPASM: ICD-10-CM

## 2021-11-19 PROCEDURE — 97140 MANUAL THERAPY 1/> REGIONS: CPT | Performed by: PHYSICAL THERAPIST

## 2021-11-19 NOTE — PROGRESS NOTES
Re-Assessment / Re-Certification        Patient: Halle De Jesus   : 1975  Diagnosis/ICD-10 Code:  Dyspareunia, female [N94.10]  Referring practitioner: JOSE RAFAEL Saldana  Date of Initial Visit: Type: THERAPY  Noted: 2021  Today's Date: 2021  Patient seen for 7 sessions      Subjective:   Halle De Jesus reports: lots of pressure in low back and abdomen with travel but also started period earlier. Rates improvement in pain 20-30%. Pain is less frequent. Feels bladder about the same. Requests no internal due to period.      Subjective Questionnaire: VQ  Clinical Progress: improved  Home Program Compliance: Yes  Treatment has included: therapeutic exercise, neuromuscular re-education, manual therapy, therapeutic activity and moist heat      Objective   verbal consent obtained for external pelvic exam/treatment with declined need for second person in room  Supine:   Abdominals, Iliacus, psoas- mild-moderate tension;   Adductors mild tension proximally R>L  Mild bladder restrictions L>R    See flowsheet for details of treatment following reassessment.     Assessment/Plan   Pt having improved pain symptoms and has met 2/3 short term goals. She demonstrates decreasing tension of pelvic girdle muscles. No internal treatment today due to menstruation. Will continue manual and resume internal next visit.   Progress toward previous goals: Partially Met    STG's: 4 weeks  · Patient will report > 25% reduction in overall pain - met  · Patient will report > 25% improvement in urinary symptoms  · Patient will be able to perform HEP with minimal verbal cues - met     LTG's: By discharge  · Patient will report a decrease in pain to < 1/10 with urination  · Patient will report >75% improvement in urinary symptoms   · Patient will report an elimination of urinary urgency   · Patient will be able to tolerate an internal pelvic exam/vaginal penetration with pain <1/10   · Patient will be  independent with HEP        Recommendations: Continue as planned  Timeframe: 2 months  Prognosis to achieve goals: good    PT Signature: Surinder Vines PT      Based upon review of the patient's progress and continued therapy plan, it is my medical opinion that Halle De Jesus should continue physical therapy treatment at [facility]  [address] [place] [id number] [phone].    Signature: __________________________________  Angelica Grove, APRN    1208/1250  Manual Therapy:    38     mins  56710;  Therapeutic Exercise:    0     mins  62600;     Neuromuscular Kathryn:    0    mins  15135;    Therapeutic Activity:     4     mins  18806;     Gait Trainin     mins  51647;     Ultrasound:     0     mins  24160;    Electrical Stimulation:    0     mins  47368 ( );  Dry Needling     0     mins self-pay    Timed Treatment:   42   mins   Total Treatment:     42   mins

## 2021-12-03 ENCOUNTER — TREATMENT (OUTPATIENT)
Dept: PHYSICAL THERAPY | Facility: CLINIC | Age: 46
End: 2021-12-03

## 2021-12-03 DIAGNOSIS — R10.2 PELVIC PAIN: ICD-10-CM

## 2021-12-03 DIAGNOSIS — N94.10 DYSPAREUNIA, FEMALE: Primary | ICD-10-CM

## 2021-12-03 DIAGNOSIS — M62.838 MUSCLE SPASM: ICD-10-CM

## 2021-12-03 PROCEDURE — 97140 MANUAL THERAPY 1/> REGIONS: CPT | Performed by: PHYSICAL THERAPIST

## 2021-12-03 PROCEDURE — 97530 THERAPEUTIC ACTIVITIES: CPT | Performed by: PHYSICAL THERAPIST

## 2021-12-03 NOTE — PROGRESS NOTES
Physical Therapy Daily Progress Note  Patient: Halle De Jesus   : 1975  Diagnosis/ICD-10 Code:  Dyspareunia, female [N94.10]  Referring practitioner: JOSE RAFAEL Saldana  Date of Initial Visit: Type: THERAPY  Noted: 2021  Today's Date: 2021  Patient seen for 8 sessions      Subjective   Halle De Jesus reports some increased back pain after last visit about an hour. Pain not too good this week because reacting to stress and crazy few weeks.     Pain Rating (0-10): 6      Objective   verbal consent obtained for external pelvic exam/treatment with declined need for second person in room     See Exercise, Manual, and Modality Logs for complete treatment.       Assessment/Plan   Pt with increased hip and pelvic pain she attributes to stress with work. She tolerated manual today with moderate discomfort to pelvic girdle mm. She had decreased pain with TPR. Will start internal next visit to decrease PFM tension.     Progress per Plan of Care         1430/1515   Timed:         Manual Therapy:    37     mins  49067;     Therapeutic Exercise:    0     mins  53716;     Neuromuscular Kathryn:    0    mins  82185;    Therapeutic Activity:     8     mins  28687;     Gait Trainin     mins  94120;     Ultrasound:     0     mins  01933;    Ionto                               0    mins   24033  Self Care                       0     mins   37537  Canalith Repos               0    mins  37508    Un-Timed:  Electrical Stimulation:    0     mins  19823 ( );  Dry Needling     0     mins self-pay  Traction     0     mins 20922  Low Eval     0     Mins  56503  Mod Eval     0     Mins  30523  High Eval                       0     Mins  03449  Re-Eval                           0    mins  07349    Timed Treatment:   45   mins   Total Treatment:     45   mins    Surinder Vines PT  [place] License # [id number]  Physical Therapist

## 2021-12-10 ENCOUNTER — TREATMENT (OUTPATIENT)
Dept: PHYSICAL THERAPY | Facility: CLINIC | Age: 46
End: 2021-12-10

## 2021-12-10 DIAGNOSIS — M62.838 MUSCLE SPASM: ICD-10-CM

## 2021-12-10 DIAGNOSIS — R10.2 PELVIC PAIN: ICD-10-CM

## 2021-12-10 DIAGNOSIS — N94.10 DYSPAREUNIA, FEMALE: Primary | ICD-10-CM

## 2021-12-10 PROCEDURE — 97530 THERAPEUTIC ACTIVITIES: CPT | Performed by: PHYSICAL THERAPIST

## 2021-12-10 PROCEDURE — 97140 MANUAL THERAPY 1/> REGIONS: CPT | Performed by: PHYSICAL THERAPIST

## 2021-12-10 NOTE — PROGRESS NOTES
Physical Therapy Daily Progress Note  Patient: Halle De Jesus   : 1975  Diagnosis/ICD-10 Code:  Dyspareunia, female [N94.10]  Referring practitioner: JOSE RAFAEL Saldana  Date of Initial Visit: Type: THERAPY  Noted: 2021  Today's Date: 2021  Patient seen for 9 sessions      Subjective   Halle De Jesus reports 7/10 pain in back and bladder. Increased stress UTI like symptoms but took antibiotics and had clear tests.   Pain Rating (0-10): 4      Objective   verbal consent obtained for internal pelvic exam/treatment with declined need for second person in room     See Exercise, Manual, and Modality Logs for complete treatment.         Assessment/Plan  Pt with increased pain with stress of caring for  after surgery this week. She tolerated manual with mod discomfort with addition of internal treatment. She is to continue current HEP at this time.     Progress per Plan of Care         1305/1350   Timed:         Manual Therapy:    37     mins  16439;     Therapeutic Exercise:    0     mins  94753;     Neuromuscular Kathryn:    0    mins  19242;    Therapeutic Activity:     8     mins  59950;     Gait Trainin     mins  35896;     Ultrasound:     0     mins  74271;    Ionto                               0    mins   84321  Self Care                       0     mins   58139  Canalith Repos               0    mins  24124    Un-Timed:  Electrical Stimulation:    0     mins  79208 ( );  Dry Needling     0     mins self-pay  Traction     0     mins 76573  Low Eval     0     Mins  67261  Mod Eval     0     Mins  18477  High Eval                       0     Mins  90476  Re-Eval                           0    mins  62576    Timed Treatment:   45   mins   Total Treatment:     45   mins    Surinder Vines PT  [place] License # [id number]  Physical Therapist

## 2021-12-17 ENCOUNTER — TREATMENT (OUTPATIENT)
Dept: PHYSICAL THERAPY | Facility: CLINIC | Age: 46
End: 2021-12-17

## 2021-12-17 DIAGNOSIS — M62.838 MUSCLE SPASM: ICD-10-CM

## 2021-12-17 DIAGNOSIS — N94.10 DYSPAREUNIA, FEMALE: Primary | ICD-10-CM

## 2021-12-17 DIAGNOSIS — R10.2 PELVIC PAIN: ICD-10-CM

## 2021-12-17 PROCEDURE — 97140 MANUAL THERAPY 1/> REGIONS: CPT | Performed by: PHYSICAL THERAPIST

## 2021-12-17 PROCEDURE — 97530 THERAPEUTIC ACTIVITIES: CPT | Performed by: PHYSICAL THERAPIST

## 2021-12-17 NOTE — PROGRESS NOTES
Physical Therapy Daily Progress Note  Patient: Halle De Jesus   : 1975  Diagnosis/ICD-10 Code:  Dyspareunia, female [N94.10]  Referring practitioner: JOSE RAFAEL Saldana  Date of Initial Visit: Type: THERAPY  Noted: 2021  Today's Date: 2021  Patient seen for 10 sessions      Subjective   Halle De Jesus reports bladder spasms not having to use medication in last month. Pain in low back and tailbone but also lifting . Still some burning. Started period.   Pain Rating (0-10): 5      Objective   verbal consent obtained for external pelvic exam/treatment with declined need for second person in room     See Exercise, Manual, and Modality Logs for complete treatment.       Assessment/Plan  No internal today due to menstrual cycle. Tolerated with moderate discomfort. Will resume internal next visit. Pt to continue current HEP at this time.     Progress per Plan of Care         1300/1345   Timed:         Manual Therapy:    37     mins  38057;     Therapeutic Exercise:    0     mins  20706;     Neuromuscular Kathryn:        mins  51691;    Therapeutic Activity:     8     mins  39660;     Gait Trainin     mins  23854;     Ultrasound:     0     mins  83352;    Ionto                               0    mins   24985  Self Care                       0     mins   07320  Canalith Repos               0000    mins  16927    Un-Timed:  Electrical Stimulation:    0     mins  31157 ( );  Dry Needling     00     mins self-pay  Traction     0     mins 09832  Low Eval     0     Mins  52048  Mod Eval     0     Mins  96946  High Eval                       0     Mins  66071  Re-Eval                           0    mins  30451    Timed Treatment:   45   mins   Total Treatment:     45   mins    GÓMEZ Renee License # [id number]  Physical Therapist

## 2022-01-07 ENCOUNTER — TREATMENT (OUTPATIENT)
Dept: PHYSICAL THERAPY | Facility: CLINIC | Age: 47
End: 2022-01-07

## 2022-01-07 DIAGNOSIS — R10.2 PELVIC PAIN: ICD-10-CM

## 2022-01-07 DIAGNOSIS — M62.838 MUSCLE SPASM: ICD-10-CM

## 2022-01-07 DIAGNOSIS — N94.10 DYSPAREUNIA, FEMALE: Primary | ICD-10-CM

## 2022-01-07 PROCEDURE — 97530 THERAPEUTIC ACTIVITIES: CPT | Performed by: PHYSICAL THERAPIST

## 2022-01-07 PROCEDURE — 97140 MANUAL THERAPY 1/> REGIONS: CPT | Performed by: PHYSICAL THERAPIST

## 2022-01-07 NOTE — PROGRESS NOTES
Re-Assessment / Re-Certification        Patient: Halle De Jesus   : 1975  Diagnosis/ICD-10 Code:  Dyspareunia, female [N94.10]  Referring practitioner: JOSE RAFAEL Saldana  Date of Initial Visit: Type: THERAPY  Noted: 2021  Today's Date: 2022  Patient seen for 11 sessions      Subjective:   Halle De Jesus reports: not having bladder spasms and not using oxybutinine. Low back pain having to use heating pad every night. /10. Has to take medication for it. 75% improvement with bladder. 1 mild infection but nothing like before.     Subjective Questionnaire: VQ  Clinical Progress: improved  Home Program Compliance: Yes  Treatment has included: therapeutic exercise, neuromuscular re-education, manual therapy, therapeutic activity and moist heat        Objective   verbal consent obtained for internal pelvic exam/treatment with declined need for second person in room  Supine:   Lumbar paraspinals, QL, piriformis, glutes -moderate tension B   Internal: Moderate tension of internal superficial pelvic floor muscles, levator ani bilaterally     See flowsheet for details of treatment following reassessment.      Assessment/Plan   Pt having improved pain symptoms and has met 2/3 short term goals. She demonstrates decreasing tension of pelvic girdle muscles. No internal treatment today due to menstruation. Will continue manual and resume internal next visit.   Progress toward previous goals: Partially Met     STG's: 4 weeks  · Patient will report > 25% reduction in overall pain - met  · Patient will report > 25% improvement in urinary symptoms - met  · Patient will be able to perform HEP with minimal verbal cues - met     LTG's: By discharge  · Patient will report a decrease in pain to < 1/10 with urination - 3/10 tolerable  · Patient will report >75% improvement in urinary symptoms - met  · Patient will report an elimination of urinary urgency - not resolved   · Patient will be able  to tolerate an internal pelvic exam/vaginal penetration with pain <1/10 - not assessed  · Patient will be independent with HEP          Assessment/Plan  Progress to physical therapy goals is good.She reports improved pain symptoms and urinary urgency and frequency. She has met 3/3 short term goals and 1/5 long term goals. Pt tolerated internal today with decreased back pain. Began manual to posterior hip mm today with good response and decreasing pain. She will benefit from continued skilled physical therapy to address remaining impairments and functional limitations.   Progress toward previous goals: Partially Met        Recommendations: Continue as planned  Timeframe: 6 weeks  Prognosis to achieve goals: good    PT Signature: Surinder Vines PT      Based upon review of the patient's progress and continued therapy plan, it is my medical opinion that Halle De Jesus should continue physical therapy treatment at [facility] PHYS[facility] TH[facility]  [address] [place] [id number] [phone].    Signature: __________________________________  Angelica Grove, APRN    1303/1352  Manual Therapy:   38     mins  55474;  Therapeutic Exercise:    0     mins  31104;     Neuromuscular Kathryn:    0    mins  92284;    Therapeutic Activity:     8     mins  54522;     Gait Trainin     mins  15297;     Ultrasound:     0     mins  90520;    Electrical Stimulation:    0     mins  54646 ( );  Dry Needling     0     mins self-pay    Timed Treatment:   46   mins   Total Treatment:     46   mins

## 2022-01-14 ENCOUNTER — TREATMENT (OUTPATIENT)
Dept: PHYSICAL THERAPY | Facility: CLINIC | Age: 47
End: 2022-01-14

## 2022-01-14 DIAGNOSIS — N94.10 DYSPAREUNIA, FEMALE: Primary | ICD-10-CM

## 2022-01-14 DIAGNOSIS — M62.838 MUSCLE SPASM: ICD-10-CM

## 2022-01-14 DIAGNOSIS — R10.2 PELVIC PAIN: ICD-10-CM

## 2022-01-14 PROCEDURE — 97530 THERAPEUTIC ACTIVITIES: CPT | Performed by: PHYSICAL THERAPIST

## 2022-01-14 PROCEDURE — 97140 MANUAL THERAPY 1/> REGIONS: CPT | Performed by: PHYSICAL THERAPIST

## 2022-01-14 NOTE — PROGRESS NOTES
Physical Therapy Daily Progress Note  Patient: Halle De Jesus   : 1975  Diagnosis/ICD-10 Code:  Dyspareunia, female [N94.10]  Referring practitioner: JOSE RAFAEL Saldana  Date of Initial Visit: Type: THERAPY  Noted: 2021  Today's Date: 2022  Patient seen for 12 sessions      Subjective   Halle De Jesus reports a few spasms, back feeling much better for about 2 days, glutes super sore from massage.   Pain Rating (0-10): 5      Objective   verbal consent obtained for internal pelvic exam/treatment with declined need for second person in room     See Exercise, Manual, and Modality Logs for complete treatment.     Assessment/Plan  Pt with significant decrease in pain for 2 days following last treatment. She tolerated manual well today with mod discomfort that decreased with TPR. Pt to continue current stretching program. Will continue manual and progress as tolerated to decrease pain symptoms.     Progress per Plan of Care         1500/1545   Timed:         Manual Therapy:    37     mins  37634;     Therapeutic Exercise:    0     mins  77046;     Neuromuscular Kathryn:    0    mins  93558;    Therapeutic Activity:     8     mins  63416;     Gait Trainin     mins  35301;     Ultrasound:     0     mins  81961;    Ionto                               0    mins   78296  Self Care                       0     mins   35136  Canalith Repos               0    mins  07102    Un-Timed:  Electrical Stimulation:    0     mins  05647 ( );  Dry Needling     0     mins self-pay  Traction     0     mins 40277  Low Eval     0     Mins  54624  Mod Eval     0     Mins  62353  High Eval                       000     Mins  58168  Re-Eval                           0    mins  20429    Timed Treatment:   45   mins   Total Treatment:     45   mins    Surinder Vines PT  [place] License # [id number]  Physical Therapist

## 2022-01-21 ENCOUNTER — TREATMENT (OUTPATIENT)
Dept: PHYSICAL THERAPY | Facility: CLINIC | Age: 47
End: 2022-01-21

## 2022-01-21 DIAGNOSIS — M62.838 MUSCLE SPASM: ICD-10-CM

## 2022-01-21 DIAGNOSIS — N94.10 DYSPAREUNIA, FEMALE: Primary | ICD-10-CM

## 2022-01-21 DIAGNOSIS — R10.2 PELVIC PAIN: ICD-10-CM

## 2022-01-21 PROCEDURE — 97140 MANUAL THERAPY 1/> REGIONS: CPT | Performed by: PHYSICAL THERAPIST

## 2022-01-21 PROCEDURE — 97530 THERAPEUTIC ACTIVITIES: CPT | Performed by: PHYSICAL THERAPIST

## 2022-01-21 NOTE — PROGRESS NOTES
Physical Therapy Daily Progress Note  Patient: Halle De Jesus   : 1975  Diagnosis/ICD-10 Code:  Dyspareunia, female [N94.10]  Referring practitioner: JOSE RAFAEL Saldana  Date of Initial Visit: Type: THERAPY  Noted: 2021  Today's Date: 2022  Patient seen for 13 sessions      Subjective   Halle De Jesus reports cramps and back pain with period. No bladder spasms  Pain Rating (0-10): 7      Objective   verbal consent obtained for external pelvic exam/treatment with declined need for second person in room     See Exercise, Manual, and Modality Logs for complete treatment.         Assessment/Plan  Pt with period today and requests no internal. She tolerated manual to abdominal and hip flexors with mild discomfort. She is compliant with HEP and is to continue stretches at this time. Will continue manual and progress as tolerated to decrease pain and bladder symptoms.     Progress per Plan of Care         1345/1430   Timed:         Manual Therapy:    35     mins  72140;     Therapeutic Exercise:    0     mins  62166;     Neuromuscular Kathryn:    0    mins  46107;    Therapeutic Activity:     8     mins  96839;     Gait Trainin     mins  14831;     Ultrasound:     0     mins  43356;    Ionto                               0    mins   52181  Self Care                       0     mins   19014  Canalith Repos               0    mins  80093    Un-Timed:  Electrical Stimulation:    0     mins  00900 ( );  Dry Needling     0     mins self-pay  Traction     0     mins 53254  Low Eval     0     Mins  03207  Mod Eval     0     Mins  32504  High Eval                       0     Mins  19400  Re-Eval                           0    mins  22489    Timed Treatment:   43   mins   Total Treatment:     43   mins    Surinder Vines PT  [place] License # [id number]  Physical Therapist

## 2022-01-28 ENCOUNTER — TREATMENT (OUTPATIENT)
Dept: PHYSICAL THERAPY | Facility: CLINIC | Age: 47
End: 2022-01-28

## 2022-01-28 DIAGNOSIS — N94.10 DYSPAREUNIA, FEMALE: Primary | ICD-10-CM

## 2022-01-28 DIAGNOSIS — M62.838 MUSCLE SPASM: ICD-10-CM

## 2022-01-28 DIAGNOSIS — R10.2 PELVIC PAIN: ICD-10-CM

## 2022-01-28 PROCEDURE — 97140 MANUAL THERAPY 1/> REGIONS: CPT | Performed by: PHYSICAL THERAPIST

## 2022-01-28 NOTE — PROGRESS NOTES
Physical Therapy Daily Progress Note  Patient: Halle De Jesus   : 1975  Diagnosis/ICD-10 Code:  Dyspareunia, female [N94.10]  Referring practitioner: JOSE RAFAEL Saldana  Date of Initial Visit: Type: THERAPY  Noted: 2021  Today's Date: 2022  Patient seen for 14 sessions      Subjective   Halle De Jesus reports no bladder spasms, back better, states that coming off period she feels she might be getting an infection and requests no internal today.  Pain Rating (0-10): 2      Objective   verbal consent obtained for external pelvic exam/treatment with declined need for second person in room     See Exercise, Manual, and Modality Logs for complete treatment.       Assessment/Plan  Pt with decreased pain following last treatment. She has had improvement in bladder spasms. She tolerated manual with mild discomfort. Will continue manual and progress as tolerated to decrease pain symptoms.     Progress per Plan of Care         1409/1447   Timed:         Manual Therapy:    38     mins  37930;     Therapeutic Exercise:    0     mins  13204;     Neuromuscular Kathryn:    0    mins  23318;    Therapeutic Activity:     0     mins  23372;     Gait Trainin     mins  73296;     Ultrasound:     0     mins  19835;    Ionto                               0    mins   03785  Self Care                       0     mins   80054  Canalith Repos               0    mins  88058    Un-Timed:  Electrical Stimulation:    0     mins  83328 ( );  Dry Needling     0     mins self-pay  Traction     0     mins 49876  Low Eval     0     Mins  73561  Mod Eval     0     Mins  84266  High Eval                       0     Mins  96166  Re-Eval                           0    mins  37060    Timed Treatment:   38   mins   Total Treatment:     38   mins    Surinder Vines PT  [place] License # [id number]  Physical Therapist

## 2022-02-11 ENCOUNTER — TRANSCRIBE ORDERS (OUTPATIENT)
Dept: MAMMOGRAPHY | Facility: HOSPITAL | Age: 47
End: 2022-02-11

## 2022-02-11 ENCOUNTER — HOSPITAL ENCOUNTER (OUTPATIENT)
Dept: MAMMOGRAPHY | Facility: HOSPITAL | Age: 47
Discharge: HOME | End: 2022-02-11
Admitting: FAMILY MEDICINE

## 2022-02-11 DIAGNOSIS — R92.8 ABNORMAL MAMMOGRAM: Primary | ICD-10-CM

## 2022-02-11 DIAGNOSIS — R92.8 ABNORMAL MAMMOGRAM: ICD-10-CM

## 2022-02-11 PROCEDURE — G0279 TOMOSYNTHESIS, MAMMO: HCPCS

## 2022-02-11 PROCEDURE — 77062 BREAST TOMOSYNTHESIS BI: CPT | Performed by: RADIOLOGY

## 2022-02-11 PROCEDURE — 77066 DX MAMMO INCL CAD BI: CPT | Performed by: RADIOLOGY

## 2022-02-11 PROCEDURE — 77066 DX MAMMO INCL CAD BI: CPT

## 2022-02-25 ENCOUNTER — TREATMENT (OUTPATIENT)
Dept: PHYSICAL THERAPY | Facility: CLINIC | Age: 47
End: 2022-02-25

## 2022-02-25 DIAGNOSIS — R10.2 PELVIC PAIN: ICD-10-CM

## 2022-02-25 DIAGNOSIS — M62.838 MUSCLE SPASM: ICD-10-CM

## 2022-02-25 DIAGNOSIS — N94.10 DYSPAREUNIA, FEMALE: Primary | ICD-10-CM

## 2022-02-25 PROCEDURE — 97140 MANUAL THERAPY 1/> REGIONS: CPT | Performed by: PHYSICAL THERAPIST

## 2022-02-25 NOTE — PROGRESS NOTES
Re-Assessment / Re-Certification        Patient: Halle De Jesus   : 1975  Diagnosis/ICD-10 Code:  Dyspareunia, female [N94.10]  Referring practitioner: JOSE RAFAEL Saldana  Date of Initial Visit: Type: THERAPY  Noted: 2021  Today's Date: 2022  Patient seen for 15 sessions      Subjective:   Halle De Jesus reports: got Omicron and had to take steroids and having period for 12 days. Not having to take Oxybutynin. Mostly where she was prior to Covid but a little worse with not having therapy and she can tell. Pt requests no internal today due to still on period.       Subjective Questionnaire: VQ  Clinical Progress: worsened  Home Program Compliance: Yes  Treatment has included: therapeutic exercise, neuromuscular re-education, manual therapy, therapeutic activity and moist heat      Objective   verbal consent obtained for external pelvic exam/treatment with declined need for second person in room     Supine:   Iliopsoas, TrA, rectus abd- mild -moderate tension B     See flowsheet for details of treatment following reassessment.     Assessment/Plan  Progress to physical therapy goals is fair.She has not been treated x 1 month due to getting COVID. Pt with worsening symptoms related to this. She has met 3/3 short term goals and 0/5 long term goals with some previously met long term goals no longer met. She will benefit from continued skilled physical therapy to address remaining impairments and functional limitations.   Progress toward previous goals: Partially Met     STG's: 4 weeks  · Patient will report > 25% reduction in overall pain - met  · Patient will report > 25% improvement in urinary symptoms - met  · Patient will be able to perform HEP with minimal verbal cues - met     LTG's: By discharge  · Patient will report a decrease in pain to < 1/10 with urination -   · Patient will report >75% improvement in urinary symptoms -   · Patient will report an elimination of  urinary urgency -   · Patient will be able to tolerate an internal pelvic exam/vaginal penetration with pain <1/10 - not assessed  · Patient will be independent with HEP        Recommendations: Continue as planned  Timeframe: 2 months  Prognosis to achieve goals: good    PT Signature: Surinder Vines PT      Based upon review of the patient's progress and continued therapy plan, it is my medical opinion that Halle De Jesus should continue physical therapy treatment at [facility] PH[facility]AP[facility]  [address] [place] [address] [phone].    Signature: __________________________________    PHYSICIAN: Angelica Grove APRN  NPI: [id number]                                        1116/1200  Manual Therapy:    38     mins  53095;  Therapeutic Exercise:    0     mins  47833;     Neuromuscular Kathryn:    0    mins  52241;    Therapeutic Activity:     6     mins  17554;     Gait Trainin     mins  72376;     Ultrasound:     0     mins  11142;    Electrical Stimulation:    0     mins  71226 ( );  Dry Needling     0     mins self-pay    Timed Treatment:   44   mins   Total Treatment:     44   mins

## 2022-03-07 ENCOUNTER — TREATMENT (OUTPATIENT)
Dept: PHYSICAL THERAPY | Facility: CLINIC | Age: 47
End: 2022-03-07

## 2022-03-07 DIAGNOSIS — R10.2 PELVIC PAIN: ICD-10-CM

## 2022-03-07 DIAGNOSIS — M62.838 MUSCLE SPASM: ICD-10-CM

## 2022-03-07 DIAGNOSIS — N94.10 DYSPAREUNIA, FEMALE: Primary | ICD-10-CM

## 2022-03-07 PROCEDURE — 97530 THERAPEUTIC ACTIVITIES: CPT | Performed by: PHYSICAL THERAPIST

## 2022-03-07 PROCEDURE — 97140 MANUAL THERAPY 1/> REGIONS: CPT | Performed by: PHYSICAL THERAPIST

## 2022-03-09 NOTE — PROGRESS NOTES
Physical Therapy Daily Progress Note  Patient: Halle De Jesus   : 1975  Diagnosis/ICD-10 Code:  Dyspareunia, female [N94.10]  Referring practitioner: JOSE RAFAEL Saldana  Date of Initial Visit: Type: THERAPY  Noted: 2021  Today's Date: 3/9/2022  Patient seen for 16 sessions      Subjective   Halle De Jesus reports she has stopped bleeding.  She is having some increased urinary frequency with difficulty voiding.  Pain Rating (0-10): 2      Objective   verbal consent obtained for internal pelvic exam/treatment with declined need for second person in room     See Exercise, Manual, and Modality Logs for complete treatment.     Assessment/Plan  Patient tolerated manual therapy with moderate discomfort that decreased with trigger point release.  She demonstrates moderate tension of internal pelvic floor muscles today.  Patient is compliant with HEP of stretching and is to continue at this time.  We will continue manual therapy and assess response to internal treatment, as this has caused flare in symptoms previously, but also improved symptoms.    Progress per Plan of Care         5194-1798   Timed:         Manual Therapy:    34     mins  24433;     Therapeutic Exercise:    0     mins  23444;     Neuromuscular Kathryn:    0    mins  11820;    Therapeutic Activity:     8     mins  96315;     Gait Trainin     mins  76458;     Ultrasound:     0     mins  48780;    Ionto                               0    mins   96604  Self Care                       0     mins   61687  Canalith Repos               0    mins  28392    Un-Timed:  Electrical Stimulation:    0     mins  56343 ( );  Dry Needling     0     mins self-pay  Traction     0     mins 20929  Low Eval     0     Mins  63290  Mod Eval     0     Mins  79495  High Eval                       0     Mins  50395  Re-Eval                           0    mins  62285    Timed Treatment:   42   mins   Total Treatment:     42    mi Vines, PT  [place] License # [id number]  Physical Therapist

## 2022-04-01 ENCOUNTER — TREATMENT (OUTPATIENT)
Dept: PHYSICAL THERAPY | Facility: CLINIC | Age: 47
End: 2022-04-01

## 2022-04-01 DIAGNOSIS — N94.10 DYSPAREUNIA, FEMALE: Primary | ICD-10-CM

## 2022-04-01 DIAGNOSIS — R10.2 PELVIC PAIN: ICD-10-CM

## 2022-04-01 DIAGNOSIS — M62.838 MUSCLE SPASM: ICD-10-CM

## 2022-04-01 PROCEDURE — 97140 MANUAL THERAPY 1/> REGIONS: CPT | Performed by: PHYSICAL THERAPIST

## 2022-04-04 NOTE — PROGRESS NOTES
Re-Assessment / Re-Certification        Patient: Halle De Jesus   : 1975  Diagnosis/ICD-10 Code:  Dyspareunia, female [N94.10]  Referring practitioner: JOSE RAFAEL Saldana  Date of Initial Visit: Type: THERAPY  Noted: 2021  Today's Date: 2022  Patient seen for 17 sessions      Subjective:   Halle De Jesus reports: increased pain with decreased frequency of PT and having to take Oxybutinin again.       Subjective Questionnaire: VQ  Clinical Progress: worsened  Home Program Compliance: Yes  Treatment has included: therapeutic exercise, neuromuscular re-education, manual therapy, therapeutic activity and moist heat        Objective   verbal consent obtained for external pelvic exam/treatment with declined need for second person in room      Supine:   Iliopsoas, TrA, rectus abd- mild -moderate tension B   Moderate tension superficial pfm and levator ani B     See flowsheet for details of treatment following reassessment.      Assessment/Plan  Progress to physical therapy goals is fair. Pt with worsening symptoms related to decreased frequency of treatment. She has met 3/3 short term goals and 0/5 long term goals with some previously met long term goals no longer met. She will benefit from continued skilled physical therapy to address remaining impairments and functional limitations.   Progress toward previous goals: Partially Met      STG's: 4 weeks  · Patient will report > 25% reduction in overall pain - met  · Patient will report > 25% improvement in urinary symptoms - met  · Patient will be able to perform HEP with minimal verbal cues - met     LTG's: By discharge  · Patient will report a decrease in pain to < 1/10 with urination -   · Patient will report >75% improvement in urinary symptoms -   · Patient will report an elimination of urinary urgency -   · Patient will be able to tolerate an internal pelvic exam/vaginal penetration with pain <1/10 -   · Patient will be  independent with HEP                   Recommendations: Continue as planned  Timeframe: 2 months  Prognosis to achieve goals: good      PT Signature: Surinder Vines PT    Based upon review of the patient's progress and continued therapy plan, it is my medical opinion that Halle De Jesus should continue physical therapy treatment at [facility] PH[facility] TH[facility]AP[facility]  [address] [place] [id number] [phone].    Signature: __________________________________    PHYSICIAN: Angelica Grove APRN  NPI: [id number]                                        1430/1515  Manual Therapy:    38     mins  68329;  Therapeutic Exercise:    0     mins  75127;     Neuromuscular Kathryn:    0    mins  58077;    Therapeutic Activity:     5     mins  90761;     Gait Trainin     mins  78545;     Ultrasound:     0     mins  25863;    Electrical Stimulation:    000     mins  99362 ( );  Dry Needling     0     mins self-pay    Timed Treatment:   43   mins   Total Treatment:     43   mins

## 2022-04-05 ENCOUNTER — TREATMENT (OUTPATIENT)
Dept: PHYSICAL THERAPY | Facility: CLINIC | Age: 47
End: 2022-04-05

## 2022-04-05 DIAGNOSIS — M62.838 MUSCLE SPASM: ICD-10-CM

## 2022-04-05 DIAGNOSIS — N94.10 DYSPAREUNIA, FEMALE: Primary | ICD-10-CM

## 2022-04-05 DIAGNOSIS — R10.2 PELVIC PAIN: ICD-10-CM

## 2022-04-05 PROCEDURE — 97140 MANUAL THERAPY 1/> REGIONS: CPT | Performed by: PHYSICAL THERAPIST

## 2022-04-07 NOTE — PROGRESS NOTES
Physical Therapy Daily Progress Note  Patient: Halle De Jesus   : 1975  Diagnosis/ICD-10 Code:  Dyspareunia, female [N94.10]  Referring practitioner: JOSE RAFAEL Saldana  Date of Initial Visit: Type: THERAPY  Noted: 2021  Today's Date: 2022  Patient seen for 18 sessions      Subjective   Halle De Jesus reports not sure overall if things are better because she is on her period and that is always a painful time.   Pain Rating (0-10): 4      Objective   verbal consent obtained for external pelvic exam/treatment with declined need for second person in room     See Exercise, Manual, and Modality Logs for complete treatment.         Assessment/Plan  Patient tolerated manual therapy with mild discomfort that decreased with MFR and TPR.  She is compliant with stretching program and is to continue at this time.  Will resume internal treatment next visit after menstrual cycle is over.    Progress per Plan of Care         1400/1445 Timed:         Manual Therapy:    38     mins  97381;     Therapeutic Exercise:    0     mins  60867;     Neuromuscular Kathryn:    0    mins  51185;    Therapeutic Activity:     5     mins  15713;     Gait Trainin     mins  08151;     Ultrasound:     0     mins  32951;    Ionto                               0    mins   62283  Self Care                       0     mins   00664  Canalith Repos               0    mins  12320    Un-Timed:  Electrical Stimulation:    0     mins  82077 ( );  Dry Needling     0     mins self-pay  Traction     0     mins 95643  Low Eval     0     Mins  73882  Mod Eval     0     Mins  03521  High Eval                       0     Mins  80689  Re-Eval                           0    mins  04983    Timed Treatment:   43   mins   Total Treatment:     43   mins    GÓMEZ Renee License # [id number]  Physical Therapist

## 2022-04-13 ENCOUNTER — TREATMENT (OUTPATIENT)
Dept: PHYSICAL THERAPY | Facility: CLINIC | Age: 47
End: 2022-04-13

## 2022-04-13 DIAGNOSIS — N94.10 DYSPAREUNIA, FEMALE: Primary | ICD-10-CM

## 2022-04-13 DIAGNOSIS — M62.838 MUSCLE SPASM: ICD-10-CM

## 2022-04-13 DIAGNOSIS — R10.2 PELVIC PAIN: ICD-10-CM

## 2022-04-13 PROCEDURE — 97140 MANUAL THERAPY 1/> REGIONS: CPT | Performed by: PHYSICAL THERAPIST

## 2022-04-13 PROCEDURE — 97530 THERAPEUTIC ACTIVITIES: CPT | Performed by: PHYSICAL THERAPIST

## 2022-04-16 NOTE — PROGRESS NOTES
Physical Therapy Daily Progress Note  Patient: Halle De Jesus   : 1975  Diagnosis/ICD-10 Code:  Dyspareunia, female [N94.10]  Referring practitioner: JOSE RAFAEL Saldana  Date of Initial Visit: Type: THERAPY  Noted: 2021  Today's Date: 4/15/2022  Patient seen for 19 sessions      Subjective   Halle De Jesus reports decreased bladder spasms and use of medication this week, even while on period.   Pain Rating (0-10): 2      Objective   verbal consent obtained for internal pelvic exam/treatment with declined need for second person in room     See Exercise, Manual, and Modality Logs for complete treatment.     Assessment/Plan  Pt compliant with HEP. She tolerated manual with mild discomfort that decreased with MFR and TPR. Will continue manual and progress as tolerated to decrease pain.     Progress per Plan of Care         1400/1445   Timed:         Manual Therapy:    36     mins  43230;     Therapeutic Exercise:    0     mins  92527;     Neuromuscular Kathryn:    0    mins  09801;    Therapeutic Activity:     8     mins  95641;     Gait Trainin     mins  58990;     Ultrasound:     0     mins  08891;    Ionto                               0    mins   10656  Self Care                       0     mins   44111  Canalith Repos               0    mins  59529    Un-Timed:  Electrical Stimulation:    0     mins  58182 ( );  Dry Needling     0     mins self-pay  Traction     0     mins 27033  Low Eval     0     Mins  19089  Mod Eval     0     Mins  46035  High Eval                       00     Mins  89461  Re-Eval                           0    mins  65986    Timed Treatment:   44   mins   Total Treatment:     44   mins    Surinder Vines PT  [place] License # [id number]  Physical Therapist

## 2022-04-28 ENCOUNTER — TREATMENT (OUTPATIENT)
Dept: PHYSICAL THERAPY | Facility: CLINIC | Age: 47
End: 2022-04-28

## 2022-04-28 DIAGNOSIS — M62.838 MUSCLE SPASM: ICD-10-CM

## 2022-04-28 DIAGNOSIS — N94.10 DYSPAREUNIA, FEMALE: Primary | ICD-10-CM

## 2022-04-28 DIAGNOSIS — R10.2 PELVIC PAIN: ICD-10-CM

## 2022-04-28 PROCEDURE — 97140 MANUAL THERAPY 1/> REGIONS: CPT | Performed by: PHYSICAL THERAPIST

## 2022-05-01 NOTE — PROGRESS NOTES
Physical Therapy Daily Progress Note  Patient: Halle De Jesus   : 1975  Diagnosis/ICD-10 Code:  Dyspareunia, female [N94.10]  Referring practitioner: JOSE RAFAEL Saldana  Date of Initial Visit: Type: THERAPY  Noted: 2021  Today's Date: 2022  Patient seen for 20 sessions      Subjective   Halle De Jesus reports increased stress with work and getting ready for vacation.   Pain Rating (0-10): 2      Objective   verbal consent obtained for internal pelvic exam/treatment with declined need for second person in room     See Exercise, Manual, and Modality Logs for complete treatment.       Assessment/Plan  Decreased treatment today due to Pt needing to leave early for work. She tolerated manual externally with mild discomfort. Will continue manual and progress as tolerated to decrease pain.     Progress per Plan of Care          1100/1130   Timed:         Manual Therapy:    30     mins  13292;     Therapeutic Exercise:    0     mins  76517;     Neuromuscular Kathryn:    0    mins  59573;    Therapeutic Activity:     0     mins  57040;     Gait Trainin     mins  05194;     Ultrasound:     0     mins  14084;    Ionto                               0    mins   58537  Self Care                       0     mins   66431  Canalith Repos               0    mins  37577    Un-Timed:  Electrical Stimulation:    0     mins  42988 ( );  Dry Needling     0     mins self-pay  Traction     0     mins 72771  Low Eval     0     Mins  56923  Mod Eval     0     Mins  37835  High Eval                       0     Mins  08873  Re-Eval                           0    mins  13055    Timed Treatment:   30   mins   Total Treatment:     30   mins    GÓMEZ Renee License # [id number]  Physical Therapist

## 2022-05-12 ENCOUNTER — TREATMENT (OUTPATIENT)
Dept: PHYSICAL THERAPY | Facility: CLINIC | Age: 47
End: 2022-05-12

## 2022-05-12 DIAGNOSIS — R10.2 PELVIC PAIN: ICD-10-CM

## 2022-05-12 DIAGNOSIS — M62.838 MUSCLE SPASM: ICD-10-CM

## 2022-05-12 DIAGNOSIS — N94.10 DYSPAREUNIA, FEMALE: Primary | ICD-10-CM

## 2022-05-12 PROCEDURE — 97140 MANUAL THERAPY 1/> REGIONS: CPT | Performed by: PHYSICAL THERAPIST

## 2022-05-12 NOTE — PROGRESS NOTES
Re-Assessment / Re-Certification        Patient: Halle De Jesus   : 1975  Diagnosis/ICD-10 Code:  Dyspareunia, female [N94.10]  Referring practitioner: JOSE RAFAEL Saldana  Date of Initial Visit: Type: THERAPY  Noted: 2021  Today's Date: 2022  Patient seen for 21 sessions      Subjective:   Halle De Jesus reports: can tell not coming to PT things are more tight. Has had more burning every time she pees. Doesn't have as much sith consistent PT. Improvement 60-70%. Last infection 3 weeks ago. No pain with intercourse.   Subjective Questionnaire: VQ  Clinical Progress: worsened  Home Program Compliance: Yes  Treatment has included: therapeutic exercise, neuromuscular re-education, manual therapy, therapeutic activity and moist heat        Objective   verbal consent obtained for internal pelvic exam/treatment with declined need for second person in room      Supine:   Iliopsoas, TrA, rectus abd- mild tension B   Moderate tension superficial pfm and levator ani B     See flowsheet for details of treatment following reassessment.     Assessment/Plan  Progress to physical therapy goals is good.She reports improved pain and bladder symptoms. She has met 3/3 short term goals and 2/5 long term goals. She will benefit from continued skilled physical therapy to address remaining impairments and functional limitations.   Progress toward previous goals: Partially Met    STG's: 4 weeks  · Patient will report > 25% reduction in overall pain - met  · Patient will report > 25% improvement in urinary symptoms - met  · Patient will be able to perform HEP with minimal verbal cues - met     LTG's: By discharge  · Patient will report a decrease in pain to < 1/10 with urination - met previously  · Patient will report >75% improvement in urinary symptoms -   · Patient will report an elimination of urinary urgency - met  · Patient will be able to tolerate an internal pelvic exam/vaginal  penetration with pain <1/10 - met  · Patient will be independent with HEP        Recommendations: Continue as planned  Timeframe: 6 weeks  Prognosis to achieve goals: good    PT Signature: Surinder Vines PT      Based upon review of the patient's progress and continued therapy plan, it is my medical opinion that Halle De Jesus should continue physical therapy treatment at [facility]  [address] [place] [id number] [phone].    Signature: __________________________________    PHYSICIAN: Aneglica Grove APRN  NPI: [id number]                                        1445/1530  Manual Therapy:    38     mins  04733;  Therapeutic Exercise:    0     mins  49702;     Neuromuscular Kathrny:    0    mins  46397;    Therapeutic Activity:     5     mins  24070;     Gait Trainin     mins  69131;     Ultrasound:     0     mins  85350;    Electrical Stimulation:    0     mins  18609 ( );  Dry Needling     0     mins self-pay    Timed Treatment:   43   mins   Total Treatment:     43   mins

## 2022-05-20 ENCOUNTER — TREATMENT (OUTPATIENT)
Dept: PHYSICAL THERAPY | Facility: CLINIC | Age: 47
End: 2022-05-20

## 2022-05-20 DIAGNOSIS — R10.2 PELVIC PAIN: ICD-10-CM

## 2022-05-20 DIAGNOSIS — M62.838 MUSCLE SPASM: ICD-10-CM

## 2022-05-20 DIAGNOSIS — N94.10 DYSPAREUNIA, FEMALE: Primary | ICD-10-CM

## 2022-05-20 PROCEDURE — 97140 MANUAL THERAPY 1/> REGIONS: CPT | Performed by: PHYSICAL THERAPIST

## 2022-05-20 NOTE — PROGRESS NOTES
Physical Therapy Daily Progress Note  Patient: Halle De Jesus   : 1975  Diagnosis/ICD-10 Code:  Dyspareunia, female [N94.10]  Referring practitioner: JOSE RAFAEL Saldana  Date of Initial Visit: Type: THERAPY  Noted: 2021  Today's Date: 2022  Patient seen for 22 sessions      Subjective   Halle De Jesus reports on period. Had bloodwork and high white blood cell count and low Vit D. Increased pain due to menstruation.     Pain Rating (0-10): 8      Objective   verbal consent obtained for external pelvic exam/treatment with declined need for second person in room     See Exercise, Manual, and Modality Logs for complete treatment.         Assessment/Plan  Pt with increased pain related to menstrual cycle this week. She tolerated external treatment with mild discomfort. Pt compliant with HEP and is to continue current stretching program. Will resume internal next visit.     Progress per Plan of Care         1345/1428   Timed:         Manual Therapy:    38     mins  01951;     Therapeutic Exercise:    0     mins  40489;     Neuromuscular Kathryn:    0    mins  17053;    Therapeutic Activity:     5     mins  46658;     Gait Trainin     mins  59270;     Ultrasound:     0     mins  42221;    Ionto                               0    mins   51553  Self Care                       0     mins   04312  Canalith Repos               0    mins  15531    Un-Timed:  Electrical Stimulation:    0     mins  62101 ( );  Dry Needling     0     mins self-pay  Traction     0     mins 89362  Low Eval     0     Mins  93837  Mod Eval     0     Mins  99541  High Eval                       0     Mins  57729  Re-Eval                           0    mins  75330    Timed Treatment:   43   mins   Total Treatment:     43   mins    Surinder Vines PT  [place] License # [id number]  Physical Therapist

## 2022-06-01 ENCOUNTER — TREATMENT (OUTPATIENT)
Dept: PHYSICAL THERAPY | Facility: CLINIC | Age: 47
End: 2022-06-01

## 2022-06-01 DIAGNOSIS — N94.10 DYSPAREUNIA, FEMALE: Primary | ICD-10-CM

## 2022-06-01 DIAGNOSIS — M62.838 MUSCLE SPASM: ICD-10-CM

## 2022-06-01 DIAGNOSIS — R10.2 PELVIC PAIN: ICD-10-CM

## 2022-06-01 PROCEDURE — 97140 MANUAL THERAPY 1/> REGIONS: CPT | Performed by: PHYSICAL THERAPIST

## 2022-06-01 NOTE — PROGRESS NOTES
Physical Therapy Daily Progress Note  Patient: Halle De Jesus   : 1975  Diagnosis/ICD-10 Code:  Dyspareunia, female [N94.10]  Referring practitioner: JOSE RAFAEL Saldana  Date of Initial Visit: Type: THERAPY  Noted: 2021  Today's Date: 2022  Patient seen for 23 sessions      Subjective   Halle De Jesus reports since back to PT down to no meds for bladder spasms. This week some more pain with sitting for hours for travel for work 2-3 hours each way. Today external only. 3/10 in plevic area, 7/10 in back.     Pain Rating (0-10): 3      Objective   verbal consent obtained for external pelvic exam/treatment with declined need for second person in room     See Exercise, Manual, and Modality Logs for complete treatment.       Assessment/Plan  Pt having improvement in pain and bladder symptoms with regular physical therapy treatment. She tolerated manual with mild discomfort. Will resume internal next visit to further decrease pelvic pain and bladder symptoms.     Progress per Plan of Care         1400/1445 Timed:         Manual Therapy:    38     mins  32322;     Therapeutic Exercise:    0     mins  51652;     Neuromuscular Kathryn:    0    mins  32473;    Therapeutic Activity:     5     mins  07755;     Gait Trainin     mins  15900;     Ultrasound:     0     mins  79495;    Ionto                               0    mins   60482  Self Care                       0     mins   48253  Canalith Repos               0    mins  83538    Un-Timed:  Electrical Stimulation:    0     mins  65006 ( );  Dry Needling     0     mins self-pay  Traction     0     mins 26257  Low Eval     0     Mins  97048  Mod Eval     0     Mins  33048  High Eval                       0     Mins  29298  Re-Eval                           0    mins  40919    Timed Treatment:   43   mins   Total Treatment:     43   mins    Surinder Vines PT  [place] License # [id number]  Physical Therapist

## 2022-06-09 ENCOUNTER — TREATMENT (OUTPATIENT)
Dept: PHYSICAL THERAPY | Facility: CLINIC | Age: 47
End: 2022-06-09

## 2022-06-09 DIAGNOSIS — M62.838 MUSCLE SPASM: ICD-10-CM

## 2022-06-09 DIAGNOSIS — R10.2 PELVIC PAIN: ICD-10-CM

## 2022-06-09 DIAGNOSIS — N94.10 DYSPAREUNIA, FEMALE: Primary | ICD-10-CM

## 2022-06-09 PROCEDURE — 97140 MANUAL THERAPY 1/> REGIONS: CPT | Performed by: PHYSICAL THERAPIST

## 2022-06-09 NOTE — PROGRESS NOTES
Physical Therapy Daily Progress Note  Patient: Halle De Jesus   : 1975  Diagnosis/ICD-10 Code:  Dyspareunia, female [N94.10]  Referring practitioner: JOSE RAFAEL Saldana  Date of Initial Visit: Type: THERAPY  Noted: 2021  Today's Date: 2022  Patient seen for 24 sessions      Subjective   Halle De Jesus reports some LBP 2-3/10 mild. Otherwise doing well and not having to take bladder spasm medication and sleeping through the night.     Pain Rating (0-10): 2      Objective   verbal consent obtained for internal pelvic exam/treatment with declined need for second person in room     See Exercise, Manual, and Modality Logs for complete treatment.       Assessment/Plan  Pt having decreased pain and urinary symptoms. She demonstrates decreasing tension of hip, abdominal and pelvic floor mm. She is compliant with HEP and is to continue current stretching program at this time. Will continue manual and progress as tolerated to assist with resolving symptoms.     Progress per Plan of Care         1446/1530   Timed:         Manual Therapy:    38     mins  91744;     Therapeutic Exercise:    0     mins  14757;     Neuromuscular Kathryn:    0    mins  53578;    Therapeutic Activity:     5     mins  09742;     Gait Trainin     mins  82348;     Ultrasound:     0     mins  16977;    Ionto                               0    mins   60141  Self Care                       0     mins   76387  Canalith Repos               0    mins  49214    Un-Timed:  Electrical Stimulation:    0     mins  91775 ( );  Dry Needling     0     mins self-pay  Traction     0     mins 60207  Low Eval     0     Mins  21975  Mod Eval     0     Mins  59949  High Eval                       0     Mins  81349  Re-Eval                           0    mins  40264    Timed Treatment:   43   mins   Total Treatment:     43   mins    Surinder Vines PT  [place] License # [id number]  Physical Therapist

## 2022-06-22 ENCOUNTER — TREATMENT (OUTPATIENT)
Dept: PHYSICAL THERAPY | Facility: CLINIC | Age: 47
End: 2022-06-22

## 2022-06-22 DIAGNOSIS — R10.2 PELVIC PAIN: ICD-10-CM

## 2022-06-22 DIAGNOSIS — M62.838 MUSCLE SPASM: ICD-10-CM

## 2022-06-22 DIAGNOSIS — N94.10 DYSPAREUNIA, FEMALE: Primary | ICD-10-CM

## 2022-06-22 PROCEDURE — 97110 THERAPEUTIC EXERCISES: CPT | Performed by: PHYSICAL THERAPIST

## 2022-06-22 PROCEDURE — 97140 MANUAL THERAPY 1/> REGIONS: CPT | Performed by: PHYSICAL THERAPIST

## 2022-06-22 NOTE — PROGRESS NOTES
Re-Assessment / Re-Certification        Patient: Halle De Jesus   : 1975  Diagnosis/ICD-10 Code:  Dyspareunia, female [N94.10]  Referring practitioner: JOSE RAFAEL Saldana  Date of Initial Visit: Type: THERAPY  Noted: 2021  Today's Date: 2022  Patient seen for 25 sessions      Subjective:   Halle De Jesus reports: increased frequency had abnormal urinalysis Friday. No internal. Not having pain.   Subjective Questionnaire: VQ  Clinical Progress: worsened  Home Program Compliance: Yes  Treatment has included: therapeutic exercise, neuromuscular re-education, manual therapy, therapeutic activity and moist heat        Objective   verbal consent obtained for internal pelvic exam/treatment with declined need for second person in room      Supine:   Iliopsoas, TrA, rectus abd- mild tension B     Assessment/Plan  Progress to physical therapy goals is good.She reports improved pain and bladder symptoms. She has met 3/3 short term goals and 3/5 long term goals. She will benefit from continued skilled physical therapy to address remaining impairments and functional limitations. She had recent abnormal urinalysis so no internal exam/treatment today.   Progress toward previous goals: Partially Met     STG's: 4 weeks  · Patient will report > 25% reduction in overall pain - met  · Patient will report > 25% improvement in urinary symptoms - met  · Patient will be able to perform HEP with minimal verbal cues - met     LTG's: By discharge  · Patient will report a decrease in pain to < 1/10 with urination - met  · Patient will report >75% improvement in urinary symptoms -   · Patient will report an elimination of urinary urgency - met  · Patient will be able to tolerate an internal pelvic exam/vaginal penetration with pain <1/10 - met  · Patient will be independent with HEP          Recommendations: Continue as planned  Timeframe: 6 weeks  Prognosis to achieve goals: good    PT  Signature: Surinder Vines PT      Based upon review of the patient's progress and continued therapy plan, it is my medical opinion that Halle De Jesus should continue physical therapy treatment at [facility]  [address] [place] [id number] [phone].    Signature: __________________________________    PHYSICIAN: Angelica Grove APRN  NPI: [id number]                                        1350/1430  Manual Therapy:    30     mins  90660;  Therapeutic Exercise:    0     mins  48897;     Neuromuscular Kathryn:    0    mins  65856;    Therapeutic Activity:     8     mins  02091;     Gait Trainin     mins  28980;     Ultrasound:     0     mins  56069;    Electrical Stimulation:    0     mins  44583 ( );  Dry Needling     0     mins self-pay    Timed Treatment:   40   mins   Total Treatment:     40   mins

## 2022-06-28 ENCOUNTER — TREATMENT (OUTPATIENT)
Dept: PHYSICAL THERAPY | Facility: CLINIC | Age: 47
End: 2022-06-28

## 2022-06-28 DIAGNOSIS — M62.838 MUSCLE SPASM: ICD-10-CM

## 2022-06-28 DIAGNOSIS — R10.2 PELVIC PAIN: ICD-10-CM

## 2022-06-28 DIAGNOSIS — N94.10 DYSPAREUNIA, FEMALE: Primary | ICD-10-CM

## 2022-06-28 PROCEDURE — 97530 THERAPEUTIC ACTIVITIES: CPT | Performed by: PHYSICAL THERAPIST

## 2022-06-28 PROCEDURE — 97140 MANUAL THERAPY 1/> REGIONS: CPT | Performed by: PHYSICAL THERAPIST

## 2022-07-01 NOTE — PROGRESS NOTES
Physical Therapy Daily Treatment Note  Patient: Halle De Jesus   : 1975  Diagnosis/ICD-10 Code:  Dyspareunia, female [N94.10]  Referring practitioner: JOSE RAFAEL Saldana  Date of Initial Visit: Type: THERAPY  Noted: 2021  Today's Date: 2022  Patient seen for 26 sessions      Subjective   Halle De Jesus reports she still has infection and is really not feeling well. Planning to follow up with MD.   Pain Rating (0-10): 4      Objective   verbal consent obtained for external pelvic exam/treatment with declined need for second person in room     See Exercise, Manual, and Modality Logs for complete treatment.     Assessment/Plan  Pt with continued active UTI and feeling generally unwell. Encouraged follow up with PCP regarding this. She tolerated manual to external mm only today with mod discomfort. Will continue manual and resume internal as indicated once UTI has cleared.     Progress per Plan of Care         1605/1645   Timed:         Manual Therapy:    32     mins  21031;     Therapeutic Exercise:    0     mins  02029;     Neuromuscular Kathryn:    0    mins  07825;    Therapeutic Activity:     8     mins  32443;     Gait Trainin     mins  67400;     Ultrasound:     0     mins  86482;    Ionto                               0    mins   93662  Self Care                       0     mins   13141  Canalith Repos               0    mins  35818    Un-Timed:  Electrical Stimulation:    0     mins  90573 ( );  Dry Needling     0     mins self-pay  Traction     0     mins 13519  Low Eval     0     Mins  36445  Mod Eval     0     Mins  05765  High Eval                       0     Mins  79491  Re-Eval                           0    mins  22715    Timed Treatment:   40   mins   Total Treatment:     40   mins    Surinder Vines PT  [place] License # [id number]  Physical Therapist

## 2022-07-26 ENCOUNTER — TREATMENT (OUTPATIENT)
Dept: PHYSICAL THERAPY | Facility: CLINIC | Age: 47
End: 2022-07-26

## 2022-07-26 DIAGNOSIS — N94.10 DYSPAREUNIA, FEMALE: ICD-10-CM

## 2022-07-26 DIAGNOSIS — R10.2 PELVIC PAIN: Primary | ICD-10-CM

## 2022-07-26 DIAGNOSIS — M62.838 MUSCLE SPASM: ICD-10-CM

## 2022-07-26 PROCEDURE — 97140 MANUAL THERAPY 1/> REGIONS: CPT | Performed by: PHYSICAL THERAPIST

## 2022-07-27 ENCOUNTER — HOSPITAL ENCOUNTER (OUTPATIENT)
Facility: HOSPITAL | Age: 47
Setting detail: OBSERVATION
Discharge: HOME | End: 2022-07-28
Attending: INTERNAL MEDICINE | Admitting: INTERNAL MEDICINE

## 2022-07-27 ENCOUNTER — APPOINTMENT (OUTPATIENT)
Dept: CT IMAGING | Facility: HOSPITAL | Age: 47
End: 2022-07-27

## 2022-07-27 PROBLEM — N39.0 UTI (URINARY TRACT INFECTION): Status: ACTIVE | Noted: 2022-07-27

## 2022-07-27 LAB
ALBUMIN SERPL-MCNC: 4.2 G/DL (ref 3.5–5.2)
ALBUMIN/GLOB SERPL: 1.5 G/DL
ALP SERPL-CCNC: 63 U/L (ref 39–117)
ALT SERPL W P-5'-P-CCNC: 13 U/L (ref 1–33)
ANION GAP SERPL CALCULATED.3IONS-SCNC: 11 MMOL/L (ref 5–15)
AST SERPL-CCNC: 14 U/L (ref 1–32)
BASOPHILS # BLD AUTO: 0.05 10*3/MM3 (ref 0–0.2)
BASOPHILS NFR BLD AUTO: 0.4 % (ref 0–1.5)
BILIRUB SERPL-MCNC: 0.4 MG/DL (ref 0–1.2)
BILIRUB UR QL STRIP: NEGATIVE
BUN SERPL-MCNC: 11 MG/DL (ref 6–20)
BUN/CREAT SERPL: 10.3 (ref 7–25)
CALCIUM SPEC-SCNC: 9.7 MG/DL (ref 8.6–10.5)
CHLORIDE SERPL-SCNC: 100 MMOL/L (ref 98–107)
CLARITY UR: CLEAR
CO2 SERPL-SCNC: 24 MMOL/L (ref 22–29)
COLOR UR: YELLOW
CREAT SERPL-MCNC: 1.07 MG/DL (ref 0.57–1)
DEPRECATED RDW RBC AUTO: 40.1 FL (ref 37–54)
EGFRCR SERPLBLD CKD-EPI 2021: 65 ML/MIN/1.73
EOSINOPHIL # BLD AUTO: 0.07 10*3/MM3 (ref 0–0.4)
EOSINOPHIL NFR BLD AUTO: 0.6 % (ref 0.3–6.2)
ERYTHROCYTE [DISTWIDTH] IN BLOOD BY AUTOMATED COUNT: 12.7 % (ref 12.3–15.4)
FLUAV RNA RESP QL NAA+PROBE: NOT DETECTED
FLUBV RNA RESP QL NAA+PROBE: NOT DETECTED
GLOBULIN UR ELPH-MCNC: 2.8 GM/DL
GLUCOSE SERPL-MCNC: 101 MG/DL (ref 65–99)
GLUCOSE UR STRIP-MCNC: NEGATIVE MG/DL
HCT VFR BLD AUTO: 39.2 % (ref 34–46.6)
HGB BLD-MCNC: 13.7 G/DL (ref 12–15.9)
HGB UR QL STRIP.AUTO: NEGATIVE
IMM GRANULOCYTES # BLD AUTO: 0.04 10*3/MM3 (ref 0–0.05)
IMM GRANULOCYTES NFR BLD AUTO: 0.4 % (ref 0–0.5)
KETONES UR QL STRIP: NEGATIVE
LEUKOCYTE ESTERASE UR QL STRIP.AUTO: NEGATIVE
LYMPHOCYTES # BLD AUTO: 2.83 10*3/MM3 (ref 0.7–3.1)
LYMPHOCYTES NFR BLD AUTO: 24.8 % (ref 19.6–45.3)
MAGNESIUM SERPL-MCNC: 2.2 MG/DL (ref 1.6–2.6)
MCH RBC QN AUTO: 30.2 PG (ref 26.6–33)
MCHC RBC AUTO-ENTMCNC: 34.9 G/DL (ref 31.5–35.7)
MCV RBC AUTO: 86.3 FL (ref 79–97)
MONOCYTES # BLD AUTO: 0.64 10*3/MM3 (ref 0.1–0.9)
MONOCYTES NFR BLD AUTO: 5.6 % (ref 5–12)
NEUTROPHILS NFR BLD AUTO: 68.2 % (ref 42.7–76)
NEUTROPHILS NFR BLD AUTO: 7.78 10*3/MM3 (ref 1.7–7)
NITRITE UR QL STRIP: NEGATIVE
NRBC BLD AUTO-RTO: 0 /100 WBC (ref 0–0.2)
PH UR STRIP.AUTO: 6 [PH] (ref 5–8)
PLATELET # BLD AUTO: 300 10*3/MM3 (ref 140–450)
PMV BLD AUTO: 11.6 FL (ref 6–12)
POTASSIUM SERPL-SCNC: 4.7 MMOL/L (ref 3.5–5.2)
PROT SERPL-MCNC: 7 G/DL (ref 6–8.5)
PROT UR QL STRIP: NEGATIVE
RBC # BLD AUTO: 4.54 10*6/MM3 (ref 3.77–5.28)
SARS-COV-2 RNA RESP QL NAA+PROBE: NOT DETECTED
SODIUM SERPL-SCNC: 135 MMOL/L (ref 136–145)
SP GR UR STRIP: 1.01 (ref 1–1.03)
UROBILINOGEN UR QL STRIP: NORMAL
WBC NRBC COR # BLD: 11.41 10*3/MM3 (ref 3.4–10.8)

## 2022-07-27 PROCEDURE — 83735 ASSAY OF MAGNESIUM: CPT | Performed by: NURSE PRACTITIONER

## 2022-07-27 PROCEDURE — G0379 DIRECT REFER HOSPITAL OBSERV: HCPCS

## 2022-07-27 PROCEDURE — 96372 THER/PROPH/DIAG INJ SC/IM: CPT

## 2022-07-27 PROCEDURE — G0378 HOSPITAL OBSERVATION PER HR: HCPCS

## 2022-07-27 PROCEDURE — C9803 HOPD COVID-19 SPEC COLLECT: HCPCS

## 2022-07-27 PROCEDURE — 99218 PR INITIAL OBSERVATION CARE/DAY 30 MINUTES: CPT | Performed by: NURSE PRACTITIONER

## 2022-07-27 PROCEDURE — 25010000002 CEFTRIAXONE PER 250 MG: Performed by: NURSE PRACTITIONER

## 2022-07-27 PROCEDURE — 80053 COMPREHEN METABOLIC PANEL: CPT | Performed by: NURSE PRACTITIONER

## 2022-07-27 PROCEDURE — 25010000002 ENOXAPARIN PER 10 MG: Performed by: NURSE PRACTITIONER

## 2022-07-27 PROCEDURE — 81003 URINALYSIS AUTO W/O SCOPE: CPT | Performed by: NURSE PRACTITIONER

## 2022-07-27 PROCEDURE — 74176 CT ABD & PELVIS W/O CONTRAST: CPT

## 2022-07-27 PROCEDURE — 96360 HYDRATION IV INFUSION INIT: CPT

## 2022-07-27 PROCEDURE — 96361 HYDRATE IV INFUSION ADD-ON: CPT

## 2022-07-27 PROCEDURE — 0 DIATRIZOATE MEGLUMINE & SODIUM PER 1 ML

## 2022-07-27 PROCEDURE — 87636 SARSCOV2 & INF A&B AMP PRB: CPT | Performed by: INTERNAL MEDICINE

## 2022-07-27 PROCEDURE — 85025 COMPLETE CBC W/AUTO DIFF WBC: CPT | Performed by: NURSE PRACTITIONER

## 2022-07-27 RX ORDER — AMOXICILLIN 250 MG
2 CAPSULE ORAL 2 TIMES DAILY
Status: DISCONTINUED | OUTPATIENT
Start: 2022-07-27 | End: 2022-07-28 | Stop reason: HOSPADM

## 2022-07-27 RX ORDER — ACETAMINOPHEN 650 MG/1
650 SUPPOSITORY RECTAL EVERY 4 HOURS PRN
Status: DISCONTINUED | OUTPATIENT
Start: 2022-07-27 | End: 2022-07-28 | Stop reason: HOSPADM

## 2022-07-27 RX ORDER — LISINOPRIL 20 MG/1
20 TABLET ORAL DAILY
Status: DISCONTINUED | OUTPATIENT
Start: 2022-07-27 | End: 2022-07-28 | Stop reason: HOSPADM

## 2022-07-27 RX ORDER — POTASSIUM CHLORIDE 1.5 G/1.77G
40 POWDER, FOR SOLUTION ORAL AS NEEDED
Status: DISCONTINUED | OUTPATIENT
Start: 2022-07-27 | End: 2022-07-28 | Stop reason: HOSPADM

## 2022-07-27 RX ORDER — MAGNESIUM SULFATE HEPTAHYDRATE 40 MG/ML
2 INJECTION, SOLUTION INTRAVENOUS AS NEEDED
Status: DISCONTINUED | OUTPATIENT
Start: 2022-07-27 | End: 2022-07-28 | Stop reason: HOSPADM

## 2022-07-27 RX ORDER — MAGNESIUM SULFATE HEPTAHYDRATE 40 MG/ML
4 INJECTION, SOLUTION INTRAVENOUS AS NEEDED
Status: DISCONTINUED | OUTPATIENT
Start: 2022-07-27 | End: 2022-07-28 | Stop reason: HOSPADM

## 2022-07-27 RX ORDER — ONDANSETRON 4 MG/1
4 TABLET, FILM COATED ORAL EVERY 6 HOURS PRN
Status: DISCONTINUED | OUTPATIENT
Start: 2022-07-27 | End: 2022-07-28 | Stop reason: HOSPADM

## 2022-07-27 RX ORDER — BISACODYL 10 MG
10 SUPPOSITORY, RECTAL RECTAL DAILY PRN
Status: DISCONTINUED | OUTPATIENT
Start: 2022-07-27 | End: 2022-07-28 | Stop reason: HOSPADM

## 2022-07-27 RX ORDER — POTASSIUM CHLORIDE 7.45 MG/ML
10 INJECTION INTRAVENOUS
Status: DISCONTINUED | OUTPATIENT
Start: 2022-07-27 | End: 2022-07-28 | Stop reason: HOSPADM

## 2022-07-27 RX ORDER — L.ACID,PARA/B.BIFIDUM/S.THERM 8B CELL
1 CAPSULE ORAL DAILY
Status: DISCONTINUED | OUTPATIENT
Start: 2022-07-27 | End: 2022-07-28 | Stop reason: HOSPADM

## 2022-07-27 RX ORDER — ONDANSETRON 2 MG/ML
4 INJECTION INTRAMUSCULAR; INTRAVENOUS EVERY 6 HOURS PRN
Status: DISCONTINUED | OUTPATIENT
Start: 2022-07-27 | End: 2022-07-28 | Stop reason: HOSPADM

## 2022-07-27 RX ORDER — CALCIUM CARBONATE 200(500)MG
2 TABLET,CHEWABLE ORAL 2 TIMES DAILY PRN
Status: DISCONTINUED | OUTPATIENT
Start: 2022-07-27 | End: 2022-07-28 | Stop reason: HOSPADM

## 2022-07-27 RX ORDER — ENOXAPARIN SODIUM 100 MG/ML
40 INJECTION SUBCUTANEOUS NIGHTLY
Status: DISCONTINUED | OUTPATIENT
Start: 2022-07-27 | End: 2022-07-28 | Stop reason: HOSPADM

## 2022-07-27 RX ORDER — LEVOFLOXACIN 750 MG/1
750 TABLET ORAL DAILY
Status: ON HOLD | COMMUNITY
End: 2022-07-27

## 2022-07-27 RX ORDER — SODIUM CHLORIDE 9 MG/ML
200 INJECTION, SOLUTION INTRAVENOUS CONTINUOUS
Status: DISCONTINUED | OUTPATIENT
Start: 2022-07-27 | End: 2022-07-28

## 2022-07-27 RX ORDER — POTASSIUM CHLORIDE 750 MG/1
40 CAPSULE, EXTENDED RELEASE ORAL AS NEEDED
Status: DISCONTINUED | OUTPATIENT
Start: 2022-07-27 | End: 2022-07-28 | Stop reason: HOSPADM

## 2022-07-27 RX ORDER — LISINOPRIL 20 MG/1
20 TABLET ORAL DAILY
COMMUNITY
End: 2022-11-04 | Stop reason: SINTOL

## 2022-07-27 RX ORDER — ACETAMINOPHEN 325 MG/1
650 TABLET ORAL EVERY 4 HOURS PRN
Status: DISCONTINUED | OUTPATIENT
Start: 2022-07-27 | End: 2022-07-28 | Stop reason: HOSPADM

## 2022-07-27 RX ORDER — SODIUM CHLORIDE 9 MG/ML
100 INJECTION, SOLUTION INTRAVENOUS CONTINUOUS
Status: DISCONTINUED | OUTPATIENT
Start: 2022-07-28 | End: 2022-07-28 | Stop reason: HOSPADM

## 2022-07-27 RX ORDER — POLYETHYLENE GLYCOL 3350 17 G/17G
17 POWDER, FOR SOLUTION ORAL DAILY PRN
Status: DISCONTINUED | OUTPATIENT
Start: 2022-07-27 | End: 2022-07-28 | Stop reason: HOSPADM

## 2022-07-27 RX ORDER — BISACODYL 5 MG/1
5 TABLET, DELAYED RELEASE ORAL DAILY PRN
Status: DISCONTINUED | OUTPATIENT
Start: 2022-07-27 | End: 2022-07-28 | Stop reason: HOSPADM

## 2022-07-27 RX ORDER — SODIUM CHLORIDE 0.9 % (FLUSH) 0.9 %
10 SYRINGE (ML) INJECTION EVERY 12 HOURS SCHEDULED
Status: DISCONTINUED | OUTPATIENT
Start: 2022-07-27 | End: 2022-07-28 | Stop reason: HOSPADM

## 2022-07-27 RX ORDER — ACETAMINOPHEN 160 MG/5ML
650 SOLUTION ORAL EVERY 4 HOURS PRN
Status: DISCONTINUED | OUTPATIENT
Start: 2022-07-27 | End: 2022-07-28 | Stop reason: HOSPADM

## 2022-07-27 RX ORDER — CETIRIZINE HYDROCHLORIDE 10 MG/1
10 TABLET ORAL DAILY
Status: DISCONTINUED | OUTPATIENT
Start: 2022-07-27 | End: 2022-07-28 | Stop reason: HOSPADM

## 2022-07-27 RX ORDER — SODIUM CHLORIDE 0.9 % (FLUSH) 0.9 %
10 SYRINGE (ML) INJECTION AS NEEDED
Status: DISCONTINUED | OUTPATIENT
Start: 2022-07-27 | End: 2022-07-28 | Stop reason: HOSPADM

## 2022-07-27 RX ADMIN — SODIUM CHLORIDE 200 ML/HR: 9 INJECTION, SOLUTION INTRAVENOUS at 17:46

## 2022-07-27 RX ADMIN — ENOXAPARIN SODIUM 40 MG: 40 INJECTION SUBCUTANEOUS at 20:19

## 2022-07-27 RX ADMIN — SODIUM CHLORIDE 1 G: 900 INJECTION INTRAVENOUS at 18:43

## 2022-07-27 RX ADMIN — DIATRIZOATE MEGLUMINE AND DIATRIZOATE SODIUM 15 ML: 660; 100 LIQUID ORAL; RECTAL at 17:44

## 2022-07-27 RX ADMIN — ACETAMINOPHEN 650 MG: 325 TABLET ORAL at 17:06

## 2022-07-27 RX ADMIN — LISINOPRIL 20 MG: 20 TABLET ORAL at 17:06

## 2022-07-27 RX ADMIN — ACETAMINOPHEN 650 MG: 325 TABLET ORAL at 22:16

## 2022-07-28 VITALS
TEMPERATURE: 98.3 F | OXYGEN SATURATION: 99 % | RESPIRATION RATE: 16 BRPM | HEART RATE: 69 BPM | DIASTOLIC BLOOD PRESSURE: 58 MMHG | SYSTOLIC BLOOD PRESSURE: 112 MMHG

## 2022-07-28 LAB
ANION GAP SERPL CALCULATED.3IONS-SCNC: 11 MMOL/L (ref 5–15)
BUN SERPL-MCNC: 8 MG/DL (ref 6–20)
BUN/CREAT SERPL: 8.9 (ref 7–25)
CALCIUM SPEC-SCNC: 9 MG/DL (ref 8.6–10.5)
CHLORIDE SERPL-SCNC: 105 MMOL/L (ref 98–107)
CO2 SERPL-SCNC: 23 MMOL/L (ref 22–29)
CREAT SERPL-MCNC: 0.9 MG/DL (ref 0.57–1)
DEPRECATED RDW RBC AUTO: 40.5 FL (ref 37–54)
EGFRCR SERPLBLD CKD-EPI 2021: 80 ML/MIN/1.73
ERYTHROCYTE [DISTWIDTH] IN BLOOD BY AUTOMATED COUNT: 12.5 % (ref 12.3–15.4)
GLUCOSE SERPL-MCNC: 84 MG/DL (ref 65–99)
HCT VFR BLD AUTO: 42.4 % (ref 34–46.6)
HGB BLD-MCNC: 14.2 G/DL (ref 12–15.9)
MAGNESIUM SERPL-MCNC: 2.2 MG/DL (ref 1.6–2.6)
MCH RBC QN AUTO: 29.6 PG (ref 26.6–33)
MCHC RBC AUTO-ENTMCNC: 33.5 G/DL (ref 31.5–35.7)
MCV RBC AUTO: 88.5 FL (ref 79–97)
PLATELET # BLD AUTO: 304 10*3/MM3 (ref 140–450)
PMV BLD AUTO: 11.4 FL (ref 6–12)
POTASSIUM SERPL-SCNC: 4 MMOL/L (ref 3.5–5.2)
RBC # BLD AUTO: 4.79 10*6/MM3 (ref 3.77–5.28)
SODIUM SERPL-SCNC: 139 MMOL/L (ref 136–145)
WBC NRBC COR # BLD: 7.56 10*3/MM3 (ref 3.4–10.8)

## 2022-07-28 PROCEDURE — G0378 HOSPITAL OBSERVATION PER HR: HCPCS

## 2022-07-28 PROCEDURE — 80048 BASIC METABOLIC PNL TOTAL CA: CPT | Performed by: NURSE PRACTITIONER

## 2022-07-28 PROCEDURE — 63710000001 ONDANSETRON PER 8 MG: Performed by: NURSE PRACTITIONER

## 2022-07-28 PROCEDURE — 99217 PR OBSERVATION CARE DISCHARGE MANAGEMENT: CPT | Performed by: INTERNAL MEDICINE

## 2022-07-28 PROCEDURE — 85027 COMPLETE CBC AUTOMATED: CPT | Performed by: NURSE PRACTITIONER

## 2022-07-28 PROCEDURE — 83735 ASSAY OF MAGNESIUM: CPT | Performed by: NURSE PRACTITIONER

## 2022-07-28 RX ADMIN — ACETAMINOPHEN 650 MG: 325 TABLET ORAL at 07:30

## 2022-07-28 RX ADMIN — ONDANSETRON HYDROCHLORIDE 4 MG: 4 TABLET, FILM COATED ORAL at 07:30

## 2022-07-28 RX ADMIN — SODIUM CHLORIDE 100 ML/HR: 9 INJECTION, SOLUTION INTRAVENOUS at 03:15

## 2022-07-28 RX ADMIN — SODIUM CHLORIDE 200 ML/HR: 9 INJECTION, SOLUTION INTRAVENOUS at 00:06

## 2022-07-29 NOTE — PROGRESS NOTES
Re-Assessment / Re-Certification        Patient: Halle Rea   : 1975  Diagnosis/ICD-10 Code:  Pelvic pain [R10.2]  Referring practitioner: JOSE RAFAEL Saldana  Date of Initial Visit: Type: THERAPY  Noted: 2021  Today's Date: 2022  Patient seen for 27 sessions      Subjective:   Halle Rea reports: she has a kidney infection and is working with MDs to get on the right antibiotics. States she has been told previously by [facility] that oral antibiotics will likely not continue to work for her. She was having significant improvement in symptoms but now has pain and increased bladder symptoms with infection.     Subjective Questionnaire: VQ  Clinical Progress: worsened  Home Program Compliance: Yes  Treatment has included: therapeutic exercise, neuromuscular re-education, manual therapy, therapeutic activity and moist heat        Objective   verbal consent obtained for external pelvic exam/treatment with declined need for second person in room      Supine:   Iliopsoas, TrA, rectus abd- mild tension B      Assessment/Plan  Pt previously with significant improvement in bladder symptoms and pain but currently has ongoing kidney infection which has exacerbated symptoms. She has met 3/3 short term goals and 3/5 long term goals. She will benefit from continued skilled physical therapy to address remaining impairments and functional limitations.     Progress toward previous goals: Partially Met     STG's: 4 weeks  · Patient will report > 25% reduction in overall pain - met  · Patient will report > 25% improvement in urinary symptoms - met  · Patient will be able to perform HEP with minimal verbal cues - met     LTG's: By discharge  · Patient will report a decrease in pain to < 1/10 with urination - met  · Patient will report >75% improvement in urinary symptoms -   · Patient will report an elimination of urinary urgency - met  · Patient will be able to tolerate an  internal pelvic exam/vaginal penetration with pain <1/10 - met  · Patient will be independent with HEP                      Recommendations: Continue as planned  Timeframe: 6 weeks  Prognosis to achieve goals: good     PT Signature: Surinder Vines PT    Based upon review of the patient's progress and continued therapy plan, it is my medical opinion that Halle Rea should continue physical therapy treatment at [facility]  [address] [place] [id number] [phone].    Signature: __________________________________    PHYSICIAN: Angelica Grove, JOSE RAFAEL  NPI: [id number]                                        1441/1530  Manual Therapy:    38     mins  55249;  Therapeutic Exercise:    0     mins  87025;     Neuromuscular Kathryn:    0    mins  45477;    Therapeutic Activity:     5     mins  61464;     Gait Trainin     mins  33295;     Ultrasound:     0     mins  07325;    Electrical Stimulation:    0     mins  79566 ( );  Dry Needling     0     mins self-pay    Timed Treatment:   43   mins   Total Treatment:     43   mins

## 2022-08-08 ENCOUNTER — TREATMENT (OUTPATIENT)
Dept: PHYSICAL THERAPY | Facility: CLINIC | Age: 47
End: 2022-08-08

## 2022-08-08 DIAGNOSIS — R10.2 PELVIC PAIN: ICD-10-CM

## 2022-08-08 DIAGNOSIS — N94.10 DYSPAREUNIA, FEMALE: Primary | ICD-10-CM

## 2022-08-08 DIAGNOSIS — M62.838 MUSCLE SPASM: ICD-10-CM

## 2022-08-08 PROCEDURE — 97140 MANUAL THERAPY 1/> REGIONS: CPT | Performed by: PHYSICAL THERAPIST

## 2022-08-10 NOTE — PROGRESS NOTES
Physical Therapy Daily Treatment Note  Patient: Halle Rea   : 1975  Diagnosis/ICD-10 Code:  Dyspareunia, female [N94.10]  Referring practitioner: JOSE RAFAEL Saldana  Date of Initial Visit: Type: THERAPY  Noted: 2021  Today's Date: 8/10/2022  Patient seen for 28 sessions      Subjective   Halle Rea reports she was hospitalized with kidney infection. She has been given antibiotics and has follow up appointment with [facility].   Pain Rating (0-10): 0      Objective   verbal consent obtained for external pelvic exam/treatment with declined need for second person in room     See Exercise, Manual, and Modality Logs for complete treatment.       Assessment/Plan  Pt tolerated manual with mild discomfort. External only due to ongoing bladder/kidney infection. Will resume internal as able to decrease pelvic pain symptoms.     Progress per Plan of Care         1648/1728   Timed:         Manual Therapy:    38     mins  01649;     Therapeutic Exercise:    0     mins  80455;     Neuromuscular Kathryn:    0    mins  81154;    Therapeutic Activity:     0     mins  15878;     Gait Trainin     mins  17167;     Ultrasound:     0     mins  50919;    Ionto                               0    mins   24741  Self Care                       0     mins   12296  Canalith Repos               0    mins  82946    Un-Timed:  Electrical Stimulation:    0     mins  62407 ( );  Dry Needling     0     mins self-pay  Traction     0     mins 03336  Low Eval     0     Mins  63815  Mod Eval     0     Mins  96283  High Eval                       0     Mins  27213  Re-Eval                           0    mins  09383    Timed Treatment:   38   mins   Total Treatment:     38   mins    GÓMEZ Renee License # [id number]  Physical Therapist

## 2022-09-02 ENCOUNTER — TREATMENT (OUTPATIENT)
Dept: PHYSICAL THERAPY | Facility: CLINIC | Age: 47
End: 2022-09-02

## 2022-09-02 DIAGNOSIS — M62.838 MUSCLE SPASM: ICD-10-CM

## 2022-09-02 DIAGNOSIS — N94.10 DYSPAREUNIA, FEMALE: Primary | ICD-10-CM

## 2022-09-02 DIAGNOSIS — R10.2 PELVIC PAIN: ICD-10-CM

## 2022-09-02 PROCEDURE — 97140 MANUAL THERAPY 1/> REGIONS: CPT | Performed by: PHYSICAL THERAPIST

## 2022-09-02 NOTE — PROGRESS NOTES
Re-Assessment / Re-Certification        Patient: Halle Rea   : 1975  Diagnosis/ICD-10 Code:  Dyspareunia, female [N94.10]  Referring practitioner: No ref. provider found  Date of Initial Visit: Type: THERAPY  Noted: 2021  Today's Date: 2022  Patient seen for 29 sessions      Subjective:   Halle Rea reports: not on antibiotics but thinks low grade infection with burning, urgency, frequency. States going to [facility] in October. GFR low. Pain: 5/10. More back pain than abdominal. Not having bladder spasms.   Not having to take Oxybutinine.     Subjective Questionnaire: VQ  Clinical Progress: worsened  Home Program Compliance: Yes  Treatment has included: therapeutic exercise, neuromuscular re-education, manual therapy, therapeutic activity and moist heat        Objective   verbal consent obtained for external pelvic exam/treatment with declined need for second person in room      Supine:   Iliopsoas, TrA, rectus abd- mild tension B      Assessment/Plan  Pt previously with significant improvement in bladder symptoms and pain but currently has ongoing kidney infection which has exacerbated symptoms. She continues to have minimal bladder spasms or need for medication even with infection. She has met 3/3 short term goals and 3/5 long term goals. She will benefit from continued skilled physical therapy to address remaining impairments and functional limitations.     Progress toward previous goals: Partially Met    STG's: 4 weeks  · Patient will report > 25% reduction in overall pain - met  · Patient will report > 25% improvement in urinary symptoms - met  · Patient will be able to perform HEP with minimal verbal cues - met     LTG's: By discharge  · Patient will report a decrease in pain to < 1/10 with urination - met  · Patient will report >75% improvement in urinary symptoms -   · Patient will report an elimination of urinary urgency - met  · Patient will be able  to tolerate an internal pelvic exam/vaginal penetration with pain <1/10 - met  · Patient will be independent with HEP        Recommendations: Continue as planned  Timeframe: 6 weeks  Prognosis to achieve goals: fair    PT Signature: Surinder Vines PT      Based upon review of the patient's progress and continued therapy plan, it is my medical opinion that Halle Rea should continue physical therapy treatment at [facility]  [address] [place] [address] [phone].    Signature: __________________________________    PHYSICIAN:   NPI:                                         [id number]  Manual Therapy:    38     mins  27105;  Therapeutic Exercise:    0     mins  03059;     Neuromuscular Kathryn:    0    mins  80404;    Therapeutic Activity:     5     mins  98927;     Gait Trainin     mins  43251;     Ultrasound:     0     mins  15583;    Electrical Stimulation:    0     mins  17775 ( );  Dry Needling     0     mins self-pay    Timed Treatment:   43   mins   Total Treatment:     43   mins

## 2022-09-20 NOTE — PROGRESS NOTES
" [facility]  [address], [address]  [place], [place], [address] [phone]  [email or web address].[facility]           OUTPATIENT CLINIC CONSULTATION NOTE    Patient care team:  Patient Care Team:  Gautam Lloyd MD as PCP - General (Family Medicine)    Requesting Provider and Reason for consultation: The patient is being seen today at the request of Gautam Lloyd MD for palpitations.     Subjective:   Chief complaint:   Chief Complaint   Patient presents with   • Rapid Heart Rate     consult   • Edema   • Chest Pain       HPI:    Halle Rea is a 47 y.o. female.  Cardiac focused problem list:  1. Palpitations  a. Started at the time of her recurrent UTI/Pyelo  b. Possibly exacerbated since COVID, 2/2022  2. Hypertension   3. Hyperlipidemia   4. Asthma   5. Pyelonephritis   6. History of kidney stones   7. Recurrent complicated UTI's   a. Previously followed by ID at [facility] (last seen in 2020)  8. Ovarian cyst  9. Anxiety   10. Migraines   11. Vitamin D deficiency     Patient presents today for consultation.     Patient with recurrent UTIs and pyelonephritis requiring multiple hospitalizations over the last 2 years.  Has had some palpitations, fatigue and dyspnea on exertion over the last 2 years but particularly worsened since having COVID in February 2022.  Notes she has \"not felt right\" since having COVID.  Feels that her heart does not beat normally and occasionally has a tightness or heaviness in the chest.  This comes and goes but occurs almost daily.  She has not been able to go back to work full-time since prior to UTIs and pyelonephritis.  Follows with infectious disease and nephrology at [facility].    No significant family history of coronary artery disease.  Denies tobacco, EtOH or drug use.  No prior cardiac testing.  Patient was started on lisinopril for her blood pressure after returning from a trip to [place] this spring.  Notes she " "did not feel well on the trip and was unable to go to the [place] every day she was there due to fatigue and palpitations.  She denies exertional chest pain, lower extremity edema, lightheadedness or syncope.    Review of Systems:  As noted above in the HPI    PFSH:  Patient Active Problem List   Diagnosis   • Ovarian cyst   • Cyst of left ovary   • Periodic headache syndrome, not intractable   • Urinary tract infection   • UTI (urinary tract infection)   • Essential hypertension   • Mixed hyperlipidemia         Current Outpatient Medications:   •  cephalexin (KEFLEX) 500 MG capsule, Take 500 mg by mouth 2 (Two) Times a Day., Disp: , Rfl:   •  lisinopril (PRINIVIL,ZESTRIL) 20 MG tablet, Take 20 mg by mouth Daily., Disp: , Rfl:   •  propranolol (INDERAL) 20 MG tablet, Take 20 mg by mouth As Needed., Disp: , Rfl:   •  vitamin D (ERGOCALCIFEROL) 1.25 MG (78742 UT) capsule capsule, Take 50,000 Units by mouth 1 (One) Time Per Week., Disp: , Rfl:     Allergies   Allergen Reactions   • Song Flavor Anaphylaxis   • Sulfa Antibiotics Other (See Comments)     Loses voice reaction as child       Social History     Socioeconomic History   • Marital status:    Tobacco Use   • Smoking status: Never Smoker   • Smokeless tobacco: Never Used   Vaping Use   • Vaping Use: Never used   Substance and Sexual Activity   • Alcohol use: Not Currently     Alcohol/week: 2.0 standard drinks     Types: 1 Glasses of wine, 1 Cans of beer per week     Comment: usually one per month   • Drug use: No   • Sexual activity: Yes     Partners: Male     Family History   Problem Relation Age of Onset   • Hypothyroidism Mother    • Dementia Mother    • Cancer Father    • Hypertension Father    • Hypothyroidism Sister    • Breast cancer Neg Hx    • Ovarian cancer Neg Hx          Objective:   Physical Exam:  /80 (BP Location: Left arm, Patient Position: Sitting)   Pulse 108   Ht 170.2 cm (67\")   Wt 87 kg (191 lb 14.4 oz)   SpO2 98%   BMI 30.06 " kg/m²   CONSTITUTIONAL: No acute distress  RESPIRATORY: Normal effort. Clear to auscultation bilaterally without wheezing or rales  CARDIOVASCULAR: Regular rate and rhythm with normal S1 and S2. Without murmur.  PERIPHERAL VASCULAR: Normal radial pulse. There is no lower extremity edema bilaterally.       Labs:  Labs reviewed by myself  BUN   Date Value Ref Range Status   07/28/2022 8 6 - 20 mg/dL Final   08/03/2020 10 7 - 21 mg/dL Final     Creatinine   Date Value Ref Range Status   07/28/2022 0.90 0.57 - 1.00 mg/dL Final   08/03/2020 0.93 0.58 - 0.96 mg/dL Final     Potassium   Date Value Ref Range Status   07/28/2022 4.0 3.5 - 5.2 mmol/L Final     Comment:     Slight hemolysis detected by analyzer. Results may be affected.   08/03/2020 4.6 3.7 - 5.1 mmol/L Final     ALT (SGPT)   Date Value Ref Range Status   07/27/2022 13 1 - 33 U/L Final   08/03/2020 13 7 - 38 U/L Final     AST (SGOT)   Date Value Ref Range Status   07/27/2022 14 1 - 32 U/L Final   08/03/2020 14 13 - 35 U/L Final       No results found for: CHOL  No results found for: TRIG  No results found for: HDL  No results found for: LDL  No components found for: LDLDIRECTC    Outside facility labs 7/27/2022:  WBC 10.4, hgb 14.0, hct 40.8, plt 336, K 4.2, BUN 11, creatinine 1.19, eGFR 49    Diagnostic Data:      ECG 12 Lead    Date/Time: 9/21/2022 2:33 PM  Performed by: Johnathan Zamora MD  Authorized by: Johnathan Zamora MD   Comparison: not compared with previous ECG   Previous ECG: no previous ECG available  Rhythm: sinus tachycardia  Rate: tachycardic  BPM: 101  Conduction: conduction normal  Comments: Non-specific T wave abnormality                Assessment and Plan:     Palpitations  Dyspnea on exertion  Fatigue, unspecified type  -Symptoms began after recurrent UTI and pyelonephritis.  Have worsened since having COVID in February 2022.   -Etiology of symptoms is uncertain.  May be multifactorial in nature with some component of long-haul COVID,  recurrent UTI and pyelonephritis.  The patient's tachycardia is suspected to be reactive in nature.  Possibly an inappropriate sinus tachycardia.  -Recommend 14 day Zio heart monitor to assess for arrhythmia.   -Echocardiogram to rule out structural or functional abnormalities.   -Additional lab work to rule out thyroid dysfunction, myocarditis, active congestive heart failure  -Depending on results of above testing, will plan tentatively stop lisinopril and start bisoprolol 5 mg daily.  May need titration of bisoprolol or radiating some degree of lisinopril thereafter.  Patient is not on lisinopril for kidney related issues.  It was started, in our nursing, just for hypertension    Essential hypertension  -Blood pressure stable at home but low normal today.   -Continue lisinopril for now; potential decreased and the addition of bisoprolol as noted above    Mixed hyperlipidemia  -Followed by PCP    - Return in about 3 months (around 12/21/2022) for Next scheduled follow up.    Scribed for Johnathan Zamora MD by JOSE RAFAEL Jade. 9/21/2022  13:54 EDT    I, Johnathan Zamora MD, personally performed the services as scribed by the above named individual. I have made any necessary edits and it is both accurate and complete.     Johnathan Zamora MD, MSc, FACC, [facility]  Interventional Cardiology  [facility]

## 2022-09-21 ENCOUNTER — OFFICE VISIT (OUTPATIENT)
Dept: CARDIOLOGY | Facility: CLINIC | Age: 47
End: 2022-09-21

## 2022-09-21 VITALS
SYSTOLIC BLOOD PRESSURE: 102 MMHG | OXYGEN SATURATION: 98 % | WEIGHT: 191.9 LBS | DIASTOLIC BLOOD PRESSURE: 80 MMHG | HEIGHT: 67 IN | HEART RATE: 108 BPM | BODY MASS INDEX: 30.12 KG/M2

## 2022-09-21 DIAGNOSIS — R53.83 FATIGUE, UNSPECIFIED TYPE: ICD-10-CM

## 2022-09-21 DIAGNOSIS — E78.2 MIXED HYPERLIPIDEMIA: ICD-10-CM

## 2022-09-21 DIAGNOSIS — R06.09 DYSPNEA ON EXERTION: ICD-10-CM

## 2022-09-21 DIAGNOSIS — R00.2 PALPITATIONS: Primary | ICD-10-CM

## 2022-09-21 DIAGNOSIS — I10 ESSENTIAL HYPERTENSION: ICD-10-CM

## 2022-09-21 PROCEDURE — 99244 OFF/OP CNSLTJ NEW/EST MOD 40: CPT | Performed by: INTERNAL MEDICINE

## 2022-09-21 PROCEDURE — 93000 ELECTROCARDIOGRAM COMPLETE: CPT | Performed by: INTERNAL MEDICINE

## 2022-09-21 RX ORDER — PROPRANOLOL HYDROCHLORIDE 20 MG/1
20 TABLET ORAL AS NEEDED
COMMUNITY

## 2022-09-21 RX ORDER — ERGOCALCIFEROL 1.25 MG/1
50000 CAPSULE ORAL WEEKLY
COMMUNITY
End: 2022-11-04 | Stop reason: ALTCHOICE

## 2022-09-21 RX ORDER — CEPHALEXIN 500 MG/1
500 CAPSULE ORAL 2 TIMES DAILY
COMMUNITY
End: 2022-10-04

## 2022-09-26 ENCOUNTER — LAB (OUTPATIENT)
Dept: LAB | Facility: HOSPITAL | Age: 47
End: 2022-09-26

## 2022-09-26 DIAGNOSIS — R06.09 DYSPNEA ON EXERTION: ICD-10-CM

## 2022-09-26 DIAGNOSIS — R00.2 PALPITATIONS: ICD-10-CM

## 2022-09-26 DIAGNOSIS — R53.83 FATIGUE, UNSPECIFIED TYPE: ICD-10-CM

## 2022-09-26 LAB — ERYTHROCYTE [SEDIMENTATION RATE] IN BLOOD: 14 MM/HR (ref 0–20)

## 2022-09-26 PROCEDURE — 36415 COLL VENOUS BLD VENIPUNCTURE: CPT

## 2022-09-26 PROCEDURE — 85652 RBC SED RATE AUTOMATED: CPT

## 2022-09-26 PROCEDURE — 86141 C-REACTIVE PROTEIN HS: CPT

## 2022-09-26 PROCEDURE — 83880 ASSAY OF NATRIURETIC PEPTIDE: CPT

## 2022-09-26 PROCEDURE — 84443 ASSAY THYROID STIM HORMONE: CPT

## 2022-09-27 LAB
CRP SERPL-MCNC: 1.32 MG/DL (ref 0.01–0.5)
NT-PROBNP SERPL-MCNC: 21.9 PG/ML (ref 0–450)
TSH SERPL DL<=0.05 MIU/L-ACNC: 1.39 UIU/ML (ref 0.27–4.2)

## 2022-09-28 ENCOUNTER — APPOINTMENT (OUTPATIENT)
Dept: CT IMAGING | Facility: HOSPITAL | Age: 47
End: 2022-09-28

## 2022-09-28 ENCOUNTER — HOSPITAL ENCOUNTER (EMERGENCY)
Facility: HOSPITAL | Age: 47
Discharge: HOME | End: 2022-09-28
Attending: EMERGENCY MEDICINE | Admitting: EMERGENCY MEDICINE

## 2022-09-28 VITALS
HEIGHT: 67 IN | OXYGEN SATURATION: 99 % | SYSTOLIC BLOOD PRESSURE: 101 MMHG | HEART RATE: 81 BPM | BODY MASS INDEX: 29.03 KG/M2 | RESPIRATION RATE: 16 BRPM | WEIGHT: 185 LBS | TEMPERATURE: 98.3 F | DIASTOLIC BLOOD PRESSURE: 66 MMHG

## 2022-09-28 DIAGNOSIS — R53.81 MALAISE AND FATIGUE: ICD-10-CM

## 2022-09-28 DIAGNOSIS — R10.9 LEFT FLANK PAIN: Primary | ICD-10-CM

## 2022-09-28 DIAGNOSIS — R53.83 MALAISE AND FATIGUE: ICD-10-CM

## 2022-09-28 LAB
ALBUMIN SERPL-MCNC: 3.9 G/DL (ref 3.5–5.2)
ALBUMIN/GLOB SERPL: 1.2 G/DL
ALP SERPL-CCNC: 105 U/L (ref 39–117)
ALT SERPL W P-5'-P-CCNC: 164 U/L (ref 1–33)
ANION GAP SERPL CALCULATED.3IONS-SCNC: 7 MMOL/L (ref 5–15)
AST SERPL-CCNC: 127 U/L (ref 1–32)
B-HCG UR QL: NEGATIVE
BASOPHILS # BLD MANUAL: 0.1 10*3/MM3 (ref 0–0.2)
BASOPHILS NFR BLD MANUAL: 1 % (ref 0–1.5)
BILIRUB SERPL-MCNC: 0.8 MG/DL (ref 0–1.2)
BILIRUB UR QL STRIP: NEGATIVE
BUN SERPL-MCNC: 8 MG/DL (ref 6–20)
BUN/CREAT SERPL: 9.3 (ref 7–25)
CALCIUM SPEC-SCNC: 9.1 MG/DL (ref 8.6–10.5)
CHLORIDE SERPL-SCNC: 105 MMOL/L (ref 98–107)
CLARITY UR: CLEAR
CO2 SERPL-SCNC: 27 MMOL/L (ref 22–29)
COLOR UR: YELLOW
CREAT SERPL-MCNC: 0.86 MG/DL (ref 0.57–1)
DEPRECATED RDW RBC AUTO: 45.9 FL (ref 37–54)
EGFRCR SERPLBLD CKD-EPI 2021: 84.5 ML/MIN/1.73
EOSINOPHIL # BLD MANUAL: 0 10*3/MM3 (ref 0–0.4)
EOSINOPHIL NFR BLD MANUAL: 0 % (ref 0.3–6.2)
ERYTHROCYTE [DISTWIDTH] IN BLOOD BY AUTOMATED COUNT: 14.2 % (ref 12.3–15.4)
FLUAV RNA RESP QL NAA+PROBE: NOT DETECTED
FLUBV RNA RESP QL NAA+PROBE: NOT DETECTED
GLOBULIN UR ELPH-MCNC: 3.2 GM/DL
GLUCOSE SERPL-MCNC: 102 MG/DL (ref 65–99)
GLUCOSE UR STRIP-MCNC: NEGATIVE MG/DL
HCT VFR BLD AUTO: 36.6 % (ref 34–46.6)
HGB BLD-MCNC: 12.1 G/DL (ref 12–15.9)
HGB UR QL STRIP.AUTO: NEGATIVE
HOLD SPECIMEN: NORMAL
KETONES UR QL STRIP: NEGATIVE
LEUKOCYTE ESTERASE UR QL STRIP.AUTO: NEGATIVE
LIPASE SERPL-CCNC: 31 U/L (ref 13–60)
LYMPHOCYTES # BLD MANUAL: 7.62 10*3/MM3 (ref 0.7–3.1)
LYMPHOCYTES NFR BLD MANUAL: 3 % (ref 5–12)
MAGNESIUM SERPL-MCNC: 2.5 MG/DL (ref 1.6–2.6)
MCH RBC QN AUTO: 29.7 PG (ref 26.6–33)
MCHC RBC AUTO-ENTMCNC: 33.1 G/DL (ref 31.5–35.7)
MCV RBC AUTO: 89.7 FL (ref 79–97)
MONOCYTES # BLD: 0.31 10*3/MM3 (ref 0.1–0.9)
NEUTROPHILS # BLD AUTO: 2.4 10*3/MM3 (ref 1.7–7)
NEUTROPHILS NFR BLD MANUAL: 20 % (ref 42.7–76)
NEUTS BAND NFR BLD MANUAL: 3 % (ref 0–5)
NITRITE UR QL STRIP: NEGATIVE
PH UR STRIP.AUTO: 6 [PH] (ref 5–8)
PLAT MORPH BLD: NORMAL
PLATELET # BLD AUTO: 237 10*3/MM3 (ref 140–450)
PMV BLD AUTO: 10.8 FL (ref 6–12)
POLYCHROMASIA BLD QL SMEAR: ABNORMAL
POTASSIUM SERPL-SCNC: 4.3 MMOL/L (ref 3.5–5.2)
PROT SERPL-MCNC: 7.1 G/DL (ref 6–8.5)
PROT UR QL STRIP: NEGATIVE
QT INTERVAL: 356 MS
QTC INTERVAL: 428 MS
RBC # BLD AUTO: 4.08 10*6/MM3 (ref 3.77–5.28)
SARS-COV-2 RNA RESP QL NAA+PROBE: NOT DETECTED
SMUDGE CELLS BLD QL SMEAR: ABNORMAL
SODIUM SERPL-SCNC: 139 MMOL/L (ref 136–145)
SP GR UR STRIP: 1.02 (ref 1–1.03)
UROBILINOGEN UR QL STRIP: NORMAL
VARIANT LYMPHS NFR BLD MANUAL: 32 % (ref 19.6–45.3)
VARIANT LYMPHS NFR BLD MANUAL: 41 % (ref 0–5)
WBC NRBC COR # BLD: 10.44 10*3/MM3 (ref 3.4–10.8)
WHOLE BLOOD HOLD COAG: NORMAL
WHOLE BLOOD HOLD SPECIMEN: NORMAL

## 2022-09-28 PROCEDURE — 81025 URINE PREGNANCY TEST: CPT | Performed by: EMERGENCY MEDICINE

## 2022-09-28 PROCEDURE — 80053 COMPREHEN METABOLIC PANEL: CPT | Performed by: EMERGENCY MEDICINE

## 2022-09-28 PROCEDURE — 99283 EMERGENCY DEPT VISIT LOW MDM: CPT

## 2022-09-28 PROCEDURE — 85007 BL SMEAR W/DIFF WBC COUNT: CPT | Performed by: EMERGENCY MEDICINE

## 2022-09-28 PROCEDURE — 85025 COMPLETE CBC W/AUTO DIFF WBC: CPT | Performed by: EMERGENCY MEDICINE

## 2022-09-28 PROCEDURE — 87636 SARSCOV2 & INF A&B AMP PRB: CPT | Performed by: EMERGENCY MEDICINE

## 2022-09-28 PROCEDURE — 74176 CT ABD & PELVIS W/O CONTRAST: CPT

## 2022-09-28 PROCEDURE — 85060 BLOOD SMEAR INTERPRETATION: CPT | Performed by: EMERGENCY MEDICINE

## 2022-09-28 PROCEDURE — 83690 ASSAY OF LIPASE: CPT | Performed by: EMERGENCY MEDICINE

## 2022-09-28 PROCEDURE — 81003 URINALYSIS AUTO W/O SCOPE: CPT | Performed by: EMERGENCY MEDICINE

## 2022-09-28 PROCEDURE — 93005 ELECTROCARDIOGRAM TRACING: CPT | Performed by: EMERGENCY MEDICINE

## 2022-09-28 PROCEDURE — 83735 ASSAY OF MAGNESIUM: CPT | Performed by: EMERGENCY MEDICINE

## 2022-09-28 RX ORDER — SODIUM CHLORIDE 9 MG/ML
10 INJECTION INTRAVENOUS AS NEEDED
Status: DISCONTINUED | OUTPATIENT
Start: 2022-09-28 | End: 2022-09-28 | Stop reason: HOSPADM

## 2022-09-29 LAB
CYTOLOGIST CVX/VAG CYTO: NORMAL
PATH INTERP BLD-IMP: NORMAL

## 2022-10-03 ENCOUNTER — HOSPITAL ENCOUNTER (OUTPATIENT)
Dept: CARDIOLOGY | Facility: HOSPITAL | Age: 47
Discharge: HOME | End: 2022-10-03
Admitting: HOSPITALIST

## 2022-10-03 VITALS — BODY MASS INDEX: 29.03 KG/M2 | WEIGHT: 185 LBS | HEIGHT: 67 IN

## 2022-10-03 DIAGNOSIS — R00.2 PALPITATIONS: ICD-10-CM

## 2022-10-03 LAB
ASCENDING AORTA: 2.6 CM
BH CV ECHO LEFT VENTRICLE GLOBAL LONGITUDINAL STRAIN: -20.6 %
BH CV ECHO MEAS - AO MAX PG: 15.2 MMHG
BH CV ECHO MEAS - AO MEAN PG: 8 MMHG
BH CV ECHO MEAS - AO ROOT AREA (BSA CORRECTED): 1.3 CM2
BH CV ECHO MEAS - AO ROOT DIAM: 2.5 CM
BH CV ECHO MEAS - AO V2 MAX: 195 CM/SEC
BH CV ECHO MEAS - AO V2 VTI: 39.3 CM
BH CV ECHO MEAS - AVA(I,D): 1.86 CM2
BH CV ECHO MEAS - EDV(CUBED): 54.9 ML
BH CV ECHO MEAS - EDV(MOD-SP2): 91.5 ML
BH CV ECHO MEAS - EDV(MOD-SP4): 129 ML
BH CV ECHO MEAS - EF(MOD-BP): 56.7 %
BH CV ECHO MEAS - EF(MOD-SP2): 56.5 %
BH CV ECHO MEAS - EF(MOD-SP4): 58.2 %
BH CV ECHO MEAS - ESV(CUBED): 17.6 ML
BH CV ECHO MEAS - ESV(MOD-SP2): 39.8 ML
BH CV ECHO MEAS - ESV(MOD-SP4): 53.9 ML
BH CV ECHO MEAS - FS: 31.6 %
BH CV ECHO MEAS - IVS/LVPW: 0.9 CM
BH CV ECHO MEAS - IVSD: 0.9 CM
BH CV ECHO MEAS - LA DIMENSION: 3 CM
BH CV ECHO MEAS - LAT PEAK E' VEL: 15.2 CM/SEC
BH CV ECHO MEAS - LV DIASTOLIC VOL/BSA (35-75): 65.9 CM2
BH CV ECHO MEAS - LV MASS(C)D: 109 GRAMS
BH CV ECHO MEAS - LV MAX PG: 6.2 MMHG
BH CV ECHO MEAS - LV MEAN PG: 3 MMHG
BH CV ECHO MEAS - LV SYSTOLIC VOL/BSA (12-30): 27.6 CM2
BH CV ECHO MEAS - LV V1 MAX: 124 CM/SEC
BH CV ECHO MEAS - LV V1 VTI: 23.3 CM
BH CV ECHO MEAS - LVIDD: 3.8 CM
BH CV ECHO MEAS - LVIDS: 2.6 CM
BH CV ECHO MEAS - LVOT AREA: 3.1 CM2
BH CV ECHO MEAS - LVOT DIAM: 2 CM
BH CV ECHO MEAS - LVPWD: 1 CM
BH CV ECHO MEAS - MED PEAK E' VEL: 15.2 CM/SEC
BH CV ECHO MEAS - MV A MAX VEL: 56.1 CM/SEC
BH CV ECHO MEAS - MV DEC SLOPE: 763 CM/SEC2
BH CV ECHO MEAS - MV DEC TIME: 0.13 MSEC
BH CV ECHO MEAS - MV E MAX VEL: 91.2 CM/SEC
BH CV ECHO MEAS - MV E/A: 1.63
BH CV ECHO MEAS - MV MAX PG: 4.7 MMHG
BH CV ECHO MEAS - MV MEAN PG: 2 MMHG
BH CV ECHO MEAS - MV P1/2T: 40.3 MSEC
BH CV ECHO MEAS - MV V2 VTI: 22.7 CM
BH CV ECHO MEAS - MVA(P1/2T): 5.5 CM2
BH CV ECHO MEAS - MVA(VTI): 3.2 CM2
BH CV ECHO MEAS - PA ACC TIME: 0.13 SEC
BH CV ECHO MEAS - PA PR(ACCEL): 21.9 MMHG
BH CV ECHO MEAS - PA V2 MAX: 127 CM/SEC
BH CV ECHO MEAS - RAP SYSTOLE: 3 MMHG
BH CV ECHO MEAS - RVSP: 23 MMHG
BH CV ECHO MEAS - SI(MOD-SP2): 26.4 ML/M2
BH CV ECHO MEAS - SI(MOD-SP4): 38.4 ML/M2
BH CV ECHO MEAS - SV(LVOT): 73.2 ML
BH CV ECHO MEAS - SV(MOD-SP2): 51.7 ML
BH CV ECHO MEAS - SV(MOD-SP4): 75.1 ML
BH CV ECHO MEAS - TAPSE (>1.6): 2.33 CM
BH CV ECHO MEAS - TR MAX PG: 19.8 MMHG
BH CV ECHO MEAS - TR MAX VEL: 221 CM/SEC
BH CV ECHO MEASUREMENTS AVERAGE E/E' RATIO: 6
BH CV VAS BP LEFT ARM: NORMAL MMHG
BH CV XLRA - RV BASE: 3.2 CM
BH CV XLRA - RV LENGTH: 6.9 CM
BH CV XLRA - RV MID: 2.6 CM
BH CV XLRA - TDI S': 15.5 CM/SEC
IVRT: 90 MSEC
LEFT ATRIUM VOLUME INDEX: 21.6 ML/M2
MAXIMAL PREDICTED HEART RATE: 174 BPM
STRESS TARGET HR: 148 BPM

## 2022-10-03 PROCEDURE — 93306 TTE W/DOPPLER COMPLETE: CPT

## 2022-10-03 PROCEDURE — 93306 TTE W/DOPPLER COMPLETE: CPT | Performed by: INTERNAL MEDICINE

## 2022-10-04 ENCOUNTER — LAB (OUTPATIENT)
Dept: LAB | Facility: HOSPITAL | Age: 47
End: 2022-10-04

## 2022-10-04 ENCOUNTER — CONSULT (OUTPATIENT)
Dept: ONCOLOGY | Facility: CLINIC | Age: 47
End: 2022-10-04

## 2022-10-04 VITALS
SYSTOLIC BLOOD PRESSURE: 145 MMHG | RESPIRATION RATE: 18 BRPM | DIASTOLIC BLOOD PRESSURE: 81 MMHG | HEIGHT: 67 IN | HEART RATE: 100 BPM | TEMPERATURE: 98.3 F | BODY MASS INDEX: 29.66 KG/M2 | OXYGEN SATURATION: 100 % | WEIGHT: 189 LBS

## 2022-10-04 DIAGNOSIS — D72.820 LYMPHOCYTOSIS: ICD-10-CM

## 2022-10-04 DIAGNOSIS — R16.1 SPLENOMEGALY: ICD-10-CM

## 2022-10-04 DIAGNOSIS — D72.820 LYMPHOCYTOSIS: Primary | ICD-10-CM

## 2022-10-04 LAB
ALBUMIN SERPL-MCNC: 4.4 G/DL (ref 3.5–5.2)
ALBUMIN/GLOB SERPL: 1.2 G/DL
ALP SERPL-CCNC: 88 U/L (ref 39–117)
ALT SERPL W P-5'-P-CCNC: 103 U/L (ref 1–33)
ANION GAP SERPL CALCULATED.3IONS-SCNC: 13 MMOL/L (ref 5–15)
AST SERPL-CCNC: 58 U/L (ref 1–32)
BASOPHILS # BLD AUTO: 0.01 10*3/MM3 (ref 0–0.2)
BASOPHILS NFR BLD AUTO: 0.1 % (ref 0–1.5)
BILIRUB SERPL-MCNC: 1.1 MG/DL (ref 0–1.2)
BUN SERPL-MCNC: 7 MG/DL (ref 6–20)
BUN/CREAT SERPL: 8.2 (ref 7–25)
CALCIUM SPEC-SCNC: 9.6 MG/DL (ref 8.6–10.5)
CHLORIDE SERPL-SCNC: 102 MMOL/L (ref 98–107)
CO2 SERPL-SCNC: 23 MMOL/L (ref 22–29)
CREAT SERPL-MCNC: 0.85 MG/DL (ref 0.57–1)
DEPRECATED RDW RBC AUTO: 47.9 FL (ref 37–54)
EGFRCR SERPLBLD CKD-EPI 2021: 85.7 ML/MIN/1.73
EOSINOPHIL # BLD AUTO: 0.01 10*3/MM3 (ref 0–0.4)
EOSINOPHIL NFR BLD AUTO: 0.1 % (ref 0.3–6.2)
ERYTHROCYTE [DISTWIDTH] IN BLOOD BY AUTOMATED COUNT: 14.1 % (ref 12.3–15.4)
GLOBULIN UR ELPH-MCNC: 3.6 GM/DL
GLUCOSE SERPL-MCNC: 106 MG/DL (ref 65–99)
HCT VFR BLD AUTO: 40.5 % (ref 34–46.6)
HETEROPH AB SER QL LA: NEGATIVE
HGB BLD-MCNC: 13.1 G/DL (ref 12–15.9)
IMM GRANULOCYTES # BLD AUTO: 0.01 10*3/MM3 (ref 0–0.05)
IMM GRANULOCYTES NFR BLD AUTO: 0.1 % (ref 0–0.5)
LDH SERPL-CCNC: 322 U/L (ref 135–214)
LYMPHOCYTES # BLD AUTO: 3.2 10*3/MM3 (ref 0.7–3.1)
LYMPHOCYTES NFR BLD AUTO: 44 % (ref 19.6–45.3)
MCH RBC QN AUTO: 29.7 PG (ref 26.6–33)
MCHC RBC AUTO-ENTMCNC: 32.3 G/DL (ref 31.5–35.7)
MCV RBC AUTO: 91.8 FL (ref 79–97)
MONOCYTES # BLD AUTO: 0.32 10*3/MM3 (ref 0.1–0.9)
MONOCYTES NFR BLD AUTO: 4.4 % (ref 5–12)
NEUTROPHILS NFR BLD AUTO: 3.73 10*3/MM3 (ref 1.7–7)
NEUTROPHILS NFR BLD AUTO: 51.3 % (ref 42.7–76)
PLATELET # BLD AUTO: 322 10*3/MM3 (ref 140–450)
PMV BLD AUTO: 9.9 FL (ref 6–12)
POTASSIUM SERPL-SCNC: 4.3 MMOL/L (ref 3.5–5.2)
PROT SERPL-MCNC: 8 G/DL (ref 6–8.5)
RBC # BLD AUTO: 4.41 10*6/MM3 (ref 3.77–5.28)
SODIUM SERPL-SCNC: 138 MMOL/L (ref 136–145)
WBC NRBC COR # BLD: 7.28 10*3/MM3 (ref 3.4–10.8)

## 2022-10-04 PROCEDURE — 86757 RICKETTSIA ANTIBODY: CPT

## 2022-10-04 PROCEDURE — 36415 COLL VENOUS BLD VENIPUNCTURE: CPT

## 2022-10-04 PROCEDURE — 88184 FLOWCYTOMETRY/ TC 1 MARKER: CPT

## 2022-10-04 PROCEDURE — 88185 FLOWCYTOMETRY/TC ADD-ON: CPT

## 2022-10-04 PROCEDURE — 85025 COMPLETE CBC W/AUTO DIFF WBC: CPT

## 2022-10-04 PROCEDURE — 86038 ANTINUCLEAR ANTIBODIES: CPT

## 2022-10-04 PROCEDURE — 87799 DETECT AGENT NOS DNA QUANT: CPT

## 2022-10-04 PROCEDURE — 80053 COMPREHEN METABOLIC PANEL: CPT

## 2022-10-04 PROCEDURE — 82785 ASSAY OF IGE: CPT

## 2022-10-04 PROCEDURE — 86308 HETEROPHILE ANTIBODY SCREEN: CPT

## 2022-10-04 PROCEDURE — 99204 OFFICE O/P NEW MOD 45 MIN: CPT | Performed by: INTERNAL MEDICINE

## 2022-10-04 PROCEDURE — 82784 ASSAY IGA/IGD/IGG/IGM EACH: CPT

## 2022-10-04 PROCEDURE — 83615 LACTATE (LD) (LDH) ENZYME: CPT

## 2022-10-04 PROCEDURE — 87476 LYME DIS DNA AMP PROBE: CPT

## 2022-10-04 NOTE — PROGRESS NOTES
Ms. Randi De Jesus, your heart ultrasound was normal.  Your heart pump in normal and the valves are all normal.  Your labs overall looked ok.  We will wait for the results of your heart monitor and then reach out to you again with those results and any further recommendations.  Thank you.

## 2022-10-04 NOTE — PROGRESS NOTES
New Patient Office Visit      Date: 10/04/2022     Patient Name: Halle Rea  MRN: [id number]  : 1975  Referring Physician: Kenya Cintron    Chief Complaint: Establish care for atypical lymphocytosis    History of Present Illness: Halle Rea is a pleasant 47 y.o. female past medical history of hypertension, chronic UTI, mono, palpitations who presents today for evaluation of atypical lymphocytosis. The patient is accompanied by their  who contributes to the history of their care.  Patient was recently in the ER for back pain.  She had a CT scan which was notable for a mild splenomegaly at 13.5 cm.  On her CBC, she was noted to have an increased lymphocyte count to 7.6 with an ANC of 2.4 and a normal hemoglobin and platelet count.  Per chart review, her lymphocytosis is new and she recently had normal labs in 2022.  Peripheral smear only notable for reactive lymphocytes and no immature cells or blast.  She denies any unexplained fevers or chills.  Denies any unexplained night sweats or weight loss.  Denies any family history of leukemia or lymphoma.  She has been suffering from chronic UTIs for the past 2-3 years.  Notes that she has a history of mono when she was younger and has significant fatigue right now that is similar to her symptoms then.  States that the fatigue is severely limiting and prevents her from maintaining an active lifestyle or working as a .  Notes that this also affects her appetite most days as well    Oncology History:    Oncology/Hematology History    No history exists.       Subjective      Review of Systems:     Constitutional: Negative for fevers, chills, or weight loss  Eyes: Negative for blurred vision or discharge         Ear/Nose/Throat: Negative for difficulty swallowing, sore throat, LAD                                                       Respiratory: Negative for cough, SOA, wheezing                                                                                         Cardiovascular: Negative for chest pain or palpitations                                                                  Gastrointestinal: Negative for nausea, vomiting or diarrhea                                                                     Genitourinary: Negative for dysuria or hematuria                                                                                           Musculoskeletal: Negative for any joint pains or muscle aches                                                                        Neurologic: Negative for any weakness, headaches, dizziness                                                                         Hematologic: Negative for any easy bleeding or bruising                                                                                   Psychiatric: Negative for anxiety or depression                             Past Medical History:   Past Medical History:   Diagnosis Date   • Acid reflux    • Arrhythmia    • Asthma    • Heart murmur    • Hyperlipidemia    • Hypertension    • Positive Cedrick Cunningham ([facility]) virus antibody    • Seasonal allergies    • Sepsis (HCC)    • Urinary tract infection        Past Surgical History:   Past Surgical History:   Procedure Laterality Date   • DIAGNOSTIC LAPAROSCOPY EXPLORATORY LAPAROTOMY N/A 07/20/2016    Procedure: DIAGNOSTIC LAPAROSCOPY EXPLORATORY LAPAROTOMY;  Surgeon: Katherine Hernandez MD;  Location:  [email or web address];  Service:    • MOUTH SURGERY     • OOPHORECTOMY Left 2016   • OVARIAN CYST DRAINAGE/EXCISION N/A 07/20/2016    Procedure: DIAGNOSTIC LAPAROSCOPY, OVARIAN CYSTECTOMY POSSIBLE OOPHORECTOMY, POSSIBLE LASER;  Surgeon: Katherine Hernandez MD;  Location:  [email or web address] OR;  Service:        Family History:   Family History   Problem Relation Age of Onset   • Hypothyroidism Mother    • Dementia Mother    • Cancer Father    • Hypertension Father    • Hypothyroidism Sister    • Breast cancer Neg Hx    •  "Ovarian cancer Neg Hx        Social History:   Social History     Socioeconomic History   • Marital status:    Tobacco Use   • Smoking status: Never Smoker   • Smokeless tobacco: Never Used   Vaping Use   • Vaping Use: Never used   Substance and Sexual Activity   • Alcohol use: Not Currently     Alcohol/week: 2.0 standard drinks     Types: 1 Glasses of wine, 1 Cans of beer per week     Comment: usually one per month   • Drug use: No   • Sexual activity: Yes     Partners: Male       Medications:     Current Outpatient Medications:   •  propranolol (INDERAL) 20 MG tablet, Take 20 mg by mouth As Needed., Disp: , Rfl:   •  vitamin D (ERGOCALCIFEROL) 1.25 MG (06598 UT) capsule capsule, Take 50,000 Units by mouth 1 (One) Time Per Week., Disp: , Rfl:   •  lisinopril (PRINIVIL,ZESTRIL) 20 MG tablet, Take 20 mg by mouth Daily., Disp: , Rfl:     Allergies:   Allergies   Allergen Reactions   • Song Flavor Anaphylaxis   • Sulfa Antibiotics Other (See Comments)     Loses voice reaction as child       Objective     Physical Exam:  Vital Signs:   Vitals:    10/04/22 0933   BP: 145/81  Comment: PT hasn't taken BP med in several days.   Pulse: 100   Resp: 18   Temp: 98.3 °F (36.8 °C)   SpO2: 100%   Weight: 85.7 kg (189 lb)   Height: 170.2 cm (67\")   PainSc: 0-No pain  Comment: No new pain. Old abd pain = 6     Pain Score    10/04/22 0933   PainSc: 0-No pain  Comment: No new pain. Old abd pain = 6     ECOG Performance Status: 0 - Asymptomatic    Constitutional: NAD, ECOG 0  Eyes: PERRLA, scleral anicteric  ENT: No LAD, no thyromegaly  Respiratory: CTAB, no wheezing, rales, rhonchi  Cardiovascular: RRR, no murmurs, pulses 2+ bilaterally  Abdomen: soft, NT/ND, no HSM  Musculoskeletal: strength 5/5 bilaterally, no c/c/e  Neurologic: A&O x 3, CN II-XII intact grossly  Psych: mood and affect congruent, no SI or HI    Results Review:   Hospital Outpatient Visit on 10/03/2022   Component Date Value Ref Range Status   • Target HR " (85%) 10/03/2022 148  bpm Final   • Max. Pred. HR (100%) 10/03/2022 174  bpm Final   • BH CV VAS BP LEFT ARM 10/03/2022 127/77  mmHg Final   • RV S' 10/03/2022 15.5  cm/sec Final   • RV Base 10/03/2022 3.2  cm Final   • RV Length 10/03/2022 6.9  cm Final   • RV Mid 10/03/2022 2.6  cm Final   • Ascending aorta 10/03/2022 2.6  cm Final   • IVRT 10/03/2022 90.0  msec Final   • LA ESV Index (BP) 10/03/2022 21.6  ml/m2 Final   • Avg E/e' ratio 10/03/2022 6.00   Final   • LV GLOBAL STRAIN  10/03/2022 -20.6  % Final   • Ao root diam 10/03/2022 2.5  cm Final   • Ao pk andi 10/03/2022 195.0  cm/sec Final   • Ao V2 VTI 10/03/2022 39.3  cm Final   • NEVILLE(I,D) 10/03/2022 1.86  cm2 Final   • EDV(cubed) 10/03/2022 54.9  ml Final   • EDV(MOD-sp2) 10/03/2022 91.5  ml Final   • EDV(MOD-sp4) 10/03/2022 129.0  ml Final   • EF(MOD-bp) 10/03/2022 56.7  % Final   • EF(MOD-sp2) 10/03/2022 56.5  % Final   • EF(MOD-sp4) 10/03/2022 58.2  % Final   • ESV(cubed) 10/03/2022 17.6  ml Final   • ESV(MOD-sp2) 10/03/2022 39.8  ml Final   • ESV(MOD-sp4) 10/03/2022 53.9  ml Final   • IVS/LVPW 10/03/2022 0.90  cm Final   • Lat Peak E' Andi 10/03/2022 15.2  cm/sec Final   • LV mass(C)d 10/03/2022 109.0  grams Final   • LV V1 max PG 10/03/2022 6.2  mmHg Final   • LV V1 mean PG 10/03/2022 3.0  mmHg Final   • LV V1 max 10/03/2022 124.0  cm/sec Final   • LVPWd 10/03/2022 1.00  cm Final   • Med Peak E' Andi 10/03/2022 15.2  cm/sec Final   • MV dec slope 10/03/2022 763.0  cm/sec2 Final   • MV dec time 10/03/2022 0.13  msec Final   • MV P1/2t 10/03/2022 40.3  msec Final   • MV V2 VTI 10/03/2022 22.7  cm Final   • MVA(VTI) 10/03/2022 3.2  cm2 Final   • PA acc time 10/03/2022 0.13  sec Final   • PA pr(Accel) 10/03/2022 21.9  mmHg Final   • PA V2 max 10/03/2022 127.0  cm/sec Final   • RAP systole 10/03/2022 3  mmHg Final   • RVSP(TR) 10/03/2022 23  mmHg Final   • SI(MOD-sp2) 10/03/2022 26.4  ml/m2 Final   • SI(MOD-sp4) 10/03/2022 38.4  ml/m2 Final   • SV(LVOT)  10/03/2022 73.2  ml Final   • SV(MOD-sp2) 10/03/2022 51.7  ml Final   • SV(MOD-sp4) 10/03/2022 75.1  ml Final   • TR max PG 10/03/2022 19.8  mmHg Final   • Ao max PG 10/03/2022 15.2  mmHg Final   • Ao mean PG 10/03/2022 8.0  mmHg Final   • FS 10/03/2022 31.6  % Final   • IVSd 10/03/2022 0.90  cm Final   • LA dimension (2D)  10/03/2022 3.0  cm Final   • LV V1 VTI 10/03/2022 23.3  cm Final   • LVIDd 10/03/2022 3.8  cm Final   • LVIDs 10/03/2022 2.6  cm Final   • LVOT area 10/03/2022 3.1  cm2 Final   • LVOT diam 10/03/2022 2.00  cm Final   • MV E/A 10/03/2022 1.63   Final   • MV max PG 10/03/2022 4.7  mmHg Final   • MV mean PG 10/03/2022 2.00  mmHg Final   • MVA(P1/2t) 10/03/2022 5.5  cm2 Final   • MV A max demian 10/03/2022 56.1  cm/sec Final   • MV E max demian 10/03/2022 91.2  cm/sec Final   • TR max demian 10/03/2022 221.0  cm/sec Final   • Ao root area (BSA corrected) 10/03/2022 1.3  cm2 Final   • LV Tomlin Vol (BSA corrected) 10/03/2022 65.9  cm2 Final   • LV Sys Vol (BSA corrected) 10/03/2022 27.6  cm2 Final   • TAPSE (>1.6) 10/03/2022 2.33  cm Final   Admission on 09/28/2022, Discharged on 09/28/2022   Component Date Value Ref Range Status   • Glucose 09/28/2022 102 (A) 65 - 99 mg/dL Final   • BUN 09/28/2022 8  6 - 20 mg/dL Final   • Creatinine 09/28/2022 0.86  0.57 - 1.00 mg/dL Final   • Sodium 09/28/2022 139  136 - 145 mmol/L Final   • Potassium 09/28/2022 4.3  3.5 - 5.2 mmol/L Final   • Chloride 09/28/2022 105  98 - 107 mmol/L Final   • CO2 09/28/2022 27.0  22.0 - 29.0 mmol/L Final   • Calcium 09/28/2022 9.1  8.6 - 10.5 mg/dL Final   • Total Protein 09/28/2022 7.1  6.0 - 8.5 g/dL Final   • Albumin 09/28/2022 3.90  3.50 - 5.20 g/dL Final   • ALT (SGPT) 09/28/2022 164 (A) 1 - 33 U/L Final   • AST (SGOT) 09/28/2022 127 (A) 1 - 32 U/L Final   • Alkaline Phosphatase 09/28/2022 105  39 - 117 U/L Final   • Total Bilirubin 09/28/2022 0.8  0.0 - 1.2 mg/dL Final   • Globulin 09/28/2022 3.2  gm/dL Final    Calculated Result   •  A/G Ratio 09/28/2022 1.2  g/dL Final   • BUN/Creatinine Ratio 09/28/2022 9.3  7.0 - 25.0 Final   • Anion Gap 09/28/2022 7.0  5.0 - 15.0 mmol/L Final   • eGFR 09/28/2022 84.5  >60.0 mL/min/1.73 Final    National Kidney Foundation and American Society of Nephrology (ASN) Task Force recommended calculation based on the Chronic Kidney Disease Epidemiology Collaboration (CKD-EPI) equation refit without adjustment for race.   • Lipase 09/28/2022 31  13 - 60 U/L Final   • Extra Tube 09/28/2022 Hold for add-ons.   Final    Auto resulted.   • Extra Tube 09/28/2022 hold for add-on   Final    Auto resulted   • Extra Tube 09/28/2022 Hold for add-ons.   Final    Auto resulted.   • Extra Tube 09/28/2022 Hold for add-ons.   Final    Auto resulted.   • Extra Tube 09/28/2022 Hold for add-ons.   Final    Auto resulted   • WBC 09/28/2022 10.44  3.40 - 10.80 10*3/mm3 Final   • RBC 09/28/2022 4.08  3.77 - 5.28 10*6/mm3 Final   • Hemoglobin 09/28/2022 12.1  12.0 - 15.9 g/dL Final   • Hematocrit 09/28/2022 36.6  34.0 - 46.6 % Final   • MCV 09/28/2022 89.7  79.0 - 97.0 fL Final   • MCH 09/28/2022 29.7  26.6 - 33.0 pg Final   • MCHC 09/28/2022 33.1  31.5 - 35.7 g/dL Final   • RDW 09/28/2022 14.2  12.3 - 15.4 % Final   • RDW-SD 09/28/2022 45.9  37.0 - 54.0 fl Final   • MPV 09/28/2022 10.8  6.0 - 12.0 fL Final   • Platelets 09/28/2022 237  140 - 450 10*3/mm3 Final   • Color, UA 09/28/2022 Yellow  Yellow, Straw Final   • Appearance, UA 09/28/2022 Clear  Clear Final   • pH, UA 09/28/2022 6.0  5.0 - 8.0 Final   • Specific Gravity, UA 09/28/2022 1.018  1.001 - 1.030 Final   • Glucose, UA 09/28/2022 Negative  Negative Final   • Ketones, UA 09/28/2022 Negative  Negative Final   • Bilirubin, UA 09/28/2022 Negative  Negative Final   • Blood, UA 09/28/2022 Negative  Negative Final   • Protein, UA 09/28/2022 Negative  Negative Final   • Leuk Esterase, UA 09/28/2022 Negative  Negative Final   • Nitrite, UA 09/28/2022 Negative  Negative Final   •  Urobilinogen, UA 09/28/2022 0.2 E.U./dL  0.2 - 1.0 E.U./dL Final   • Magnesium 09/28/2022 2.5  1.6 - 2.6 mg/dL Final   • QT Interval 09/28/2022 356  ms Final   • QTC Interval 09/28/2022 428  ms Final   • COVID19 09/28/2022 Not Detected  Not Detected - Ref. Range Final   • Influenza A PCR 09/28/2022 Not Detected  Not Detected Final   • Influenza B PCR 09/28/2022 Not Detected  Not Detected Final   • HCG, Urine QL 09/28/2022 Negative  Negative Final   • Neutrophil % 09/28/2022 20.0 (A) 42.7 - 76.0 % Final   • Lymphocyte % 09/28/2022 32.0  19.6 - 45.3 % Final   • Monocyte % 09/28/2022 3.0 (A) 5.0 - 12.0 % Final   • Eosinophil % 09/28/2022 0.0 (A) 0.3 - 6.2 % Final   • Basophil % 09/28/2022 1.0  0.0 - 1.5 % Final   • Bands %  09/28/2022 3.0  0.0 - 5.0 % Final   • Atypical Lymphocyte % 09/28/2022 41.0 (A) 0.0 - 5.0 % Final   • Neutrophils Absolute 09/28/2022 2.40  1.70 - 7.00 10*3/mm3 Final   • Lymphocytes Absolute 09/28/2022 7.62 (A) 0.70 - 3.10 10*3/mm3 Final   • Monocytes Absolute 09/28/2022 0.31  0.10 - 0.90 10*3/mm3 Final   • Eosinophils Absolute 09/28/2022 0.00  0.00 - 0.40 10*3/mm3 Final   • Basophils Absolute 09/28/2022 0.10  0.00 - 0.20 10*3/mm3 Final   • Polychromasia 09/28/2022 Slight/1+  None Seen Final   • Smudge Cells 09/28/2022 Large/3+  None Seen Final   • Platelet Morphology 09/28/2022 Normal  Normal Final   • Performed by: 09/28/2022 Morris Alvarez MD   Final   • Pathologist Interpretation 09/28/2022 Normal total white blood cell count with lymphocytosis including occasional atypical (favor reactive) lymphocytes.  Normochromic normocytic red blood cells without anemia.  Platelets present in adequate numbers with normal appearance.     Final   Lab on 09/26/2022   Component Date Value Ref Range Status   • C-Reactive Protein 09/26/2022 1.322 (A) 0.010 - 0.500 mg/dL Final   • Sed Rate 09/26/2022 14  0 - 20 mm/hr Final   • proBNP 09/26/2022 21.9  0.0 - 450.0 pg/mL Final   • TSH 09/26/2022 1.390  0.270 - 4.200  uIU/mL Final       Adult Transthoracic Echo Complete W/ Cont if Necessary Per Protocol    Result Date: 10/3/2022  Narrative: · Left ventricular ejection fraction appears to be 56 - 60%. Left ventricular systolic function is normal. · The cardiac valves are structurally and functionally within normal limits.      CT Abdomen Pelvis Without Contrast    Result Date: 9/28/2022  Narrative: CT ABDOMEN PELVIS WO CONTRAST-  Date of Exam: 9/28/2022 12:09 PM  Indication: L flank pain  history of left oophorectomy.  Comparison: CT abdomen pelvis 07/27/2022  Technique: Contiguous axial CT images were obtained from the lung bases to the pubic symphysis without contrast. Sagittal and coronal reconstructions were performed.  Automated exposure control and iterative reconstruction methods were used.  FINDINGS:  Lower Thorax: Lung bases are clear.  No significant hiatal hernia.  Peritoneum: No free air or free fluid.  Appendix: Appendix is well seen and is normal.  Kidneys, ureters, and urinary bladder: No hydronephrosis.  No nephroureterolithiasis. No focal renal lesions.  Normal appearance of urinary bladder given the amount of distention.  Liver, gallbladder, and bile ducts: No focal hepatic lesions. Gallbladder and bile ducts are unremarkable.  Spleen: Interval development of splenomegaly. Spleen measures 13.3 x 5.2 cm in axial dimension. No focal splenic lesions.  Adrenal glands: Unremarkable.  Pancreas: No focal masses.  No pancreatic duct dilation.  Abdominal aorta and vascular structures: No aneurysmal dilation. No significant atherosclerotic disease.  Stomach and Bowel: No abnormally dilated loops of bowel.  No significant hiatal hernia.  No significant bowel wall thickening.  Ligament of Treitz has normal anatomic position.  Reproductive Organs: Trace fluid in the pelvis likely physiologic in amount. Normal CT appearance of the uterus and right adnexa.  Lymph nodes: No pathologically enlarged lymph nodes.  Soft tissues:  Unremarkable.  Osseous structures: No aggressive focal lytic or sclerotic osseous lesions.  Evaluation of bowel and solid organs is limited without contrast administration.      Impression: 1.  New splenomegaly. 2.  No obstructive uropathy or nephroureterolithiasis. 3.  Limited evaluation without contrast..    This report was finalized on 9/28/2022 12:24 PM by Moraima Simley MD.        Assessment / Plan      Assessment/Plan:   1. Lymphocytosis (Primary)  -Noted on recent labs on 9/28/2022 with an ALC of 7.6  -Peripheral smear without immature cells or blast  -Low concern for an acute hematologic malignancy and her atypical of cytosis is likely related to a viral infection  -We will check labs as below to rule out other secondary causes  -     CBC & Differential; Future  -     Comprehensive Metabolic Panel; Future  -     Lactate Dehydrogenase; Future  -     Flow Cytometry, Blood; Future  -     Immunoglobulins A/E/G/M Serum; Future  -     Lyme Disease, PCR - , Arm, Left; Future  -     Barlow SF (IgG / M); Future    2. Splenomegaly  -Incidentally noted on recent CT scan with a spleen of 3.5 cm  -Unlikely to be the cause of her recent back pain  -We will check labs above and below to rule out other secondary causes  -     Mononucleosis Screen; Future  -     Selena-Barr Virus DNA, Quantitative; Future  -     Nuclear Antigen Antibody, IFA; Future           Follow Up:   Follow-up in 3 weeks     Wang Castrejon MD  Hematology and Oncology     Please note that portions of this note may have been completed with a voice recognition program. Efforts were made to edit the dictations, but occasionally words are mistranscribed.

## 2022-10-05 ENCOUNTER — LAB (OUTPATIENT)
Dept: LAB | Facility: HOSPITAL | Age: 47
End: 2022-10-05

## 2022-10-05 ENCOUNTER — TRANSCRIBE ORDERS (OUTPATIENT)
Dept: LAB | Facility: HOSPITAL | Age: 47
End: 2022-10-05

## 2022-10-05 DIAGNOSIS — R74.02 NONSPECIFIC ELEVATION OF LEVELS OF TRANSAMINASE OR LACTIC ACID DEHYDROGENASE (LDH): Primary | ICD-10-CM

## 2022-10-05 DIAGNOSIS — R74.01 NONSPECIFIC ELEVATION OF LEVELS OF TRANSAMINASE OR LACTIC ACID DEHYDROGENASE (LDH): Primary | ICD-10-CM

## 2022-10-05 DIAGNOSIS — R16.1 SPLENOMEGALY: ICD-10-CM

## 2022-10-05 LAB
ALBUMIN SERPL-MCNC: 4.1 G/DL (ref 3.5–5.2)
ALP SERPL-CCNC: 75 U/L (ref 39–117)
ALT SERPL W P-5'-P-CCNC: 89 U/L (ref 1–33)
AMYLASE SERPL-CCNC: 43 U/L (ref 28–100)
ANION GAP SERPL CALCULATED.3IONS-SCNC: 6 MMOL/L (ref 5–15)
AST SERPL-CCNC: 59 U/L (ref 1–32)
BILIRUB CONJ SERPL-MCNC: 0.2 MG/DL (ref 0–0.3)
BILIRUB INDIRECT SERPL-MCNC: 0.7 MG/DL
BILIRUB SERPL-MCNC: 0.9 MG/DL (ref 0–1.2)
BUN SERPL-MCNC: 8 MG/DL (ref 6–20)
BUN/CREAT SERPL: 9 (ref 7–25)
CALCIUM SPEC-SCNC: 9.2 MG/DL (ref 8.6–10.5)
CHLORIDE SERPL-SCNC: 106 MMOL/L (ref 98–107)
CO2 SERPL-SCNC: 26 MMOL/L (ref 22–29)
CREAT SERPL-MCNC: 0.89 MG/DL (ref 0.57–1)
DEPRECATED RDW RBC AUTO: 50.7 FL (ref 37–54)
EGFRCR SERPLBLD CKD-EPI 2021: 81.1 ML/MIN/1.73
ERYTHROCYTE [DISTWIDTH] IN BLOOD BY AUTOMATED COUNT: 14.8 % (ref 12.3–15.4)
GGT SERPL-CCNC: 41 U/L (ref 5–36)
GLUCOSE SERPL-MCNC: 102 MG/DL (ref 65–99)
HCT VFR BLD AUTO: 37.4 % (ref 34–46.6)
HGB BLD-MCNC: 12 G/DL (ref 12–15.9)
LIPASE SERPL-CCNC: 34 U/L (ref 13–60)
Lab: NORMAL
MCH RBC QN AUTO: 29.9 PG (ref 26.6–33)
MCHC RBC AUTO-ENTMCNC: 32.1 G/DL (ref 31.5–35.7)
MCV RBC AUTO: 93 FL (ref 79–97)
PLATELET # BLD AUTO: 257 10*3/MM3 (ref 140–450)
PMV BLD AUTO: 11.8 FL (ref 6–12)
POTASSIUM SERPL-SCNC: 4.4 MMOL/L (ref 3.5–5.2)
PROT SERPL-MCNC: 7.3 G/DL (ref 6–8.5)
R RICKETTSI IGG SER QL IA: NEGATIVE
R RICKETTSI IGM SER-ACNC: 0.46 INDEX (ref 0–0.89)
RBC # BLD AUTO: 4.02 10*6/MM3 (ref 3.77–5.28)
SODIUM SERPL-SCNC: 138 MMOL/L (ref 136–145)
WBC NRBC COR # BLD: 6.64 10*3/MM3 (ref 3.4–10.8)

## 2022-10-05 PROCEDURE — 80076 HEPATIC FUNCTION PANEL: CPT | Performed by: FAMILY MEDICINE

## 2022-10-05 PROCEDURE — 82150 ASSAY OF AMYLASE: CPT | Performed by: FAMILY MEDICINE

## 2022-10-05 PROCEDURE — 83690 ASSAY OF LIPASE: CPT | Performed by: FAMILY MEDICINE

## 2022-10-05 PROCEDURE — 80048 BASIC METABOLIC PNL TOTAL CA: CPT | Performed by: FAMILY MEDICINE

## 2022-10-05 PROCEDURE — 82977 ASSAY OF GGT: CPT | Performed by: FAMILY MEDICINE

## 2022-10-05 PROCEDURE — 85027 COMPLETE CBC AUTOMATED: CPT | Performed by: FAMILY MEDICINE

## 2022-10-07 ENCOUNTER — TRANSCRIBE ORDERS (OUTPATIENT)
Dept: ADMINISTRATIVE | Facility: HOSPITAL | Age: 47
End: 2022-10-07

## 2022-10-07 DIAGNOSIS — R10.9 ABDOMINAL PAIN, UNSPECIFIED ABDOMINAL LOCATION: Primary | ICD-10-CM

## 2022-10-07 LAB
ANA SER QL IF: NEGATIVE
EBV DNA SERPL NAA+PROBE-ACNC: NEGATIVE IU/ML

## 2022-10-08 LAB — B BURGDOR DNA SPEC QL NAA+PROBE: NEGATIVE

## 2022-10-11 LAB
IGA SERPL-MCNC: 375 MG/DL (ref 87–352)
IGE SERPL-ACNC: 60 IU/ML (ref 6–495)
IGG SERPL-MCNC: 1130 MG/DL (ref 586–1602)
IGM SERPL-MCNC: 482 MG/DL (ref 26–217)

## 2022-10-17 ENCOUNTER — TELEPHONE (OUTPATIENT)
Dept: CARDIOLOGY | Facility: CLINIC | Age: 47
End: 2022-10-17

## 2022-10-17 NOTE — TELEPHONE ENCOUNTER
----- Message from Johnathan Zamora MD sent at 10/17/2022  1:31 PM EDT -----  Heart monitor looks okay.  No arrhythmia.    ----- Message -----  From: Johnathan Zamora MD  Sent: 10/17/2022  12:55 PM EDT  To: Johnathan Zamora MD

## 2022-10-25 ENCOUNTER — HOSPITAL ENCOUNTER (OUTPATIENT)
Dept: NUCLEAR MEDICINE | Facility: HOSPITAL | Age: 47
Discharge: HOME | End: 2022-10-25

## 2022-10-25 VITALS — BODY MASS INDEX: 28.98 KG/M2 | WEIGHT: 185 LBS

## 2022-10-25 DIAGNOSIS — R10.9 ABDOMINAL PAIN, UNSPECIFIED ABDOMINAL LOCATION: ICD-10-CM

## 2022-10-25 PROCEDURE — 0 TECHNETIUM TC 99M MEBROFENIN KIT: Performed by: FAMILY MEDICINE

## 2022-10-25 PROCEDURE — 25010000002 SINCALIDE PER 5 MCG: Performed by: FAMILY MEDICINE

## 2022-10-25 PROCEDURE — A9537 TC99M MEBROFENIN: HCPCS | Performed by: FAMILY MEDICINE

## 2022-10-25 PROCEDURE — 78227 HEPATOBIL SYST IMAGE W/DRUG: CPT

## 2022-10-25 RX ORDER — KIT FOR THE PREPARATION OF TECHNETIUM TC 99M MEBROFENIN 45 MG/10ML
1 INJECTION, POWDER, LYOPHILIZED, FOR SOLUTION INTRAVENOUS
Status: COMPLETED | OUTPATIENT
Start: 2022-10-25 | End: 2022-10-25

## 2022-10-25 RX ADMIN — MEBROFENIN 1 DOSE: 45 INJECTION, POWDER, LYOPHILIZED, FOR SOLUTION INTRAVENOUS at 09:00

## 2022-10-25 RX ADMIN — SINCALIDE 1.7 MCG: 5 INJECTION, POWDER, LYOPHILIZED, FOR SOLUTION INTRAVENOUS at 10:27

## 2022-11-04 ENCOUNTER — OFFICE VISIT (OUTPATIENT)
Dept: INTERNAL MEDICINE | Facility: CLINIC | Age: 47
End: 2022-11-04

## 2022-11-04 ENCOUNTER — LAB (OUTPATIENT)
Dept: LAB | Facility: HOSPITAL | Age: 47
End: 2022-11-04

## 2022-11-04 VITALS
HEART RATE: 68 BPM | BODY MASS INDEX: 30.28 KG/M2 | WEIGHT: 188.4 LBS | SYSTOLIC BLOOD PRESSURE: 118 MMHG | TEMPERATURE: 98 F | DIASTOLIC BLOOD PRESSURE: 80 MMHG | HEIGHT: 66 IN

## 2022-11-04 DIAGNOSIS — R53.83 FATIGUE, UNSPECIFIED TYPE: ICD-10-CM

## 2022-11-04 DIAGNOSIS — Z12.11 SCREEN FOR COLON CANCER: ICD-10-CM

## 2022-11-04 DIAGNOSIS — I10 ESSENTIAL HYPERTENSION: Chronic | ICD-10-CM

## 2022-11-04 DIAGNOSIS — M54.50 CHRONIC LEFT-SIDED LOW BACK PAIN WITHOUT SCIATICA: ICD-10-CM

## 2022-11-04 DIAGNOSIS — D72.820 LYMPHOCYTOSIS: ICD-10-CM

## 2022-11-04 DIAGNOSIS — R53.83 FATIGUE, UNSPECIFIED TYPE: Primary | ICD-10-CM

## 2022-11-04 DIAGNOSIS — G89.29 CHRONIC LEFT-SIDED LOW BACK PAIN WITHOUT SCIATICA: ICD-10-CM

## 2022-11-04 PROBLEM — F41.9 ANXIETY: Status: ACTIVE | Noted: 2022-11-04

## 2022-11-04 PROBLEM — J30.2 SEASONAL ALLERGIC RHINITIS: Status: ACTIVE | Noted: 2022-11-04

## 2022-11-04 PROBLEM — Z87.442 HISTORY OF KIDNEY STONES: Status: ACTIVE | Noted: 2022-11-04

## 2022-11-04 PROBLEM — E78.2 MIXED HYPERLIPIDEMIA: Chronic | Status: ACTIVE | Noted: 2022-09-21

## 2022-11-04 PROBLEM — IMO0001 MODERATE INTERMITTENT ASTHMA WITHOUT COMPLICATION: Status: ACTIVE | Noted: 2022-11-04

## 2022-11-04 PROBLEM — E55.9 VITAMIN D DEFICIENCY: Status: ACTIVE | Noted: 2022-11-04

## 2022-11-04 PROBLEM — R16.1 SPLENOMEGALY: Status: ACTIVE | Noted: 2022-11-04

## 2022-11-04 LAB
ALBUMIN SERPL-MCNC: 4.5 G/DL (ref 3.5–5.2)
ALBUMIN/GLOB SERPL: 1.4 G/DL
ALP SERPL-CCNC: 65 U/L (ref 39–117)
ALT SERPL W P-5'-P-CCNC: 47 U/L (ref 1–33)
ANION GAP SERPL CALCULATED.3IONS-SCNC: 10.7 MMOL/L (ref 5–15)
AST SERPL-CCNC: 34 U/L (ref 1–32)
BASOPHILS # BLD AUTO: 0.04 10*3/MM3 (ref 0–0.2)
BASOPHILS NFR BLD AUTO: 0.6 % (ref 0–1.5)
BILIRUB SERPL-MCNC: 0.8 MG/DL (ref 0–1.2)
BUN SERPL-MCNC: 10 MG/DL (ref 6–20)
BUN/CREAT SERPL: 9.3 (ref 7–25)
C3 SERPL-MCNC: 163 MG/DL (ref 82–167)
C4 SERPL-MCNC: 34 MG/DL (ref 14–44)
CALCIUM SPEC-SCNC: 9.7 MG/DL (ref 8.6–10.5)
CHLORIDE SERPL-SCNC: 103 MMOL/L (ref 98–107)
CO2 SERPL-SCNC: 27.3 MMOL/L (ref 22–29)
CREAT SERPL-MCNC: 1.08 MG/DL (ref 0.57–1)
CRP SERPL-MCNC: <0.3 MG/DL (ref 0–0.5)
DEPRECATED RDW RBC AUTO: 41.6 FL (ref 37–54)
EGFRCR SERPLBLD CKD-EPI 2021: 64.3 ML/MIN/1.73
EOSINOPHIL # BLD AUTO: 0.09 10*3/MM3 (ref 0–0.4)
EOSINOPHIL NFR BLD AUTO: 1.5 % (ref 0.3–6.2)
ERYTHROCYTE [DISTWIDTH] IN BLOOD BY AUTOMATED COUNT: 13.1 % (ref 12.3–15.4)
ERYTHROCYTE [SEDIMENTATION RATE] IN BLOOD: 11 MM/HR (ref 0–20)
GLOBULIN UR ELPH-MCNC: 3.2 GM/DL
GLUCOSE SERPL-MCNC: 79 MG/DL (ref 65–99)
HCT VFR BLD AUTO: 39.6 % (ref 34–46.6)
HGB BLD-MCNC: 13.4 G/DL (ref 12–15.9)
IMM GRANULOCYTES # BLD AUTO: 0.01 10*3/MM3 (ref 0–0.05)
IMM GRANULOCYTES NFR BLD AUTO: 0.2 % (ref 0–0.5)
LYMPHOCYTES # BLD AUTO: 2.97 10*3/MM3 (ref 0.7–3.1)
LYMPHOCYTES NFR BLD AUTO: 47.9 % (ref 19.6–45.3)
MCH RBC QN AUTO: 29.6 PG (ref 26.6–33)
MCHC RBC AUTO-ENTMCNC: 33.8 G/DL (ref 31.5–35.7)
MCV RBC AUTO: 87.4 FL (ref 79–97)
MONOCYTES # BLD AUTO: 0.38 10*3/MM3 (ref 0.1–0.9)
MONOCYTES NFR BLD AUTO: 6.1 % (ref 5–12)
NEUTROPHILS NFR BLD AUTO: 2.71 10*3/MM3 (ref 1.7–7)
NEUTROPHILS NFR BLD AUTO: 43.7 % (ref 42.7–76)
NRBC BLD AUTO-RTO: 0 /100 WBC (ref 0–0.2)
PLATELET # BLD AUTO: 339 10*3/MM3 (ref 140–450)
PMV BLD AUTO: 10.4 FL (ref 6–12)
POTASSIUM SERPL-SCNC: 4.2 MMOL/L (ref 3.5–5.2)
PROT SERPL-MCNC: 7.7 G/DL (ref 6–8.5)
RBC # BLD AUTO: 4.53 10*6/MM3 (ref 3.77–5.28)
SODIUM SERPL-SCNC: 141 MMOL/L (ref 136–145)
VIT B12 BLD-MCNC: 503 PG/ML (ref 211–946)
WBC NRBC COR # BLD: 6.2 10*3/MM3 (ref 3.4–10.8)

## 2022-11-04 PROCEDURE — 80053 COMPREHEN METABOLIC PANEL: CPT

## 2022-11-04 PROCEDURE — 82607 VITAMIN B-12: CPT

## 2022-11-04 PROCEDURE — 86160 COMPLEMENT ANTIGEN: CPT

## 2022-11-04 PROCEDURE — 86140 C-REACTIVE PROTEIN: CPT

## 2022-11-04 PROCEDURE — 85025 COMPLETE CBC W/AUTO DIFF WBC: CPT

## 2022-11-04 PROCEDURE — 99215 OFFICE O/P EST HI 40 MIN: CPT | Performed by: STUDENT IN AN ORGANIZED HEALTH CARE EDUCATION/TRAINING PROGRAM

## 2022-11-04 PROCEDURE — 85652 RBC SED RATE AUTOMATED: CPT

## 2022-11-04 RX ORDER — CYCLOBENZAPRINE HCL 5 MG
5-10 TABLET ORAL 3 TIMES DAILY PRN
Qty: 30 TABLET | Refills: 1 | Status: SHIPPED | OUTPATIENT
Start: 2022-11-04

## 2022-11-04 RX ORDER — MELATONIN
1000 EVERY OTHER DAY
COMMUNITY

## 2022-11-04 NOTE — PROGRESS NOTES
Chief Complaint  Halle Rea is a 47 y.o. female presenting for abnormal lab work  (Establish Care ).     From [facility]. Patient is . No children. They have 2 poodles. She works as a , [employer]. Per 2022 works 6h/daily.    She has a past medical history of hypertension, hyperlipidemia, asthma, anxiety, kidney stones, recurrent UTI/pyelonephritis (multiple hospitalizations, f/u ID and nephrology at [facility]), vitamin D deficiency and seasonal allergies    History of Present Illness  Patient is here to establish care.    She has a complicated medical history that started around 2019.  She got urinary tract infection with pyelonephritis at that time and had recurrent infections.  She got very ill, she also reports getting confused when she is ill.  She establish care with infectious disease and nephrology at [facility], and she still follows with them and has upcoming appointment in December.  She has had recurrent UTIs and pyelonephritis since this started.  Been on multiple antibiotics.  She was also evaluated by neurology in 2020 due to concern for cognitive issues, work-up reassuring.  She also was found to have kidney stones in the past, she did follow-up with urology.  She reports significant decline in her quality of life, she has at times not been able to keep up with her work.  Currently she works about 6 hours daily, 5 days a week, she works a lot from home as an  with [employer].    More recently she had a UTI in July, again in September.  She also was experiencing palpitations and recently saw cardiology, with reassuring work-up.  She was found to have elevated blood pressures after her kidney function dropped in relation to her pyelonephritis.  She was started on lisinopril, but did not tolerate the medication.  She was also evaluated by rheumatology in the past due to borderline positive COLETTE and facial rash, but my understanding is they did not feel  "she had any rheumatologic disease.    Currently patient reports losing about 20-30% of her hair over the last 3 months.  She has been more fatigued over the last 2 months.  She is experiencing constant left lower back pain/flank pain.  This is tender to touch.  She is also is scheduled to see surgery after recent HIDA scan, she is reporting occasional right upper quadrant discomfort.  No postprandial symptoms, this presents more when she is stressing, for instance during depositions.  Sometimes also when lifting items.  She reports nauseousness all day, which comes and goes.  Some days she wakes up feeling better, other days she feels bad.  Sometimes it is worse in the morning.    Of note patient also recently was seen in the emergency room, and they did a work-up with CT of her abdomen pelvis, and did not really find any thing concerning.  He was also recently evaluated by hematology due to elevated liver enzymes, abnormal blood count, initially she tells me her previous PCP had told her she might have leukemia, but at this time there is no concern.  She also had elevated inflammatory markers.  She is requesting repeat blood work.  Of note she also had her left ovary removed in the past due to a large cyst, she states that ovary was grapefruit size.  She does have follow-up with OB/GYN.    She is very frustrated with her complicated course, she has poor energy, and she is concerned for some infectious process.  She also is having a hard time keeping up with her work due to her condition.    The following portions of the patient's history were reviewed and updated as appropriate: allergies, current medications, past family history, past medical history, past social history, past surgical history and problem list.    Objective  /80 (BP Location: Left arm, Patient Position: Sitting, Cuff Size: Adult)   Pulse 68   Temp 98 °F (36.7 °C) (Temporal)   Ht 167 cm (65.75\")   Wt 85.5 kg (188 lb 6.4 oz)   BMI 30.64 " kg/m²     Physical Exam  Vitals reviewed.   Constitutional:       Appearance: Normal appearance.   HENT:      Head: Normocephalic and atraumatic.      Nose: No congestion.   Eyes:      Extraocular Movements: Extraocular movements intact.      Conjunctiva/sclera: Conjunctivae normal.   Cardiovascular:      Rate and Rhythm: Normal rate and regular rhythm.      Heart sounds: Normal heart sounds. No murmur heard.  Pulmonary:      Effort: Pulmonary effort is normal.      Breath sounds: Normal breath sounds.   Abdominal:      General: There is no distension.      Palpations: Abdomen is soft. There is no mass.      Tenderness: There is abdominal tenderness. There is no right CVA tenderness, left CVA tenderness or guarding.      Hernia: No hernia is present.      Comments: Abdomen is overall soft, mildly tender on palpation of the right upper quadrant, also somewhat tender lower abdomen, no rebound.   Musculoskeletal:         General: Tenderness present.      Cervical back: Neck supple.      Right lower leg: No edema.      Left lower leg: No edema.      Comments: Lower back: Tender to palpation paravertebral area of the left side lumbar.  Nontender midline.  Nontender on the right side.  Patient is able to bend forwards and touch the floor with her fingertips, get back up without difficulty.  Normal side rotation and side flexion bilaterally.   Skin:     General: Skin is warm and dry.   Neurological:      Mental Status: She is alert and oriented to person, place, and time. Mental status is at baseline.      Gait: Gait normal.      Comments: Able to stand on left and right foot without difficulty.  Normal gait.  Raise leg negative to 90 degrees bilaterally.   Psychiatric:         Behavior: Behavior normal.         Thought Content: Thought content normal.         Assessment/Plan   1. Fatigue, unspecified type  2. Lymphocytosis  I do not have any definite answers for her, but she has been worked up fairly thoroughly.  I am  wondering if she is experiencing some form of postinfectious fatigue.  More recently it shows improving blood work, downtrending liver enzymes, and I suspect most likely she has had some form of infection recently, most likely a viral infection.  She did have COVID earlier this year, but did not test positive later on.  He may very well be some other form of viral infection.  We will do repeat blood work as ordered.  She will return for follow-up with me in about a month.  Sooner if needed.  - Vitamin B12; Future  - CBC & Differential; Future  - Sedimentation Rate; Future  - C-reactive Protein; Future  - C3 Complement; Future  - C4 Complement; Future    3. Chronic left-sided low back pain without sciatica  I am not convinced this left lower back pain/flank pain is internal, to me it appears more on the surface.  She is tender to touch.  Because of her kidney issues I would not do NSAIDs like ibuprofen, Advil, but she can take Tylenol, which she feels does not help her much.  We can also do trial of Flexeril.  Physical activity and movement could also be beneficial.  - cyclobenzaprine (FLEXERIL) 5 MG tablet; Take 1-2 tablets by mouth 3 (Three) Times a Day As Needed for Muscle Spasms.  Dispense: 30 tablet; Refill: 1    4. Essential hypertension  BP Readings from Last 3 Encounters:   11/04/22 118/80   10/04/22 145/81   09/28/22 101/66   Blood pressure currently at goal.  She is already off lisinopril.  We will continue to monitor  - Comprehensive Metabolic Panel; Future    5. Screen for colon cancer  Patient did not have colonoscopy in the past, she is amenable to scheduling colonoscopy coming up.  - Ambulatory Referral For Screening Colonoscopy    Total time spent on chart review, charting and face-to-face with patient 78 minutes    Return in about 5 weeks (around 12/9/2022) for Recheck.    Future Appointments       Provider Department Center    [facility]2022 11[facility]    11/8/2022 1:00 PM [facility] BR FOLLOW-UP [facility] [address] [facility]    12/9/2022 2:30 PM Jorge Ramirez MD [facility] INTERNAL MEDICINE [facility]    12/21/2022 1:30 PM Johnathan Zamora MD [facility] CARDIOLOGY [facility]            Jorge Ramirez MD  Family Medicine  11/04/2022

## 2022-11-08 ENCOUNTER — HOSPITAL ENCOUNTER (OUTPATIENT)
Dept: MAMMOGRAPHY | Facility: HOSPITAL | Age: 47
Discharge: HOME | End: 2022-11-08
Admitting: FAMILY MEDICINE

## 2022-11-08 DIAGNOSIS — R92.8 ABNORMAL MAMMOGRAM: ICD-10-CM

## 2022-11-08 PROCEDURE — 77062 BREAST TOMOSYNTHESIS BI: CPT | Performed by: RADIOLOGY

## 2022-11-08 PROCEDURE — G0279 TOMOSYNTHESIS, MAMMO: HCPCS

## 2022-11-08 PROCEDURE — 77066 DX MAMMO INCL CAD BI: CPT

## 2022-11-08 PROCEDURE — 77066 DX MAMMO INCL CAD BI: CPT | Performed by: RADIOLOGY

## 2022-12-21 ENCOUNTER — OFFICE VISIT (OUTPATIENT)
Dept: CARDIOLOGY | Facility: CLINIC | Age: 47
End: 2022-12-21

## 2022-12-21 ENCOUNTER — LAB (OUTPATIENT)
Dept: LAB | Facility: HOSPITAL | Age: 47
End: 2022-12-21

## 2022-12-21 VITALS
OXYGEN SATURATION: 98 % | SYSTOLIC BLOOD PRESSURE: 122 MMHG | WEIGHT: 192 LBS | HEART RATE: 83 BPM | HEIGHT: 66 IN | DIASTOLIC BLOOD PRESSURE: 82 MMHG | BODY MASS INDEX: 30.86 KG/M2

## 2022-12-21 DIAGNOSIS — I10 ESSENTIAL HYPERTENSION: ICD-10-CM

## 2022-12-21 DIAGNOSIS — R00.2 PALPITATIONS: ICD-10-CM

## 2022-12-21 DIAGNOSIS — R00.2 PALPITATIONS: Primary | ICD-10-CM

## 2022-12-21 LAB
BASOPHILS # BLD AUTO: 0.06 10*3/MM3 (ref 0–0.2)
BASOPHILS NFR BLD AUTO: 0.7 % (ref 0–1.5)
DEPRECATED RDW RBC AUTO: 43.4 FL (ref 37–54)
EOSINOPHIL # BLD AUTO: 0.18 10*3/MM3 (ref 0–0.4)
EOSINOPHIL NFR BLD AUTO: 2.2 % (ref 0.3–6.2)
ERYTHROCYTE [DISTWIDTH] IN BLOOD BY AUTOMATED COUNT: 13.1 % (ref 12.3–15.4)
HCT VFR BLD AUTO: 40.9 % (ref 34–46.6)
HGB BLD-MCNC: 13.6 G/DL (ref 12–15.9)
IMM GRANULOCYTES # BLD AUTO: 0.01 10*3/MM3 (ref 0–0.05)
IMM GRANULOCYTES NFR BLD AUTO: 0.1 % (ref 0–0.5)
LYMPHOCYTES # BLD AUTO: 3.94 10*3/MM3 (ref 0.7–3.1)
LYMPHOCYTES NFR BLD AUTO: 48 % (ref 19.6–45.3)
MCH RBC QN AUTO: 29.9 PG (ref 26.6–33)
MCHC RBC AUTO-ENTMCNC: 33.3 G/DL (ref 31.5–35.7)
MCV RBC AUTO: 89.9 FL (ref 79–97)
MONOCYTES # BLD AUTO: 0.53 10*3/MM3 (ref 0.1–0.9)
MONOCYTES NFR BLD AUTO: 6.5 % (ref 5–12)
NEUTROPHILS NFR BLD AUTO: 3.48 10*3/MM3 (ref 1.7–7)
NEUTROPHILS NFR BLD AUTO: 42.5 % (ref 42.7–76)
NRBC BLD AUTO-RTO: 0 /100 WBC (ref 0–0.2)
PLATELET # BLD AUTO: 310 10*3/MM3 (ref 140–450)
PMV BLD AUTO: 11.1 FL (ref 6–12)
RBC # BLD AUTO: 4.55 10*6/MM3 (ref 3.77–5.28)
WBC NRBC COR # BLD: 8.2 10*3/MM3 (ref 3.4–10.8)

## 2022-12-21 PROCEDURE — 36415 COLL VENOUS BLD VENIPUNCTURE: CPT

## 2022-12-21 PROCEDURE — 80053 COMPREHEN METABOLIC PANEL: CPT

## 2022-12-21 PROCEDURE — 99213 OFFICE O/P EST LOW 20 MIN: CPT | Performed by: INTERNAL MEDICINE

## 2022-12-21 PROCEDURE — 85025 COMPLETE CBC W/AUTO DIFF WBC: CPT

## 2022-12-21 NOTE — PROGRESS NOTES
[facility] Cardiology  [address], [address]  [place], [place], [address] [phone]  WWW.[facility] Hospital[facility]yt[facility] Software[facility]           OUTPATIENT CLINIC NOTE    Patient care team:  Patient Care Team:  Gautam Lloyd MD as PCP - General (Family Medicine)  Wang Castrejon MD as Consulting Physician (Hematology and Oncology)  Johnathan Zamora MD as Consulting Physician (Cardiology)      Subjective:   Chief complaint:   Chief Complaint   Patient presents with   • Palpitations   • Essential hypertension       HPI:    Halle Rea is a 47 y.o. female.  Cardiac focused problem list:  1. Palpitations  a. Started at the time of her recurrent UTI/Pyelo  b. Possibly exacerbated since COVID, 2/2022  2. Hypertension   3. Hyperlipidemia   4. Asthma   5. Pyelonephritis   6. History of kidney stones   7. Recurrent complicated UTI's   a. Previously followed by ID at [facility] (last seen in 2020)  8. Elevated LFTs  a. Fall 2022  9. Ovarian cyst  10. Anxiety   11. Migraines   12. Vitamin D deficiency     Patient presents today for follow-up.    Palpitations still present, but improved.  Blood pressure has normalized, now off of lisinopril.  Denies exertional chest heaviness/pressure.    Review of Systems:  As noted above in the HPI    PFSH:  Patient Active Problem List   Diagnosis   • Ovarian cyst   • Cyst of left ovary   • Periodic headache syndrome, not intractable   • Urinary tract infection   • UTI (urinary tract infection)   • Essential hypertension   • Mixed hyperlipidemia   • Lymphocytosis   • Vitamin D deficiency   • Splenomegaly   • Seasonal allergic rhinitis   • Moderate intermittent asthma without complication   • History of kidney stones   • Anxiety         Current Outpatient Medications:   •  cholecalciferol (VITAMIN D3) 25 MCG (1000 UT) tablet, Take 1 tablet by mouth Every Other Day., Disp: , Rfl:   •  cyclobenzaprine (FLEXERIL) 5 MG tablet, Take 1-2 tablets by mouth 3 (Three)  "Times a Day As Needed for Muscle Spasms., Disp: 30 tablet, Rfl: 1  •  propranolol (INDERAL) 20 MG tablet, Take 20 mg by mouth As Needed., Disp: , Rfl:     Allergies   Allergen Reactions   • [place] Flavor Anaphylaxis   • Eggs Or Egg-Derived Products Nausea And Vomiting     Also itching   • Sulfa Antibiotics Other (See Comments)     Loses voice reaction as child       Social History     Socioeconomic History   • Marital status:    Tobacco Use   • Smoking status: Never   • Smokeless tobacco: Never   Vaping Use   • Vaping Use: Never used   Substance and Sexual Activity   • Alcohol use: Not Currently     Comment: once a year   • Drug use: Never   • Sexual activity: Yes     Partners: Male       Objective:   Physical Exam:  /82 (BP Location: Left arm, Patient Position: Sitting)   Pulse 83   Ht 167.6 cm (66\")   Wt 87.1 kg (192 lb)   SpO2 98%   BMI 30.99 kg/m²   CONSTITUTIONAL: No acute distress     Labs:    No results found for: CHOL  No results found for: TRIG  No results found for: HDL  No results found for: LDL  No components found for: LDLDIRECTC    Outside facility labs 7/27/2022:  WBC 10.4, hgb 14.0, hct 40.8, plt 336, K 4.2, BUN 11, creatinine 1.19, eGFR 49    Diagnostic Data:    Procedures    Results for orders placed during the hospital encounter of 10/03/22    Adult Transthoracic Echo Complete W/ Cont if Necessary Per Protocol    Interpretation Summary  · Left ventricular ejection fraction appears to be 56 - 60%. Left ventricular systolic function is normal.  · The cardiac valves are structurally and functionally within normal limits.      Assessment and Plan:     Palpitations  Dyspnea on exertion  Fatigue, unspecified type  -Patient with unclear etiology of symptoms.  Recurrent UTI.  Some concerns of an indolent infection.  Abnormal LFTs recently.  Possibly needs a cholecystectomy.  Intermittently elevated COLETTE.  Ongoing work-up by multiple subspecialists  -Cardiac work-up thus far unremarkable.  " Heart monitor and echo unremarkable.  No clear anginal equivalent  -Deferring on further cardiac work-up for now.    Essential hypertension  -Stable off of antihypertensives    Elevated LFTs  -Repeat CMP    Abnormal CBC with differential  -Repeat CBC with differential    - Return if symptoms worsen or fail to improve.      Johnathan Zamora MD, MSc, FACC, [facility]  Interventional Cardiology  [facility]

## 2022-12-22 ENCOUNTER — TELEPHONE (OUTPATIENT)
Dept: CARDIOLOGY | Facility: CLINIC | Age: 47
End: 2022-12-22

## 2022-12-22 LAB
ALBUMIN SERPL-MCNC: 4.2 G/DL (ref 3.5–5.2)
ALBUMIN/GLOB SERPL: 1.6 G/DL
ALP SERPL-CCNC: 63 U/L (ref 39–117)
ALT SERPL W P-5'-P-CCNC: 31 U/L (ref 1–33)
ANION GAP SERPL CALCULATED.3IONS-SCNC: 8 MMOL/L (ref 5–15)
AST SERPL-CCNC: 28 U/L (ref 1–32)
BILIRUB SERPL-MCNC: 0.6 MG/DL (ref 0–1.2)
BUN SERPL-MCNC: 10 MG/DL (ref 6–20)
BUN/CREAT SERPL: 9.3 (ref 7–25)
CALCIUM SPEC-SCNC: 9.4 MG/DL (ref 8.6–10.5)
CHLORIDE SERPL-SCNC: 103 MMOL/L (ref 98–107)
CO2 SERPL-SCNC: 28 MMOL/L (ref 22–29)
CREAT SERPL-MCNC: 1.07 MG/DL (ref 0.57–1)
EGFRCR SERPLBLD CKD-EPI 2021: 65 ML/MIN/1.73
GLOBULIN UR ELPH-MCNC: 2.6 GM/DL
GLUCOSE SERPL-MCNC: 75 MG/DL (ref 65–99)
POTASSIUM SERPL-SCNC: 4.4 MMOL/L (ref 3.5–5.2)
PROT SERPL-MCNC: 6.8 G/DL (ref 6–8.5)
SODIUM SERPL-SCNC: 139 MMOL/L (ref 136–145)

## 2022-12-22 NOTE — TELEPHONE ENCOUNTER
Patient contacted to review recent lab results. All questions answered, pt verbalizes understanding.

## 2022-12-22 NOTE — TELEPHONE ENCOUNTER
----- Message from Johnathan Zamora MD sent at 12/22/2022  3:44 PM EST -----  Liver enzymes normalized. CBC unchanged. Lymphocyte % only slightly above normal    ----- Message -----  From: Lab, Background User  Sent: 12/21/2022  11:41 PM EST  To: Johnathan Zamora MD

## 2023-05-23 ENCOUNTER — TRANSCRIBE ORDERS (OUTPATIENT)
Dept: LAB | Facility: HOSPITAL | Age: 48
End: 2023-05-23
Payer: OTHER GOVERNMENT

## 2023-05-23 ENCOUNTER — LAB (OUTPATIENT)
Dept: LAB | Facility: HOSPITAL | Age: 48
End: 2023-05-23
Payer: OTHER GOVERNMENT

## 2023-05-23 DIAGNOSIS — R39.9 GENITOURINARY SYMPTOMS: Primary | ICD-10-CM

## 2023-05-23 DIAGNOSIS — R39.9 GENITOURINARY SYMPTOMS: ICD-10-CM

## 2023-05-23 PROCEDURE — 80048 BASIC METABOLIC PNL TOTAL CA: CPT

## 2023-05-23 PROCEDURE — 36415 COLL VENOUS BLD VENIPUNCTURE: CPT

## 2023-05-23 PROCEDURE — 85025 COMPLETE CBC W/AUTO DIFF WBC: CPT

## 2023-05-24 LAB
ANION GAP SERPL CALCULATED.3IONS-SCNC: 12 MMOL/L (ref 5–15)
BASOPHILS # BLD AUTO: 0.07 10*3/MM3 (ref 0–0.2)
BASOPHILS NFR BLD AUTO: 0.8 % (ref 0–1.5)
BUN SERPL-MCNC: 14 MG/DL (ref 6–20)
BUN/CREAT SERPL: 13.1 (ref 7–25)
CALCIUM SPEC-SCNC: 9.6 MG/DL (ref 8.6–10.5)
CHLORIDE SERPL-SCNC: 105 MMOL/L (ref 98–107)
CO2 SERPL-SCNC: 24 MMOL/L (ref 22–29)
CREAT SERPL-MCNC: 1.07 MG/DL (ref 0.57–1)
DEPRECATED RDW RBC AUTO: 39.8 FL (ref 37–54)
EGFRCR SERPLBLD CKD-EPI 2021: 65 ML/MIN/1.73
EOSINOPHIL # BLD AUTO: 0.32 10*3/MM3 (ref 0–0.4)
EOSINOPHIL NFR BLD AUTO: 3.5 % (ref 0.3–6.2)
ERYTHROCYTE [DISTWIDTH] IN BLOOD BY AUTOMATED COUNT: 12.2 % (ref 12.3–15.4)
GLUCOSE SERPL-MCNC: 88 MG/DL (ref 65–99)
HCT VFR BLD AUTO: 44.1 % (ref 34–46.6)
HGB BLD-MCNC: 14.9 G/DL (ref 12–15.9)
IMM GRANULOCYTES # BLD AUTO: 0.02 10*3/MM3 (ref 0–0.05)
IMM GRANULOCYTES NFR BLD AUTO: 0.2 % (ref 0–0.5)
LYMPHOCYTES # BLD AUTO: 3.37 10*3/MM3 (ref 0.7–3.1)
LYMPHOCYTES NFR BLD AUTO: 37.3 % (ref 19.6–45.3)
MCH RBC QN AUTO: 30.1 PG (ref 26.6–33)
MCHC RBC AUTO-ENTMCNC: 33.8 G/DL (ref 31.5–35.7)
MCV RBC AUTO: 89.1 FL (ref 79–97)
MONOCYTES # BLD AUTO: 0.6 10*3/MM3 (ref 0.1–0.9)
MONOCYTES NFR BLD AUTO: 6.6 % (ref 5–12)
NEUTROPHILS NFR BLD AUTO: 4.65 10*3/MM3 (ref 1.7–7)
NEUTROPHILS NFR BLD AUTO: 51.6 % (ref 42.7–76)
NRBC BLD AUTO-RTO: 0 /100 WBC (ref 0–0.2)
PLATELET # BLD AUTO: 350 10*3/MM3 (ref 140–450)
PMV BLD AUTO: 11 FL (ref 6–12)
POTASSIUM SERPL-SCNC: 4.1 MMOL/L (ref 3.5–5.2)
RBC # BLD AUTO: 4.95 10*6/MM3 (ref 3.77–5.28)
SODIUM SERPL-SCNC: 141 MMOL/L (ref 136–145)
WBC NRBC COR # BLD: 9.03 10*3/MM3 (ref 3.4–10.8)

## 2023-07-12 NOTE — PROGRESS NOTES
Lilliam Life  : 2001  Primary: [employer] Benefits ([place] [employer])  Secondary: [address] @ [address] [address]  Phone: [phone]  Fax: [phone] Plan Frequency: 3x/week for 12 weeks  Plan of Care/Certification Expiration Date: 23      >PT Visit Info:  Plan Frequency: 3x/week for 12 weeks  Plan of Care/Certification Expiration Date: 23      Visit Count:  27    OUTPATIENT PHYSICAL THERAPY:OP NOTE TYPE: Treatment Note 2023       Episode  }Appt Desk             Treatment Diagnosis:  Pain in Right Hip (M25.551)  Stiffness of Right Hip, Not elsewhere classified (M25.651)  Medical/Referring Diagnosis:  Pain in right hip [M25.551]  Other specified joint disorders, unspecified hip [M25.859]  Referring Physician:  Natasha Larsen MD MD Orders:  PT Eval and Treat   Date of Onset:  Onset Date: 23 (s/p right hip labral repair)     Allergies:   Patient has no known allergies. Restrictions/Precautions:  Restrictions/Precautions: -- (per surgical precautions)  No data recorded   Interventions Planned (Treatment may consist of any combination of the following):    Current Treatment Recommendations: Strengthening; ROM; Balance training; Functional mobility training; Transfer training; ADL/Self-care training; Endurance training; Gait training; Stair training; Neuromuscular re-education; Manual; Pain management; Return to work related activity; Home exercise program; Patient/Caregiver education & training; Modalities; Therapeutic activities     >Subjective Comments:       No new problemst o report. >Initial:     0/10>Post Session:       0/10  Medications Last Reviewed:  2023  Updated Objective Findings:      Goals: (Goals have been discussed and agreed upon with patient.)  Short-Term Functional Goals: Time Frame: 6 weeks  MET  Patient will be independent with HEP.    Patient will demonstrate sufficient healing to Physical Therapy Daily Progress Note  Patient: Halle De Jesus   : 1975  Diagnosis/ICD-10 Code:  Dyspareunia, female [N94.10]  Referring practitioner: JOSE RAFAEL Saldana  Date of Initial Visit: Type: THERAPY  Noted: 2021  Today's Date: 10/29/2021  Patient seen for 5 sessions      Subjective   Halle De Jesus reports decreased soreness and improved discomfort. Bladder spasms aren't as bad this week. Not having to take medication. On period so wants to wait until next time for internal treatment.     Pain Rating (0-10): 3      Objective   verbal consent obtained for external pelvic exam/treatment with declined need for second person in room     See Exercise, Manual, and Modality Logs for complete treatment.      Assessment/Plan  Pt with improved pain symptoms following last visit and improved tolerance with decreased soreness. Had planned to progress to internal today but Pt on period so will start next visit. Pt to continue HEP at this time.     Progress per Plan of Care         1225/1338   Timed:         Manual Therapy:    38     mins  81203;     Therapeutic Exercise:    0     mins  29174;     Neuromuscular Kathryn:    0    mins  07170;    Therapeutic Activity:     5     mins  51517;     Gait Trainin     mins  44784;     Ultrasound:     0     mins  92499;    Ionto                               0    mins   48058  Self Care                       0     mins   93344  Canalith Repos               0    mins  20334    Un-Timed:  Electrical Stimulation:    0     mins  86698 ( );  Dry Needling     0     mins self-pay  Traction     0     mins 78453  Low Eval     0     Mins  10148  Mod Eval     0     Mins  10936  High Eval                       0     Mins  17162  Re-Eval                           0    mins  09797    Timed Treatment:   43   mins   Total Treatment:     43   mins    Surinder Vines, GÓMEZ  [place] License # [id number]  Physical Therapist

## 2023-08-24 RX ORDER — SODIUM PICOSULFATE, MAGNESIUM OXIDE, AND ANHYDROUS CITRIC ACID 10; 3.5; 12 MG/160ML; G/160ML; G/160ML
350 LIQUID ORAL TAKE AS DIRECTED
Qty: 350 ML | Refills: 0 | Status: SHIPPED | OUTPATIENT
Start: 2023-08-24

## 2023-08-25 ENCOUNTER — TELEPHONE (OUTPATIENT)
Dept: GASTROENTEROLOGY | Facility: CLINIC | Age: 48
End: 2023-08-25
Payer: OTHER GOVERNMENT

## 2023-08-25 NOTE — TELEPHONE ENCOUNTER
Caller: Halle Rea    Relationship to patient: Self    Best call back number: [phone]    Patient is needing: PT NEEDS TO RESCHEDULE COLONOSCOPY ON 9/1/23, SHE HAS ANOTHER PROCEDURE THAT DAY. PLEASE CALL BACK AND ADVISE.

## 2023-08-31 ENCOUNTER — TRANSCRIBE ORDERS (OUTPATIENT)
Dept: LAB | Facility: HOSPITAL | Age: 48
End: 2023-08-31
Payer: OTHER GOVERNMENT

## 2023-08-31 ENCOUNTER — LAB (OUTPATIENT)
Dept: LAB | Facility: HOSPITAL | Age: 48
End: 2023-08-31
Payer: OTHER GOVERNMENT

## 2023-08-31 DIAGNOSIS — K82.8 ADHESION OF GALLBLADDER: ICD-10-CM

## 2023-08-31 DIAGNOSIS — K82.8 ADHESION OF GALLBLADDER: Primary | ICD-10-CM

## 2023-08-31 LAB
ALBUMIN SERPL-MCNC: 4.4 G/DL (ref 3.5–5.2)
ALBUMIN/GLOB SERPL: 1.3 G/DL
ALP SERPL-CCNC: 67 U/L (ref 39–117)
ALT SERPL W P-5'-P-CCNC: 22 U/L (ref 1–33)
ANION GAP SERPL CALCULATED.3IONS-SCNC: 6 MMOL/L (ref 5–15)
AST SERPL-CCNC: 17 U/L (ref 1–32)
BASOPHILS # BLD AUTO: 0.04 10*3/MM3 (ref 0–0.2)
BASOPHILS NFR BLD AUTO: 0.6 % (ref 0–1.5)
BILIRUB SERPL-MCNC: 0.7 MG/DL (ref 0–1.2)
BUN SERPL-MCNC: 11 MG/DL (ref 6–20)
BUN/CREAT SERPL: 10.4 (ref 7–25)
CALCIUM SPEC-SCNC: 9.4 MG/DL (ref 8.6–10.5)
CHLORIDE SERPL-SCNC: 106 MMOL/L (ref 98–107)
CO2 SERPL-SCNC: 28 MMOL/L (ref 22–29)
CREAT SERPL-MCNC: 1.06 MG/DL (ref 0.57–1)
DEPRECATED RDW RBC AUTO: 39.1 FL (ref 37–54)
EGFRCR SERPLBLD CKD-EPI 2021: 65.3 ML/MIN/1.73
EOSINOPHIL # BLD AUTO: 0.1 10*3/MM3 (ref 0–0.4)
EOSINOPHIL NFR BLD AUTO: 1.5 % (ref 0.3–6.2)
ERYTHROCYTE [DISTWIDTH] IN BLOOD BY AUTOMATED COUNT: 11.9 % (ref 12.3–15.4)
GLOBULIN UR ELPH-MCNC: 3.4 GM/DL
GLUCOSE SERPL-MCNC: 102 MG/DL (ref 65–99)
HCT VFR BLD AUTO: 43.4 % (ref 34–46.6)
HGB BLD-MCNC: 14.5 G/DL (ref 12–15.9)
IMM GRANULOCYTES # BLD AUTO: 0.01 10*3/MM3 (ref 0–0.05)
IMM GRANULOCYTES NFR BLD AUTO: 0.2 % (ref 0–0.5)
LYMPHOCYTES # BLD AUTO: 2.86 10*3/MM3 (ref 0.7–3.1)
LYMPHOCYTES NFR BLD AUTO: 43.6 % (ref 19.6–45.3)
MCH RBC QN AUTO: 30 PG (ref 26.6–33)
MCHC RBC AUTO-ENTMCNC: 33.4 G/DL (ref 31.5–35.7)
MCV RBC AUTO: 89.9 FL (ref 79–97)
MONOCYTES # BLD AUTO: 0.4 10*3/MM3 (ref 0.1–0.9)
MONOCYTES NFR BLD AUTO: 6.1 % (ref 5–12)
NEUTROPHILS NFR BLD AUTO: 3.15 10*3/MM3 (ref 1.7–7)
NEUTROPHILS NFR BLD AUTO: 48 % (ref 42.7–76)
NRBC BLD AUTO-RTO: 0 /100 WBC (ref 0–0.2)
PLATELET # BLD AUTO: 350 10*3/MM3 (ref 140–450)
PMV BLD AUTO: 10.9 FL (ref 6–12)
POTASSIUM SERPL-SCNC: 4.4 MMOL/L (ref 3.5–5.2)
PROT SERPL-MCNC: 7.8 G/DL (ref 6–8.5)
RBC # BLD AUTO: 4.83 10*6/MM3 (ref 3.77–5.28)
SODIUM SERPL-SCNC: 140 MMOL/L (ref 136–145)
WBC NRBC COR # BLD: 6.56 10*3/MM3 (ref 3.4–10.8)

## 2023-08-31 PROCEDURE — 85025 COMPLETE CBC W/AUTO DIFF WBC: CPT

## 2023-08-31 PROCEDURE — 80053 COMPREHEN METABOLIC PANEL: CPT

## 2023-08-31 PROCEDURE — 36415 COLL VENOUS BLD VENIPUNCTURE: CPT

## 2023-09-08 ENCOUNTER — LAB REQUISITION (OUTPATIENT)
Dept: LAB | Facility: HOSPITAL | Age: 48
End: 2023-09-08
Payer: OTHER GOVERNMENT

## 2023-09-08 DIAGNOSIS — K82.8 OTHER SPECIFIED DISEASES OF GALLBLADDER: ICD-10-CM

## 2023-09-08 PROCEDURE — 88304 TISSUE EXAM BY PATHOLOGIST: CPT

## 2023-09-11 LAB — REF LAB TEST METHOD: NORMAL

## 2023-10-25 ENCOUNTER — TRANSCRIBE ORDERS (OUTPATIENT)
Dept: LAB | Facility: HOSPITAL | Age: 48
End: 2023-10-25
Payer: OTHER GOVERNMENT

## 2023-10-25 ENCOUNTER — LAB (OUTPATIENT)
Dept: LAB | Facility: HOSPITAL | Age: 48
End: 2023-10-25
Payer: OTHER GOVERNMENT

## 2023-10-25 DIAGNOSIS — M25.50 PAIN IN JOINT, MULTIPLE SITES: ICD-10-CM

## 2023-10-25 DIAGNOSIS — Z00.00 ROUTINE GENERAL MEDICAL EXAMINATION AT A HEALTH CARE FACILITY: ICD-10-CM

## 2023-10-25 DIAGNOSIS — Z00.00 ROUTINE GENERAL MEDICAL EXAMINATION AT A HEALTH CARE FACILITY: Primary | ICD-10-CM

## 2023-10-25 LAB
ALBUMIN SERPL-MCNC: 4.6 G/DL (ref 3.5–5.2)
ALP SERPL-CCNC: 71 U/L (ref 39–117)
ALT SERPL W P-5'-P-CCNC: 27 U/L (ref 1–33)
ANION GAP SERPL CALCULATED.3IONS-SCNC: 11.3 MMOL/L (ref 5–15)
AST SERPL-CCNC: 26 U/L (ref 1–32)
BASOPHILS # BLD AUTO: 0.08 10*3/MM3 (ref 0–0.2)
BASOPHILS NFR BLD AUTO: 0.9 % (ref 0–1.5)
BILIRUB CONJ SERPL-MCNC: <0.2 MG/DL (ref 0–0.3)
BILIRUB INDIRECT SERPL-MCNC: NORMAL MG/DL
BILIRUB SERPL-MCNC: 0.9 MG/DL (ref 0–1.2)
BUN SERPL-MCNC: 9 MG/DL (ref 6–20)
BUN/CREAT SERPL: 8.4 (ref 7–25)
C3 SERPL-MCNC: 156 MG/DL (ref 82–167)
C4 SERPL-MCNC: 34 MG/DL (ref 14–44)
CALCIUM SPEC-SCNC: 10 MG/DL (ref 8.6–10.5)
CHLORIDE SERPL-SCNC: 105 MMOL/L (ref 98–107)
CHOLEST SERPL-MCNC: 288 MG/DL (ref 0–200)
CHROMATIN AB SERPL-ACNC: <10 IU/ML (ref 0–14)
CO2 SERPL-SCNC: 24.7 MMOL/L (ref 22–29)
CREAT SERPL-MCNC: 1.07 MG/DL (ref 0.57–1)
DEPRECATED RDW RBC AUTO: 41.9 FL (ref 37–54)
EGFRCR SERPLBLD CKD-EPI 2021: 64.6 ML/MIN/1.73
EOSINOPHIL # BLD AUTO: 0.16 10*3/MM3 (ref 0–0.4)
EOSINOPHIL NFR BLD AUTO: 1.7 % (ref 0.3–6.2)
ERYTHROCYTE [DISTWIDTH] IN BLOOD BY AUTOMATED COUNT: 12.7 % (ref 12.3–15.4)
ERYTHROCYTE [SEDIMENTATION RATE] IN BLOOD: 11 MM/HR (ref 0–20)
GLUCOSE SERPL-MCNC: 78 MG/DL (ref 65–99)
HCT VFR BLD AUTO: 46.4 % (ref 34–46.6)
HDLC SERPL-MCNC: 62 MG/DL (ref 40–60)
HGB BLD-MCNC: 15.5 G/DL (ref 12–15.9)
IMM GRANULOCYTES # BLD AUTO: 0.03 10*3/MM3 (ref 0–0.05)
IMM GRANULOCYTES NFR BLD AUTO: 0.3 % (ref 0–0.5)
LDLC SERPL CALC-MCNC: 200 MG/DL (ref 0–100)
LDLC/HDLC SERPL: 3.18 {RATIO}
LYMPHOCYTES # BLD AUTO: 3.32 10*3/MM3 (ref 0.7–3.1)
LYMPHOCYTES NFR BLD AUTO: 35.6 % (ref 19.6–45.3)
MCH RBC QN AUTO: 30.6 PG (ref 26.6–33)
MCHC RBC AUTO-ENTMCNC: 33.4 G/DL (ref 31.5–35.7)
MCV RBC AUTO: 91.5 FL (ref 79–97)
MONOCYTES # BLD AUTO: 0.6 10*3/MM3 (ref 0.1–0.9)
MONOCYTES NFR BLD AUTO: 6.4 % (ref 5–12)
NEUTROPHILS NFR BLD AUTO: 5.13 10*3/MM3 (ref 1.7–7)
NEUTROPHILS NFR BLD AUTO: 55.1 % (ref 42.7–76)
NRBC BLD AUTO-RTO: 0 /100 WBC (ref 0–0.2)
PLATELET # BLD AUTO: 457 10*3/MM3 (ref 140–450)
PMV BLD AUTO: 11.4 FL (ref 6–12)
POTASSIUM SERPL-SCNC: 4.9 MMOL/L (ref 3.5–5.2)
PROT SERPL-MCNC: 8 G/DL (ref 6–8.5)
RBC # BLD AUTO: 5.07 10*6/MM3 (ref 3.77–5.28)
SODIUM SERPL-SCNC: 141 MMOL/L (ref 136–145)
T4 FREE SERPL-MCNC: 1.26 NG/DL (ref 0.93–1.7)
TRIGL SERPL-MCNC: 144 MG/DL (ref 0–150)
TSH SERPL DL<=0.05 MIU/L-ACNC: 1.68 UIU/ML (ref 0.27–4.2)
URATE SERPL-MCNC: 5.3 MG/DL (ref 2.4–5.7)
VLDLC SERPL-MCNC: 26 MG/DL (ref 5–40)
WBC NRBC COR # BLD: 9.32 10*3/MM3 (ref 3.4–10.8)

## 2023-10-25 PROCEDURE — 86431 RHEUMATOID FACTOR QUANT: CPT

## 2023-10-25 PROCEDURE — 86160 COMPLEMENT ANTIGEN: CPT

## 2023-10-25 PROCEDURE — 84550 ASSAY OF BLOOD/URIC ACID: CPT

## 2023-10-25 PROCEDURE — 80048 BASIC METABOLIC PNL TOTAL CA: CPT

## 2023-10-25 PROCEDURE — 85652 RBC SED RATE AUTOMATED: CPT

## 2023-10-25 PROCEDURE — 84443 ASSAY THYROID STIM HORMONE: CPT

## 2023-10-25 PROCEDURE — 80061 LIPID PANEL: CPT

## 2023-10-25 PROCEDURE — 86038 ANTINUCLEAR ANTIBODIES: CPT

## 2023-10-25 PROCEDURE — 84439 ASSAY OF FREE THYROXINE: CPT

## 2023-10-25 PROCEDURE — 82306 VITAMIN D 25 HYDROXY: CPT

## 2023-10-25 PROCEDURE — 85025 COMPLETE CBC W/AUTO DIFF WBC: CPT

## 2023-10-25 PROCEDURE — 80076 HEPATIC FUNCTION PANEL: CPT

## 2023-10-25 PROCEDURE — 36415 COLL VENOUS BLD VENIPUNCTURE: CPT

## 2023-10-26 LAB — 25(OH)D3 SERPL-MCNC: 46.6 NG/ML (ref 30–100)

## 2023-10-29 LAB — ANA SER QL IF: NEGATIVE

## 2023-11-27 ENCOUNTER — LAB (OUTPATIENT)
Dept: LAB | Facility: HOSPITAL | Age: 48
End: 2023-11-27
Payer: OTHER GOVERNMENT

## 2023-11-27 ENCOUNTER — TELEPHONE (OUTPATIENT)
Dept: GASTROENTEROLOGY | Facility: CLINIC | Age: 48
End: 2023-11-27
Payer: OTHER GOVERNMENT

## 2023-11-27 ENCOUNTER — TRANSCRIBE ORDERS (OUTPATIENT)
Dept: LAB | Facility: HOSPITAL | Age: 48
End: 2023-11-27
Payer: OTHER GOVERNMENT

## 2023-11-27 DIAGNOSIS — R94.4 DECREASED GFR: Primary | ICD-10-CM

## 2023-11-27 DIAGNOSIS — R94.4 DECREASED GFR: ICD-10-CM

## 2023-11-27 PROCEDURE — 80048 BASIC METABOLIC PNL TOTAL CA: CPT

## 2023-11-27 PROCEDURE — 36415 COLL VENOUS BLD VENIPUNCTURE: CPT

## 2023-11-27 NOTE — TELEPHONE ENCOUNTER
Caller: Halle Rea    Relationship to patient: Self    Best call back number: [phone]    Chief complaint: RESCHEDULE APPT    Type of visit: GASTRO PROCEDURE     If rescheduling, when is the original appointment: 12/01/23     Additional notes: PATIENT IS SCHEDULED AT OUTSIDE FACILITY.

## 2023-11-28 LAB
ANION GAP SERPL CALCULATED.3IONS-SCNC: 13 MMOL/L (ref 5–15)
BUN SERPL-MCNC: 10 MG/DL (ref 6–20)
BUN/CREAT SERPL: 11 (ref 7–25)
CALCIUM SPEC-SCNC: 9.7 MG/DL (ref 8.6–10.5)
CHLORIDE SERPL-SCNC: 102 MMOL/L (ref 98–107)
CO2 SERPL-SCNC: 24 MMOL/L (ref 22–29)
CREAT SERPL-MCNC: 0.91 MG/DL (ref 0.57–1)
EGFRCR SERPLBLD CKD-EPI 2021: 78.5 ML/MIN/1.73
GLUCOSE SERPL-MCNC: 126 MG/DL (ref 65–99)
POTASSIUM SERPL-SCNC: 4.3 MMOL/L (ref 3.5–5.2)
SODIUM SERPL-SCNC: 139 MMOL/L (ref 136–145)

## 2023-12-03 ENCOUNTER — HOSPITAL ENCOUNTER (EMERGENCY)
Facility: HOSPITAL | Age: 48
Discharge: HOME | End: 2023-12-03
Attending: EMERGENCY MEDICINE | Admitting: EMERGENCY MEDICINE
Payer: OTHER GOVERNMENT

## 2023-12-03 VITALS
WEIGHT: 195 LBS | DIASTOLIC BLOOD PRESSURE: 61 MMHG | HEIGHT: 67 IN | SYSTOLIC BLOOD PRESSURE: 118 MMHG | OXYGEN SATURATION: 100 % | BODY MASS INDEX: 30.61 KG/M2 | RESPIRATION RATE: 18 BRPM | HEART RATE: 71 BPM | TEMPERATURE: 98.2 F

## 2023-12-03 DIAGNOSIS — N39.0 CHRONIC UTI: Primary | ICD-10-CM

## 2023-12-03 LAB
ALBUMIN SERPL-MCNC: 4.5 G/DL (ref 3.5–5.2)
ALBUMIN/GLOB SERPL: 1.3 G/DL
ALP SERPL-CCNC: 70 U/L (ref 39–117)
ALT SERPL W P-5'-P-CCNC: 19 U/L (ref 1–33)
ANION GAP SERPL CALCULATED.3IONS-SCNC: 14 MMOL/L (ref 5–15)
AST SERPL-CCNC: 13 U/L (ref 1–32)
B-HCG UR QL: NEGATIVE
BACTERIA UR QL AUTO: ABNORMAL /HPF
BASOPHILS # BLD AUTO: 0.06 10*3/MM3 (ref 0–0.2)
BASOPHILS NFR BLD AUTO: 0.6 % (ref 0–1.5)
BILIRUB SERPL-MCNC: 0.5 MG/DL (ref 0–1.2)
BILIRUB UR QL STRIP: NEGATIVE
BUN SERPL-MCNC: 12 MG/DL (ref 6–20)
BUN/CREAT SERPL: 10.6 (ref 7–25)
CALCIUM SPEC-SCNC: 9.6 MG/DL (ref 8.6–10.5)
CHLORIDE SERPL-SCNC: 103 MMOL/L (ref 98–107)
CLARITY UR: CLEAR
CO2 SERPL-SCNC: 23 MMOL/L (ref 22–29)
COLOR UR: YELLOW
CREAT SERPL-MCNC: 1.13 MG/DL (ref 0.57–1)
D-LACTATE SERPL-SCNC: 1.1 MMOL/L (ref 0.5–2)
DEPRECATED RDW RBC AUTO: 40.3 FL (ref 37–54)
EGFRCR SERPLBLD CKD-EPI 2021: 60.5 ML/MIN/1.73
EOSINOPHIL # BLD AUTO: 0.08 10*3/MM3 (ref 0–0.4)
EOSINOPHIL NFR BLD AUTO: 0.8 % (ref 0.3–6.2)
ERYTHROCYTE [DISTWIDTH] IN BLOOD BY AUTOMATED COUNT: 12.1 % (ref 12.3–15.4)
EXPIRATION DATE: NORMAL
FLUAV SUBTYP SPEC NAA+PROBE: NOT DETECTED
FLUBV RNA ISLT QL NAA+PROBE: NOT DETECTED
GLOBULIN UR ELPH-MCNC: 3.4 GM/DL
GLUCOSE SERPL-MCNC: 93 MG/DL (ref 65–99)
GLUCOSE UR STRIP-MCNC: NEGATIVE MG/DL
HCT VFR BLD AUTO: 44.3 % (ref 34–46.6)
HGB BLD-MCNC: 14.9 G/DL (ref 12–15.9)
HGB UR QL STRIP.AUTO: NEGATIVE
HYALINE CASTS UR QL AUTO: ABNORMAL /LPF
IMM GRANULOCYTES # BLD AUTO: 0.03 10*3/MM3 (ref 0–0.05)
IMM GRANULOCYTES NFR BLD AUTO: 0.3 % (ref 0–0.5)
INTERNAL NEGATIVE CONTROL: NEGATIVE
INTERNAL POSITIVE CONTROL: POSITIVE
KETONES UR QL STRIP: NEGATIVE
LEUKOCYTE ESTERASE UR QL STRIP.AUTO: ABNORMAL
LIPASE SERPL-CCNC: 33 U/L (ref 13–60)
LYMPHOCYTES # BLD AUTO: 4.05 10*3/MM3 (ref 0.7–3.1)
LYMPHOCYTES NFR BLD AUTO: 39.3 % (ref 19.6–45.3)
Lab: NORMAL
MCH RBC QN AUTO: 30.6 PG (ref 26.6–33)
MCHC RBC AUTO-ENTMCNC: 33.6 G/DL (ref 31.5–35.7)
MCV RBC AUTO: 91 FL (ref 79–97)
MONOCYTES # BLD AUTO: 0.78 10*3/MM3 (ref 0.1–0.9)
MONOCYTES NFR BLD AUTO: 7.6 % (ref 5–12)
NEUTROPHILS NFR BLD AUTO: 5.31 10*3/MM3 (ref 1.7–7)
NEUTROPHILS NFR BLD AUTO: 51.4 % (ref 42.7–76)
NITRITE UR QL STRIP: NEGATIVE
NRBC BLD AUTO-RTO: 0 /100 WBC (ref 0–0.2)
PH UR STRIP.AUTO: 6.5 [PH] (ref 5–8)
PLATELET # BLD AUTO: 339 10*3/MM3 (ref 140–450)
PMV BLD AUTO: 10.9 FL (ref 6–12)
POTASSIUM SERPL-SCNC: 4 MMOL/L (ref 3.5–5.2)
PROCALCITONIN SERPL-MCNC: 0.04 NG/ML (ref 0–0.25)
PROT SERPL-MCNC: 7.9 G/DL (ref 6–8.5)
PROT UR QL STRIP: NEGATIVE
RBC # BLD AUTO: 4.87 10*6/MM3 (ref 3.77–5.28)
RBC # UR STRIP: ABNORMAL /HPF
REF LAB TEST METHOD: ABNORMAL
SARS-COV-2 RNA RESP QL NAA+PROBE: NOT DETECTED
SODIUM SERPL-SCNC: 140 MMOL/L (ref 136–145)
SP GR UR STRIP: 1.01 (ref 1–1.03)
SQUAMOUS #/AREA URNS HPF: ABNORMAL /HPF
UROBILINOGEN UR QL STRIP: ABNORMAL
WBC # UR STRIP: ABNORMAL /HPF
WBC NRBC COR # BLD AUTO: 10.31 10*3/MM3 (ref 3.4–10.8)

## 2023-12-03 PROCEDURE — 87636 SARSCOV2 & INF A&B AMP PRB: CPT | Performed by: EMERGENCY MEDICINE

## 2023-12-03 PROCEDURE — 81001 URINALYSIS AUTO W/SCOPE: CPT | Performed by: EMERGENCY MEDICINE

## 2023-12-03 PROCEDURE — 85025 COMPLETE CBC W/AUTO DIFF WBC: CPT | Performed by: EMERGENCY MEDICINE

## 2023-12-03 PROCEDURE — 80053 COMPREHEN METABOLIC PANEL: CPT | Performed by: EMERGENCY MEDICINE

## 2023-12-03 PROCEDURE — 81025 URINE PREGNANCY TEST: CPT | Performed by: EMERGENCY MEDICINE

## 2023-12-03 PROCEDURE — 84145 PROCALCITONIN (PCT): CPT | Performed by: EMERGENCY MEDICINE

## 2023-12-03 PROCEDURE — 25810000003 SODIUM CHLORIDE 0.9 % SOLUTION: Performed by: EMERGENCY MEDICINE

## 2023-12-03 PROCEDURE — 99283 EMERGENCY DEPT VISIT LOW MDM: CPT

## 2023-12-03 PROCEDURE — 83605 ASSAY OF LACTIC ACID: CPT | Performed by: EMERGENCY MEDICINE

## 2023-12-03 PROCEDURE — 83690 ASSAY OF LIPASE: CPT | Performed by: EMERGENCY MEDICINE

## 2023-12-03 RX ORDER — ONDANSETRON 2 MG/ML
4 INJECTION INTRAMUSCULAR; INTRAVENOUS
Status: DISCONTINUED | OUTPATIENT
Start: 2023-12-03 | End: 2023-12-03 | Stop reason: HOSPADM

## 2023-12-03 RX ORDER — METHENAMINE HIPPURATE 1000 MG/1
1 TABLET ORAL ONCE
Status: COMPLETED | OUTPATIENT
Start: 2023-12-03 | End: 2023-12-03

## 2023-12-03 RX ORDER — METHENAMINE HIPPURATE 1000 MG/1
1 TABLET ORAL 2 TIMES DAILY WITH MEALS
Qty: 14 TABLET | Refills: 0 | Status: SHIPPED | OUTPATIENT
Start: 2023-12-03 | End: 2023-12-10

## 2023-12-03 RX ORDER — SODIUM CHLORIDE 0.9 % (FLUSH) 0.9 %
10 SYRINGE (ML) INJECTION AS NEEDED
Status: DISCONTINUED | OUTPATIENT
Start: 2023-12-03 | End: 2023-12-03 | Stop reason: HOSPADM

## 2023-12-03 RX ADMIN — SODIUM CHLORIDE 1000 ML: 9 INJECTION, SOLUTION INTRAVENOUS at 16:36

## 2023-12-03 RX ADMIN — METHENAMINE HIPPURATE 1 G: 1000 TABLET ORAL at 18:02

## 2023-12-03 NOTE — ED PROVIDER NOTES
[place]    EMERGENCY DEPARTMENT ENCOUNTER      Pt Name: Halle Rea  MRN: [id number]  YOB: 1975  Date of evaluation: 12/3/2023  Provider: Rosales Tse DO    CHIEF COMPLAINT       Chief Complaint   Patient presents with    Dysuria       HPI  Stated Reason for Visit: Reoprts tx for UTI x 1 month with ABX for Klebsiella. Pt states she feels worse and now has fever and chills. Rec'd 4 rocephin shots this past week       HISTORY OF PRESENT ILLNESS  (Location/Symptom, Timing/Onset, Context/Setting, Quality, Duration, Modifying Factors, Severity.)   Halle Rea is a 48 y.o. female who presents to the emergency department for evaluation of prolonged urinary tract infection who has been through multiple rounds of antibiotics unfortunately symptoms have persisted and returned.  She notes she has had issues with persistent urinary tract infections 1 which lasted about a year, has been followed with her PCP, has had urinalysis and urine cultures which presented positive her over 100,000 Klebsiella pneumonia with pan sensitivities.  Has been on fosfomycin, rounds multiple antibiotics, continued symptoms, was given 5 days of daily Rocephin shots over the last 1 week, was started on Levaquin 750 mg this past week which she is currently taking.  She has been appointed to see infectious disease consultants but not yet had her appointment set.  She presents today with just overall not feeling well, nausea, subjective fever, chills, noting that she still feels just unwell in general.  She has followed with urologist, Dr. Angelo, has seen multiple providers, had cystoscopy, etc., is unsure the reason for her recurrent urinary infections.  Patient denies any other acute systemic complaints at this time.      Nursing notes were reviewed.      PAST MEDICAL HISTORY     Past Medical History:   Diagnosis Date    Abnormal ECG     Acid reflux     Arrhythmia     Arthritis      Asthma     Heart murmur     Hyperlipidemia     Hypertension     Positive Cedrick Cunningham ([facility]) virus antibody     Seasonal allergies     Sepsis     Urinary tract infection          SURGICAL HISTORY       Past Surgical History:   Procedure Laterality Date    DIAGNOSTIC LAPAROSCOPY EXPLORATORY LAPAROTOMY N/A 07/20/2016    Procedure: DIAGNOSTIC LAPAROSCOPY EXPLORATORY LAPAROTOMY;  Surgeon: Katherine Hernandez MD;  Location: [facility] OR;  Service:     MOUTH SURGERY      OOPHORECTOMY Left 2016    OVARIAN CYST DRAINAGE/EXCISION N/A 07/20/2016    Procedure: DIAGNOSTIC LAPAROSCOPY, OVARIAN CYSTECTOMY POSSIBLE OOPHORECTOMY, POSSIBLE LASER;  Surgeon: Katherine Hernandez MD;  Location:  [facility] OR;  Service:          CURRENT MEDICATIONS       Current Facility-Administered Medications:     ondansetron (ZOFRAN) injection 4 mg, 4 mg, Intravenous, Q30 Min PRN, Rosales Tse DO    [COMPLETED] Insert Peripheral IV, , , Once **AND** sodium chloride 0.9 % flush 10 mL, 10 mL, Intravenous, PRN, Rosales Tse, DO    Current Outpatient Medications:     cholecalciferol (VITAMIN D3) 25 MCG (1000 UT) tablet, Take 1 tablet by mouth Every Other Day., Disp: , Rfl:     cyclobenzaprine (FLEXERIL) 5 MG tablet, Take 1-2 tablets by mouth 3 (Three) Times a Day As Needed for Muscle Spasms., Disp: 30 tablet, Rfl: 1    HYDROcodone-acetaminophen (NORCO) 5-325 MG per tablet, Take 1 tablet by mouth Every 8 (Eight) Hours As Needed., Disp: 7 tablet, Rfl: 0    methenamine (Hiprex) 1 g tablet, Take 1 tablet by mouth 2 (Two) Times a Day With Meals for 7 days., Disp: 14 tablet, Rfl: 0    ondansetron (ZOFRAN) 4 MG tablet, Take 1 tablet by mouth Every 6 (Six) Hours As Needed., Disp: 7 tablet, Rfl: 0    propranolol (INDERAL) 20 MG tablet, Take 20 mg by mouth As Needed., Disp: , Rfl:     Sod Picosulfate-Mag Ox-Cit Acd (Clenpiq) 10-3.5-12 MG-GM -GM/160ML solution, Take 350 mL by mouth Take As Directed., Disp: 350 mL, Rfl: 0    ALLERGIES     Song flavor, Eggs  or egg-derived products, and Sulfa antibiotics    FAMILY HISTORY       Family History   Problem Relation Age of Onset    Hyperlipidemia Mother     Thyroid disease Mother     Hypothyroidism Mother     Dementia Mother     Migraines Mother     Arrhythmia Mother     Cancer Father     Hypertension Father     Thyroid disease Sister     Hypothyroidism Sister     Diabetes Son     Thyroid disease Maternal Aunt     Thyroid disease Cousin     Heart attack Paternal Grandfather     Breast cancer Neg Hx     Ovarian cancer Neg Hx           SOCIAL HISTORY       Social History     Socioeconomic History    Marital status:    Tobacco Use    Smoking status: Never    Smokeless tobacco: Never   Vaping Use    Vaping Use: Never used   Substance and Sexual Activity    Alcohol use: Not Currently     Comment: once a year    Drug use: Never    Sexual activity: Yes     Partners: Male         PHYSICAL EXAM    (up to 7 for level 4, 8 or more for level 5)     Vitals:    12/03/23 1638 12/03/23 1639 12/03/23 1700 12/03/23 1730   BP: 139/83  133/69 118/61   BP Location:       Patient Position:       Pulse:  79 72 71   Resp:       Temp:       TempSrc:       SpO2:  100% 100% 100%   Weight:       Height:           Physical Exam  General : Patient is awake, alert, oriented, in no acute distress, nontoxic appearing  HEENT: Pupils are equally round, EOMI, conjunctivae clear, there is no injection no icterus.  Oral mucosa is moist, uvula midline  Neck: Neck is supple, full range of motion, trachea midline  Cardiac: Heart regular rate, rhythm, no murmurs, rubs, or gallops  Lungs: Lungs are clear to auscultation, there is no wheezing, rhonchi, or rales. There is no use of accessory muscles  Abdomen: Abdomen is soft, nondistended, mild discomfort along the lower abdomen, no peritoneal signs examination.  Musculoskeletal: 5 out of 5 strength in all 4 extremities.  No focal muscle deficits are appreciated  Neuro: Motor intact, sensory intact, level of  consciousness is normal  Dermatology: Skin is warm and dry  Psych: Mentation is grossly normal, cognition is grossly normal. Affect is appropriate      DIAGNOSTIC RESULTS     EKG:  All EKGs are interpreted by the Emergency Department Physician who either signs or Co-signs this chart in the absence of a cardiologist.    No orders to display         ED BEDSIDE ULTRASOUND:   Performed by ED Physician - none    LABS:    I have reviewed and interpreted all of the currently available lab results from this visit (if applicable):  Results for orders placed or performed during the hospital encounter of 12/03/23   COVID-19 and FLU A/B PCR, 1 HR TAT - Swab, Nasopharynx    Specimen: Nasopharynx; Swab   Result Value Ref Range    COVID19 Not Detected Not Detected - Ref. Range    Influenza A PCR Not Detected Not Detected    Influenza B PCR Not Detected Not Detected   Comprehensive Metabolic Panel    Specimen: Blood   Result Value Ref Range    Glucose 93 65 - 99 mg/dL    BUN 12 6 - 20 mg/dL    Creatinine 1.13 (H) 0.57 - 1.00 mg/dL    Sodium 140 136 - 145 mmol/L    Potassium 4.0 3.5 - 5.2 mmol/L    Chloride 103 98 - 107 mmol/L    CO2 23.0 22.0 - 29.0 mmol/L    Calcium 9.6 8.6 - 10.5 mg/dL    Total Protein 7.9 6.0 - 8.5 g/dL    Albumin 4.5 3.5 - 5.2 g/dL    ALT (SGPT) 19 1 - 33 U/L    AST (SGOT) 13 1 - 32 U/L    Alkaline Phosphatase 70 39 - 117 U/L    Total Bilirubin 0.5 0.0 - 1.2 mg/dL    Globulin 3.4 gm/dL    A/G Ratio 1.3 g/dL    BUN/Creatinine Ratio 10.6 7.0 - 25.0    Anion Gap 14.0 5.0 - 15.0 mmol/L    eGFR 60.5 >60.0 mL/min/1.73   Urinalysis With Culture If Indicated - Urine, Clean Catch    Specimen: Urine, Clean Catch   Result Value Ref Range    Color, UA Yellow Yellow, Straw    Appearance, UA Clear Clear    pH, UA 6.5 5.0 - 8.0    Specific Gravity, UA 1.008 1.001 - 1.030    Glucose, UA Negative Negative    Ketones, UA Negative Negative    Bilirubin, UA Negative Negative    Blood, UA Negative Negative    Protein, UA Negative  Negative    Leuk Esterase, UA Trace (A) Negative    Nitrite, UA Negative Negative    Urobilinogen, UA 0.2 E.U./dL 0.2 - 1.0 E.U./dL   CBC Auto Differential    Specimen: Blood   Result Value Ref Range    WBC 10.31 3.40 - 10.80 10*3/mm3    RBC 4.87 3.77 - 5.28 10*6/mm3    Hemoglobin 14.9 12.0 - 15.9 g/dL    Hematocrit 44.3 34.0 - 46.6 %    MCV 91.0 79.0 - 97.0 fL    MCH 30.6 26.6 - 33.0 pg    MCHC 33.6 31.5 - 35.7 g/dL    RDW 12.1 (L) 12.3 - 15.4 %    RDW-SD 40.3 37.0 - 54.0 fl    MPV 10.9 6.0 - 12.0 fL    Platelets 339 140 - 450 10*3/mm3    Neutrophil % 51.4 42.7 - 76.0 %    Lymphocyte % 39.3 19.6 - 45.3 %    Monocyte % 7.6 5.0 - 12.0 %    Eosinophil % 0.8 0.3 - 6.2 %    Basophil % 0.6 0.0 - 1.5 %    Immature Grans % 0.3 0.0 - 0.5 %    Neutrophils, Absolute 5.31 1.70 - 7.00 10*3/mm3    Lymphocytes, Absolute 4.05 (H) 0.70 - 3.10 10*3/mm3    Monocytes, Absolute 0.78 0.10 - 0.90 10*3/mm3    Eosinophils, Absolute 0.08 0.00 - 0.40 10*3/mm3    Basophils, Absolute 0.06 0.00 - 0.20 10*3/mm3    Immature Grans, Absolute 0.03 0.00 - 0.05 10*3/mm3    nRBC 0.0 0.0 - 0.2 /100 WBC   Lipase    Specimen: Blood   Result Value Ref Range    Lipase 33 13 - 60 U/L   Lactic Acid, Plasma    Specimen: Blood   Result Value Ref Range    Lactate 1.1 0.5 - 2.0 mmol/L   Procalcitonin    Specimen: Blood   Result Value Ref Range    Procalcitonin 0.04 0.00 - 0.25 ng/mL   Urinalysis, Microscopic Only - Urine, Clean Catch    Specimen: Urine, Clean Catch   Result Value Ref Range    RBC, UA 0-2 None Seen, 0-2 /HPF    WBC, UA 3-5 (A) None Seen, 0-2 /HPF    Bacteria, UA None Seen None Seen, Trace /HPF    Squamous Epithelial Cells, UA 3-6 (A) None Seen, 0-2 /HPF    Hyaline Casts, UA 0-6 0 - 6 /LPF    Methodology Automated Microscopy    POC Urine Pregnancy    Specimen: Urine   Result Value Ref Range    HCG, Urine, QL Negative Negative    Lot Number [id number]     Internal Positive Control Positive Positive, Passed    Internal Negative Control Negative  Negative, Passed    Expiration Date 2025-02-04         If labs were ordered, I independently reviewed the results and considered them in treating the patient.      EMERGENCY DEPARTMENT COURSE and DIFFERENTIAL DIAGNOSIS/MDM:   Vitals:  AS OF 18:11 EST    BP - 118/61  HR - 71  TEMP - 98.2 °F (36.8 °C) (Oral)  O2 SATS - 100%      Orders placed during this visit:  Orders Placed This Encounter   Procedures    COVID PRE-OP / PRE-PROCEDURE SCREENING ORDER (NO ISOLATION) - Swab, Nasopharynx    COVID-19 and FLU A/B PCR, 1 HR TAT - Swab, Nasopharynx    Comprehensive Metabolic Panel    Urinalysis With Culture If Indicated - Urine, Clean Catch    CBC Auto Differential    Lipase    Lactic Acid, Plasma    Procalcitonin    Urinalysis, Microscopic Only - Urine, Clean Catch    POC Urine Pregnancy    Insert Peripheral IV    CBC & Differential       All labs have been independently reviewed by me.  All radiology studies have been reviewed by me and the radiologist dictating the report.  All EKG's have been independently viewed and interpreted by me.      Discussion below represents my analysis of pertinent findings related to patient's condition, differential diagnosis, treatment plan and final disposition.    Differential diagnosis:  The differential diagnosis associated with the patient's presentation includes: Chronic urinary tract infection, dysuria, sepsis, dehydration    Additional sources  Discussed/ obtained information from independent historians:   [] Spouse  [] Parent  [] Family member  [] Friend  [] EMS   [] Other:    External (non-ED) record review:   [] Inpatient record:   [] Office record:   [] Outpatient record:   [] Prior Outpatient labs:   [] Prior Outpatient radiology:   [] Primary Care record:   [] Outside ED record:   [] Other:     Patient's care impacted by:   [] Diabetes  [] Hypertension  [] CHF  [] Hyperlipidemia  [] Coronary Artery Disease   [] COPD   [] Cancer   [] Tobacco Abuse   [] Substance Abuse    [x]  Other: Chronic urinary tract infections    Care significantly affected by Social Determinants of Health (housing and economic circumstances, unemployment)    [] Yes     [x] No   If yes, Patient's care significantly limited by Social Determinants of Health including:   [] Inadequate housing   [] Low income   [] Alcoholism and drug addiction in family   [] Problems related to primary support group   [] Unemployment   [] Problems related to employment   [] Other Social Determinants of Health:       MEDICATIONS ADMINISTERED IN ED:  Medications   sodium chloride 0.9 % flush 10 mL (has no administration in time range)   ondansetron (ZOFRAN) injection 4 mg (has no administration in time range)   sodium chloride 0.9 % bolus 1,000 mL (0 mL Intravenous Stopped 12/3/23 1732)   methenamine (HIPREX) tablet 1 g (1 g Oral Given 12/3/23 1802)              31-year-old female with recurrent chronic urinary tract infections who has had persistent symptoms over the last few weeks.  Has follow with urologist, infectious disease specialist, has undergone multiple antibiotic therapy regimens with limited relief.  She is nontoxic-appearing, stable vital signs and initial evaluation.  IV, labs obtained with results as above.  White count 10.3, no left shift, kidney function creatinine 1.13, electrolytes liver function are stable.  She has normal lactic acid and procalcitonin, normal lipase levels.  Urinalysis with trace leukocytes, negative nitrates, no bacteria, 3-5 WBCs.  She negative for COVID, influenza.  We did send off for urine culture, the patient special follow-up with infectious disease.  I had a discussion with our pharmacist regarding possible medication regimen given her continued symptoms.  Recommends Biphetamine 1 g twice daily.  Has the patient can be followed closely with her infectious disease and urology specialist for further evaluation assessment.  No signs of underlying sepsis or significant abnormalities appreciated  on today's work-up.  I update the patient on these findings, we discussed medication adjustments, follow-up with her infect disease, urologist for further work-up reevaluation and medication adjustment as indicated.  She is happy here no signs of underlying sepsis or significant other abnormalities.  We will start her on medication as discussed, have her follow-up with her PCP and neurologist as planned, return to the ED with any worsening symptoms or further concerns in the meantime.    I had a discussion with the patient/family regarding diagnosis, diagnostic results, treatment plan, and medications.  The patient/family indicated understanding of these instructions.  I spent adequate time at the bedside preceding discharge necessary to personally discuss the aftercare instructions, giving patient education, providing explanations of the results of our evaluations/findings, and my decision making to assure that the patient/family understand the plan of care.  Time was allotted to answer questions at that time and throughout the ED course.  Emphasis was placed on timely follow-up after discharge.  I also discussed the potential for the development of an acute emergent condition requiring further evaluation, admission, or even surgical intervention. I discussed that we found nothing during the visit today indicating the need for further workup, admission, or the presence of an unstable medical condition.  I encouraged the patient to return to the emergency department immediately for ANY concerns, worsening, new complaints, or if symptoms persist and unable to seek follow-up in a timely fashion.  The patient/family expressed understanding and agreement with this plan.  The patient will follow-up with their PCP in 1-2 days for reevaluation.     PROCEDURES:  Procedures    CRITICAL CARE TIME    Total Critical Care time was 0 minutes, excluding separately reportable procedures.   There was a high probability of clinically  significant/life threatening deterioration in the patient's condition which required my urgent intervention.      FINAL IMPRESSION      1. Chronic UTI          DISPOSITION/PLAN     ED Disposition       ED Disposition   Discharge    Condition   Stable    Comment   --               PATIENT REFERRED TO:  [facility]  [address]  [facility] [id number] [phone]  Schedule an appointment as soon as possible for a visit       PATIENT CONNECTION - [place] [address]  [phone]  Schedule an appointment as soon as possible for a visit in 2 days      [facility] EMERGENCY [facility]PART[facility]  [address]  [facility] [address] [phone]    If symptoms worsen      DISCHARGE MEDICATIONS:     Medication List        START taking these medications      methenamine 1 g tablet  Commonly known as: Hiprex  Take 1 tablet by mouth 2 (Two) Times a Day With Meals for 7 days.            CONTINUE taking these medications      cholecalciferol 25 MCG (1000 UT) tablet  Commonly known as: VITAMIN D3     Clenpiq 10-3.5-12 MG-GM -GM/160ML solution  Generic drug: Sod Picosulfate-Mag Ox-Cit Acd  Take 350 mL by mouth Take As Directed.     cyclobenzaprine 5 MG tablet  Commonly known as: FLEXERIL  Take 1-2 tablets by mouth 3 (Three) Times a Day As Needed for Muscle Spasms.     HYDROcodone-acetaminophen 5-325 MG per tablet  Commonly known as: NORCO  Take 1 tablet by mouth Every 8 (Eight) Hours As Needed.     ondansetron 4 MG tablet  Commonly known as: ZOFRAN  Take 1 tablet by mouth Every 6 (Six) Hours As Needed.     propranolol 20 MG tablet  Commonly known as: INDERAL               Where to Get Your Medications        These medications were sent to [id number] - [place], [place] - [address] - [phone]  - [phone] FX  [address], [facility] [address]      Phone:  [phone]   methenamine 1 g tablet             Comment: Please note this report has been produced using speech recognition software.      Rosales Tse DO  Attending Emergency Physician         Rosales Tse DO  12/03/23 [id number]

## 2024-03-20 ENCOUNTER — TRANSCRIBE ORDERS (OUTPATIENT)
Dept: LAB | Facility: HOSPITAL | Age: 49
End: 2024-03-20
Payer: OTHER GOVERNMENT

## 2024-03-20 ENCOUNTER — LAB (OUTPATIENT)
Dept: LAB | Facility: HOSPITAL | Age: 49
End: 2024-03-20
Payer: OTHER GOVERNMENT

## 2024-03-20 DIAGNOSIS — N95.1 SYMPTOMATIC MENOPAUSAL OR FEMALE CLIMACTERIC STATES: Primary | ICD-10-CM

## 2024-03-20 DIAGNOSIS — N95.1 SYMPTOMATIC MENOPAUSAL OR FEMALE CLIMACTERIC STATES: ICD-10-CM

## 2024-03-20 LAB
ESTRADIOL SERPL HS-MCNC: 46.5 PG/ML
FSH SERPL-ACNC: 33 MIU/ML
LH SERPL-ACNC: 31.1 MIU/ML
PROGEST SERPL-MCNC: <0.05 NG/ML
TSH SERPL DL<=0.05 MIU/L-ACNC: 2.28 UIU/ML (ref 0.27–4.2)

## 2024-03-20 PROCEDURE — 84402 ASSAY OF FREE TESTOSTERONE: CPT

## 2024-03-20 PROCEDURE — 36415 COLL VENOUS BLD VENIPUNCTURE: CPT

## 2024-03-20 PROCEDURE — 84403 ASSAY OF TOTAL TESTOSTERONE: CPT

## 2024-03-20 PROCEDURE — 82670 ASSAY OF TOTAL ESTRADIOL: CPT

## 2024-03-20 PROCEDURE — 83001 ASSAY OF GONADOTROPIN (FSH): CPT

## 2024-03-20 PROCEDURE — 84443 ASSAY THYROID STIM HORMONE: CPT

## 2024-03-20 PROCEDURE — 83002 ASSAY OF GONADOTROPIN (LH): CPT

## 2024-03-20 PROCEDURE — 84144 ASSAY OF PROGESTERONE: CPT

## 2024-03-23 LAB
TESTOST FREE SERPL-MCNC: 1.2 PG/ML (ref 0–4.2)
TESTOST SERPL-MCNC: 17 NG/DL (ref 4–50)

## 2024-04-11 ENCOUNTER — LAB (OUTPATIENT)
Dept: LAB | Facility: HOSPITAL | Age: 49
End: 2024-04-11
Payer: OTHER GOVERNMENT

## 2024-04-11 ENCOUNTER — TRANSCRIBE ORDERS (OUTPATIENT)
Dept: LAB | Facility: HOSPITAL | Age: 49
End: 2024-04-11
Payer: OTHER GOVERNMENT

## 2024-04-11 DIAGNOSIS — Z00.00 ROUTINE GENERAL MEDICAL EXAMINATION AT A HEALTH CARE FACILITY: ICD-10-CM

## 2024-04-11 DIAGNOSIS — Z00.00 ROUTINE GENERAL MEDICAL EXAMINATION AT A HEALTH CARE FACILITY: Primary | ICD-10-CM

## 2024-04-11 PROCEDURE — 82306 VITAMIN D 25 HYDROXY: CPT

## 2024-04-11 PROCEDURE — 84481 FREE ASSAY (FT-3): CPT

## 2024-04-11 PROCEDURE — 86376 MICROSOMAL ANTIBODY EACH: CPT

## 2024-04-11 PROCEDURE — 83036 HEMOGLOBIN GLYCOSYLATED A1C: CPT

## 2024-04-11 PROCEDURE — 84439 ASSAY OF FREE THYROXINE: CPT

## 2024-04-11 PROCEDURE — 80053 COMPREHEN METABOLIC PANEL: CPT

## 2024-04-11 PROCEDURE — 36415 COLL VENOUS BLD VENIPUNCTURE: CPT

## 2024-04-11 PROCEDURE — 84443 ASSAY THYROID STIM HORMONE: CPT

## 2024-04-11 PROCEDURE — 80061 LIPID PANEL: CPT

## 2024-04-11 PROCEDURE — 82607 VITAMIN B-12: CPT

## 2024-04-11 PROCEDURE — 85025 COMPLETE CBC W/AUTO DIFF WBC: CPT

## 2024-04-11 PROCEDURE — 82728 ASSAY OF FERRITIN: CPT

## 2024-04-12 LAB
25(OH)D3 SERPL-MCNC: 35.2 NG/ML (ref 30–100)
ALBUMIN SERPL-MCNC: 4.3 G/DL (ref 3.5–5.2)
ALBUMIN/GLOB SERPL: 1.5 G/DL
ALP SERPL-CCNC: 73 U/L (ref 39–117)
ALT SERPL W P-5'-P-CCNC: 18 U/L (ref 1–33)
ANION GAP SERPL CALCULATED.3IONS-SCNC: 11.5 MMOL/L (ref 5–15)
AST SERPL-CCNC: 13 U/L (ref 1–32)
BASOPHILS # BLD AUTO: 0.04 10*3/MM3 (ref 0–0.2)
BASOPHILS NFR BLD AUTO: 0.5 % (ref 0–1.5)
BILIRUB SERPL-MCNC: 0.6 MG/DL (ref 0–1.2)
BUN SERPL-MCNC: 10 MG/DL (ref 6–20)
BUN/CREAT SERPL: 10.5 (ref 7–25)
CALCIUM SPEC-SCNC: 9 MG/DL (ref 8.6–10.5)
CHLORIDE SERPL-SCNC: 105 MMOL/L (ref 98–107)
CHOLEST SERPL-MCNC: 193 MG/DL (ref 0–200)
CO2 SERPL-SCNC: 21.5 MMOL/L (ref 22–29)
CREAT SERPL-MCNC: 0.95 MG/DL (ref 0.57–1)
DEPRECATED RDW RBC AUTO: 40.9 FL (ref 37–54)
EGFRCR SERPLBLD CKD-EPI 2021: 74.5 ML/MIN/1.73
EOSINOPHIL # BLD AUTO: 0.12 10*3/MM3 (ref 0–0.4)
EOSINOPHIL NFR BLD AUTO: 1.5 % (ref 0.3–6.2)
ERYTHROCYTE [DISTWIDTH] IN BLOOD BY AUTOMATED COUNT: 12.5 % (ref 12.3–15.4)
FERRITIN SERPL-MCNC: 22.4 NG/ML (ref 13–150)
GLOBULIN UR ELPH-MCNC: 2.8 GM/DL
GLUCOSE SERPL-MCNC: 86 MG/DL (ref 65–99)
HBA1C MFR BLD: 5.4 % (ref 4.8–5.6)
HCT VFR BLD AUTO: 44.5 % (ref 34–46.6)
HDLC SERPL-MCNC: 48 MG/DL (ref 40–60)
HGB BLD-MCNC: 14.3 G/DL (ref 12–15.9)
IMM GRANULOCYTES # BLD AUTO: 0.02 10*3/MM3 (ref 0–0.05)
IMM GRANULOCYTES NFR BLD AUTO: 0.3 % (ref 0–0.5)
LDLC SERPL CALC-MCNC: 122 MG/DL (ref 0–100)
LDLC/HDLC SERPL: 2.48 {RATIO}
LYMPHOCYTES # BLD AUTO: 2.5 10*3/MM3 (ref 0.7–3.1)
LYMPHOCYTES NFR BLD AUTO: 31.7 % (ref 19.6–45.3)
MCH RBC QN AUTO: 28.7 PG (ref 26.6–33)
MCHC RBC AUTO-ENTMCNC: 32.1 G/DL (ref 31.5–35.7)
MCV RBC AUTO: 89.2 FL (ref 79–97)
MONOCYTES # BLD AUTO: 0.51 10*3/MM3 (ref 0.1–0.9)
MONOCYTES NFR BLD AUTO: 6.5 % (ref 5–12)
NEUTROPHILS NFR BLD AUTO: 4.7 10*3/MM3 (ref 1.7–7)
NEUTROPHILS NFR BLD AUTO: 59.5 % (ref 42.7–76)
NRBC BLD AUTO-RTO: 0 /100 WBC (ref 0–0.2)
PLATELET # BLD AUTO: 338 10*3/MM3 (ref 140–450)
PMV BLD AUTO: 11.2 FL (ref 6–12)
POTASSIUM SERPL-SCNC: 4.4 MMOL/L (ref 3.5–5.2)
PROT SERPL-MCNC: 7.1 G/DL (ref 6–8.5)
RBC # BLD AUTO: 4.99 10*6/MM3 (ref 3.77–5.28)
SODIUM SERPL-SCNC: 138 MMOL/L (ref 136–145)
T3FREE SERPL-MCNC: 2.87 PG/ML (ref 2–4.4)
T4 FREE SERPL-MCNC: 1.12 NG/DL (ref 0.93–1.7)
TRIGL SERPL-MCNC: 131 MG/DL (ref 0–150)
TSH SERPL DL<=0.05 MIU/L-ACNC: 1.83 UIU/ML (ref 0.27–4.2)
VIT B12 BLD-MCNC: 392 PG/ML (ref 211–946)
VLDLC SERPL-MCNC: 23 MG/DL (ref 5–40)
WBC NRBC COR # BLD AUTO: 7.89 10*3/MM3 (ref 3.4–10.8)

## 2024-04-13 LAB — THYROPEROXIDASE AB SERPL-ACNC: <9 IU/ML (ref 0–34)

## 2024-12-11 ENCOUNTER — HOSPITAL ENCOUNTER (EMERGENCY)
Facility: HOSPITAL | Age: 49
Discharge: HOME | End: 2024-12-11
Attending: STUDENT IN AN ORGANIZED HEALTH CARE EDUCATION/TRAINING PROGRAM
Payer: OTHER GOVERNMENT

## 2024-12-11 VITALS
RESPIRATION RATE: 20 BRPM | OXYGEN SATURATION: 100 % | BODY MASS INDEX: 29.23 KG/M2 | HEIGHT: 66 IN | SYSTOLIC BLOOD PRESSURE: 136 MMHG | WEIGHT: 181.9 LBS | HEART RATE: 73 BPM | DIASTOLIC BLOOD PRESSURE: 85 MMHG | TEMPERATURE: 98.8 F

## 2024-12-11 DIAGNOSIS — R11.2 NAUSEA AND VOMITING, UNSPECIFIED VOMITING TYPE: ICD-10-CM

## 2024-12-11 DIAGNOSIS — R19.7 DIARRHEA, UNSPECIFIED TYPE: Primary | ICD-10-CM

## 2024-12-11 LAB
ALBUMIN SERPL-MCNC: 4 G/DL (ref 3.5–5.2)
ALBUMIN/GLOB SERPL: 1.3 G/DL
ALP SERPL-CCNC: 58 U/L (ref 39–117)
ALT SERPL W P-5'-P-CCNC: 9 U/L (ref 1–33)
ANION GAP SERPL CALCULATED.3IONS-SCNC: 10.9 MMOL/L (ref 5–15)
AST SERPL-CCNC: 20 U/L (ref 1–32)
BACTERIA UR QL AUTO: ABNORMAL /HPF
BASOPHILS # BLD AUTO: 0.02 10*3/MM3 (ref 0–0.2)
BASOPHILS NFR BLD AUTO: 0.3 % (ref 0–1.5)
BILIRUB SERPL-MCNC: 0.9 MG/DL (ref 0–1.2)
BILIRUB UR QL STRIP: NEGATIVE
BILIRUB UR QL STRIP: NEGATIVE
BUN SERPL-MCNC: 8 MG/DL (ref 6–20)
BUN/CREAT SERPL: 7.9 (ref 7–25)
CALCIUM SPEC-SCNC: 9 MG/DL (ref 8.6–10.5)
CHLORIDE SERPL-SCNC: 105 MMOL/L (ref 98–107)
CLARITY UR: ABNORMAL
CLARITY UR: ABNORMAL
CO2 SERPL-SCNC: 25.1 MMOL/L (ref 22–29)
COLOR UR: YELLOW
COLOR UR: YELLOW
CREAT SERPL-MCNC: 1.01 MG/DL (ref 0.57–1)
DEPRECATED RDW RBC AUTO: 40.6 FL (ref 37–54)
EGFRCR SERPLBLD CKD-EPI 2021: 68.8 ML/MIN/1.73
EOSINOPHIL # BLD AUTO: 0.04 10*3/MM3 (ref 0–0.4)
EOSINOPHIL NFR BLD AUTO: 0.6 % (ref 0.3–6.2)
ERYTHROCYTE [DISTWIDTH] IN BLOOD BY AUTOMATED COUNT: 12.2 % (ref 12.3–15.4)
GLOBULIN UR ELPH-MCNC: 3.1 GM/DL
GLUCOSE SERPL-MCNC: 107 MG/DL (ref 65–99)
GLUCOSE UR STRIP-MCNC: NEGATIVE MG/DL
GLUCOSE UR STRIP-MCNC: NEGATIVE MG/DL
HCG INTACT+B SERPL-ACNC: 0.44 MIU/ML
HCT VFR BLD AUTO: 46 % (ref 34–46.6)
HGB BLD-MCNC: 15.2 G/DL (ref 12–15.9)
HGB UR QL STRIP.AUTO: ABNORMAL
HGB UR QL STRIP.AUTO: ABNORMAL
HOLD SPECIMEN: NORMAL
HYALINE CASTS UR QL AUTO: ABNORMAL /LPF
IMM GRANULOCYTES # BLD AUTO: 0.01 10*3/MM3 (ref 0–0.05)
IMM GRANULOCYTES NFR BLD AUTO: 0.2 % (ref 0–0.5)
KETONES UR QL STRIP: NEGATIVE
KETONES UR QL STRIP: NEGATIVE
LEUKOCYTE ESTERASE UR QL STRIP.AUTO: ABNORMAL
LEUKOCYTE ESTERASE UR QL STRIP.AUTO: ABNORMAL
LIPASE SERPL-CCNC: 41 U/L (ref 13–60)
LYMPHOCYTES # BLD AUTO: 2.05 10*3/MM3 (ref 0.7–3.1)
LYMPHOCYTES NFR BLD AUTO: 33.2 % (ref 19.6–45.3)
MCH RBC QN AUTO: 29.6 PG (ref 26.6–33)
MCHC RBC AUTO-ENTMCNC: 33 G/DL (ref 31.5–35.7)
MCV RBC AUTO: 89.7 FL (ref 79–97)
MONOCYTES # BLD AUTO: 0.54 10*3/MM3 (ref 0.1–0.9)
MONOCYTES NFR BLD AUTO: 8.7 % (ref 5–12)
MUCOUS THREADS URNS QL MICRO: ABNORMAL /HPF
NEUTROPHILS NFR BLD AUTO: 3.52 10*3/MM3 (ref 1.7–7)
NEUTROPHILS NFR BLD AUTO: 57 % (ref 42.7–76)
NITRITE UR QL STRIP: NEGATIVE
NITRITE UR QL STRIP: NEGATIVE
PH UR STRIP.AUTO: 6.5 [PH] (ref 5–8)
PH UR STRIP.AUTO: 6.5 [PH] (ref 5–8)
PLATELET # BLD AUTO: 293 10*3/MM3 (ref 140–450)
PMV BLD AUTO: 10.7 FL (ref 6–12)
POTASSIUM SERPL-SCNC: 3.8 MMOL/L (ref 3.5–5.2)
PROT SERPL-MCNC: 7.1 G/DL (ref 6–8.5)
PROT UR QL STRIP: NEGATIVE
PROT UR QL STRIP: NEGATIVE
RBC # BLD AUTO: 5.13 10*6/MM3 (ref 3.77–5.28)
RBC # UR STRIP: ABNORMAL /HPF
REF LAB TEST METHOD: ABNORMAL
SODIUM SERPL-SCNC: 141 MMOL/L (ref 136–145)
SP GR UR STRIP: <=1.005 (ref 1–1.03)
SP GR UR STRIP: <=1.005 (ref 1–1.03)
SQUAMOUS #/AREA URNS HPF: ABNORMAL /HPF
UROBILINOGEN UR QL STRIP: ABNORMAL
UROBILINOGEN UR QL STRIP: ABNORMAL
WBC # UR STRIP: ABNORMAL /HPF
WBC NRBC COR # BLD AUTO: 6.18 10*3/MM3 (ref 3.4–10.8)
WHOLE BLOOD HOLD COAG: NORMAL
WHOLE BLOOD HOLD SPECIMEN: NORMAL

## 2024-12-11 PROCEDURE — 99283 EMERGENCY DEPT VISIT LOW MDM: CPT

## 2024-12-11 PROCEDURE — 25810000003 SODIUM CHLORIDE 0.9 % SOLUTION: Performed by: PHYSICIAN ASSISTANT

## 2024-12-11 PROCEDURE — 83690 ASSAY OF LIPASE: CPT | Performed by: STUDENT IN AN ORGANIZED HEALTH CARE EDUCATION/TRAINING PROGRAM

## 2024-12-11 PROCEDURE — 85025 COMPLETE CBC W/AUTO DIFF WBC: CPT | Performed by: STUDENT IN AN ORGANIZED HEALTH CARE EDUCATION/TRAINING PROGRAM

## 2024-12-11 PROCEDURE — 84702 CHORIONIC GONADOTROPIN TEST: CPT | Performed by: STUDENT IN AN ORGANIZED HEALTH CARE EDUCATION/TRAINING PROGRAM

## 2024-12-11 PROCEDURE — 80053 COMPREHEN METABOLIC PANEL: CPT | Performed by: STUDENT IN AN ORGANIZED HEALTH CARE EDUCATION/TRAINING PROGRAM

## 2024-12-11 PROCEDURE — 81001 URINALYSIS AUTO W/SCOPE: CPT | Performed by: STUDENT IN AN ORGANIZED HEALTH CARE EDUCATION/TRAINING PROGRAM

## 2024-12-11 PROCEDURE — 96374 THER/PROPH/DIAG INJ IV PUSH: CPT

## 2024-12-11 PROCEDURE — 25010000002 ONDANSETRON PER 1 MG: Performed by: PHYSICIAN ASSISTANT

## 2024-12-11 PROCEDURE — 87086 URINE CULTURE/COLONY COUNT: CPT | Performed by: PHYSICIAN ASSISTANT

## 2024-12-11 PROCEDURE — 36415 COLL VENOUS BLD VENIPUNCTURE: CPT

## 2024-12-11 RX ORDER — PROMETHAZINE HYDROCHLORIDE 25 MG/1
25 SUPPOSITORY RECTAL EVERY 6 HOURS PRN
Qty: 12 SUPPOSITORY | Refills: 0 | Status: SHIPPED | OUTPATIENT
Start: 2024-12-11

## 2024-12-11 RX ORDER — ONDANSETRON 2 MG/ML
4 INJECTION INTRAMUSCULAR; INTRAVENOUS ONCE
Status: COMPLETED | OUTPATIENT
Start: 2024-12-11 | End: 2024-12-11

## 2024-12-11 RX ORDER — ONDANSETRON 4 MG/1
4 TABLET, FILM COATED ORAL EVERY 8 HOURS PRN
Qty: 12 TABLET | Refills: 0 | Status: SHIPPED | OUTPATIENT
Start: 2024-12-11

## 2024-12-11 RX ORDER — SODIUM CHLORIDE 9 MG/ML
10 INJECTION, SOLUTION INTRAMUSCULAR; INTRAVENOUS; SUBCUTANEOUS AS NEEDED
Status: DISCONTINUED | OUTPATIENT
Start: 2024-12-11 | End: 2024-12-11 | Stop reason: HOSPADM

## 2024-12-11 RX ADMIN — ONDANSETRON 4 MG: 2 INJECTION INTRAMUSCULAR; INTRAVENOUS at 13:17

## 2024-12-11 RX ADMIN — SODIUM CHLORIDE 1000 ML: 9 INJECTION, SOLUTION INTRAVENOUS at 13:15

## 2024-12-11 NOTE — DISCHARGE INSTRUCTIONS
Take medication as prescribed.  Return to the ER for worsening of symptoms.  Follow-up with your PCP

## 2024-12-11 NOTE — FSED PROVIDER NOTE
[facility]    EMERGENCY DEPARTMENT ENCOUNTER      Pt Name: Halle Rea  MRN: [id number]  YOB: 1975  Date of evaluation: 12/11/2024  Provider: Jessie Sy PA-C    CHIEF COMPLAINT       Chief Complaint   Patient presents with    Diarrhea     HISTORY OF PRESENT ILLNESS  (Location/Symptom, Timing/Onset, Context/Setting, Quality, Duration, Modifying Factors, Severity.)   Halle Rea is a 49 y.o. female who presents to the emergency department with nausea, vomiting, diarrhea since Friday.  Patient has been taking Phenergan at home without improvement in symptoms.  She states she thinks she has a stomach virus.  She denies any recent sick contacts.    Nursing notes were reviewed.  REVIEW OF SYSTEMS    (2-9 systems for level 4, 10 or more for level 5)   Review of Systems   Constitutional:  Negative for chills and fever.   HENT:  Negative for ear pain and sore throat.    Respiratory:  Negative for cough and shortness of breath.    Cardiovascular:  Negative for chest pain.   Gastrointestinal:  Positive for diarrhea, nausea and vomiting. Negative for abdominal pain.   Genitourinary:  Negative for dysuria and frequency.   Musculoskeletal:  Negative for back pain and neck pain.   Neurological:  Negative for headaches.      All systems reviewed and negative except for those discussed in HPI.   PAST MEDICAL HISTORY     Past Medical History:   Diagnosis Date    Abnormal ECG     Acid reflux     Arrhythmia     Arthritis     Asthma     Heart murmur     Hyperlipidemia     Hypertension     Positive Cedrick Cunningham ([facility]) virus antibody     Seasonal allergies     Sepsis     Urinary tract infection      SURGICAL HISTORY       Past Surgical History:   Procedure Laterality Date    DIAGNOSTIC LAPAROSCOPY EXPLORATORY LAPAROTOMY N/A 07/20/2016    Procedure: DIAGNOSTIC LAPAROSCOPY EXPLORATORY LAPAROTOMY;  Surgeon: Katherine Hernandez MD;  Location: [facility];  Service:     MOUTH SURGERY       OOPHORECTOMY Left 2016    OVARIAN CYST DRAINAGE/EXCISION N/A 07/20/2016    Procedure: DIAGNOSTIC LAPAROSCOPY, OVARIAN CYSTECTOMY POSSIBLE OOPHORECTOMY, POSSIBLE LASER;  Surgeon: Katherine Hernandez MD;  Location: [facility];  Service:      CURRENT MEDICATIONS     No current facility-administered medications for this encounter.    Current Outpatient Medications:     cholecalciferol (VITAMIN D3) 25 MCG (1000 UT) tablet, Take 1 tablet by mouth Every Other Day., Disp: , Rfl:     cyclobenzaprine (FLEXERIL) 5 MG tablet, Take 1-2 tablets by mouth 3 (Three) Times a Day As Needed for Muscle Spasms., Disp: 30 tablet, Rfl: 1    HYDROcodone-acetaminophen (NORCO) 5-325 MG per tablet, Take 1 tablet by mouth Every 8 (Eight) Hours As Needed., Disp: 7 tablet, Rfl: 0    ondansetron (ZOFRAN) 4 MG tablet, Take 1 tablet by mouth Every 6 (Six) Hours As Needed., Disp: 7 tablet, Rfl: 0    ondansetron (ZOFRAN) 4 MG tablet, Take 1 tablet by mouth Every 8 (Eight) Hours As Needed for Nausea or Vomiting., Disp: 12 tablet, Rfl: 0    promethazine (PHENERGAN) 25 MG suppository, Unwrap and insert 1 suppository into the rectum Every 6 (Six) Hours As Needed for Nausea or Vomiting., Disp: 12 suppository, Rfl: 0    propranolol (INDERAL) 20 MG tablet, Take 20 mg by mouth As Needed., Disp: , Rfl:     Sod Picosulfate-Mag Ox-Cit Acd (Clenpiq) 10-3.5-12 MG-GM -GM/160ML solution, Take 350 mL by mouth Take As Directed., Disp: 350 mL, Rfl: 0    ALLERGIES     Song flavor, Egg-derived products, and Sulfa antibiotics    FAMILY HISTORY       Family History   Problem Relation Age of Onset    Hyperlipidemia Mother     Thyroid disease Mother     Hypothyroidism Mother     Dementia Mother     Migraines Mother     Arrhythmia Mother     Cancer Father     Hypertension Father     Thyroid disease Sister     Hypothyroidism Sister     Diabetes Son     Thyroid disease Maternal Aunt     Thyroid disease Cousin     Heart attack Paternal Grandfather     Breast cancer Neg Hx      Ovarian cancer Neg Hx      SOCIAL HISTORY       Social History     Socioeconomic History    Marital status:    Tobacco Use    Smoking status: Never    Smokeless tobacco: Never   Vaping Use    Vaping status: Never Used   Substance and Sexual Activity    Alcohol use: Not Currently     Comment: once a year    Drug use: Never    Sexual activity: Yes     Partners: Male     PHYSICAL EXAM    (up to 7 for level 4, 8 or more for level 5)   Physical Exam  Vitals and nursing note reviewed.   HENT:      Head: Normocephalic and atraumatic.   Eyes:      Extraocular Movements: Extraocular movements intact.      Pupils: Pupils are equal, round, and reactive to light.   Cardiovascular:      Rate and Rhythm: Normal rate and regular rhythm.      Pulses: Normal pulses.   Pulmonary:      Effort: Pulmonary effort is normal.      Breath sounds: Normal breath sounds.   Abdominal:      General: Abdomen is flat. Bowel sounds are normal.      Palpations: Abdomen is soft.   Musculoskeletal:         General: Normal range of motion.      Cervical back: Normal range of motion.   Skin:     General: Skin is warm and dry.   Neurological:      General: No focal deficit present.      Mental Status: She is alert and oriented to person, place, and time.   Psychiatric:         Mood and Affect: Mood normal.         Behavior: Behavior normal.        DIAGNOSTIC RESULTS     EKG: All EKGs are interpreted by the Emergency Department Physician who either signs or Co-signs this chart in the absence of a cardiologist.    No orders to display     RADIOLOGY:   Non-plain film images such as CT, Ultrasound and MRI are read by the radiologist. Plain radiographic images are visualized and preliminarily interpreted by the emergency physician with the below findings:    [] Radiologist's Report Reviewed:  No orders to display     ED BEDSIDE ULTRASOUND:   Performed by ED Physician - none    LABS:    I have reviewed and interpreted all of the currently available lab  results from this visit (if applicable):  Results for orders placed or performed during the hospital encounter of 12/11/24   Comprehensive Metabolic Panel    Collection Time: 12/11/24 12:43 PM    Specimen: Blood   Result Value Ref Range    Glucose 107 (H) 65 - 99 mg/dL    BUN 8 6 - 20 mg/dL    Creatinine 1.01 (H) 0.57 - 1.00 mg/dL    Sodium 141 136 - 145 mmol/L    Potassium 3.8 3.5 - 5.2 mmol/L    Chloride 105 98 - 107 mmol/L    CO2 25.1 22.0 - 29.0 mmol/L    Calcium 9.0 8.6 - 10.5 mg/dL    Total Protein 7.1 6.0 - 8.5 g/dL    Albumin 4.0 3.5 - 5.2 g/dL    ALT (SGPT) 9 1 - 33 U/L    AST (SGOT) 20 1 - 32 U/L    Alkaline Phosphatase 58 39 - 117 U/L    Total Bilirubin 0.9 0.0 - 1.2 mg/dL    Globulin 3.1 gm/dL    A/G Ratio 1.3 g/dL    BUN/Creatinine Ratio 7.9 7.0 - 25.0    Anion Gap 10.9 5.0 - 15.0 mmol/L    eGFR 68.8 >60.0 mL/min/1.73   Lipase    Collection Time: 12/11/24 12:43 PM    Specimen: Blood   Result Value Ref Range    Lipase 41 13 - 60 U/L   hCG, Quantitative, Pregnancy    Collection Time: 12/11/24 12:43 PM    Specimen: Blood   Result Value Ref Range    HCG Quantitative 0.44 mIU/mL   CBC Auto Differential    Collection Time: 12/11/24 12:43 PM    Specimen: Blood   Result Value Ref Range    WBC 6.18 3.40 - 10.80 10*3/mm3    RBC 5.13 3.77 - 5.28 10*6/mm3    Hemoglobin 15.2 12.0 - 15.9 g/dL    Hematocrit 46.0 34.0 - 46.6 %    MCV 89.7 79.0 - 97.0 fL    MCH 29.6 26.6 - 33.0 pg    MCHC 33.0 31.5 - 35.7 g/dL    RDW 12.2 (L) 12.3 - 15.4 %    RDW-SD 40.6 37.0 - 54.0 fl    MPV 10.7 6.0 - 12.0 fL    Platelets 293 140 - 450 10*3/mm3    Neutrophil % 57.0 42.7 - 76.0 %    Lymphocyte % 33.2 19.6 - 45.3 %    Monocyte % 8.7 5.0 - 12.0 %    Eosinophil % 0.6 0.3 - 6.2 %    Basophil % 0.3 0.0 - 1.5 %    Immature Grans % 0.2 0.0 - 0.5 %    Neutrophils, Absolute 3.52 1.70 - 7.00 10*3/mm3    Lymphocytes, Absolute 2.05 0.70 - 3.10 10*3/mm3    Monocytes, Absolute 0.54 0.10 - 0.90 10*3/mm3    Eosinophils, Absolute 0.04 0.00 - 0.40  10*3/mm3    Basophils, Absolute 0.02 0.00 - 0.20 10*3/mm3    Immature Grans, Absolute 0.01 0.00 - 0.05 10*3/mm3   Green Top (Gel)    Collection Time: 12/11/24 12:43 PM   Result Value Ref Range    Extra Tube Hold for add-ons.    Lavender Top    Collection Time: 12/11/24 12:43 PM   Result Value Ref Range    Extra Tube hold for add-on    Gold Top - SST    Collection Time: 12/11/24 12:43 PM   Result Value Ref Range    Extra Tube Hold for add-ons.    Gray Top    Collection Time: 12/11/24 12:43 PM   Result Value Ref Range    Extra Tube Hold for add-ons.    Light Blue Top    Collection Time: 12/11/24 12:43 PM   Result Value Ref Range    Extra Tube Hold for add-ons.    Urinalysis With Microscopic If Indicated (No Culture) - Urine, Clean Catch    Collection Time: 12/11/24 12:44 PM    Specimen: Urine, Clean Catch   Result Value Ref Range    Color, UA Yellow Yellow, Straw    Appearance, UA Slightly Cloudy (A) Clear    pH, UA 6.5 5.0 - 8.0    Specific Gravity, UA <=1.005 1.005 - 1.030    Glucose, UA Negative Negative    Ketones, UA Negative Negative    Bilirubin, UA Negative Negative    Blood, UA Small (1+) (A) Negative    Protein, UA Negative Negative    Leuk Esterase, UA Small (1+) (A) Negative    Nitrite, UA Negative Negative    Urobilinogen, UA 0.2 E.U./dL 0.2 - 1.0 E.U./dL   Urinalysis, Microscopic Only - Urine, Clean Catch    Collection Time: 12/11/24 12:44 PM    Specimen: Urine, Clean Catch   Result Value Ref Range    RBC, UA 3-5 (A) None Seen, 0-2 /HPF    WBC, UA 11-20 (A) None Seen, 0-2 /HPF    Bacteria, UA 1+ (A) None Seen /HPF    Squamous Epithelial Cells, UA 31-50 (A) None Seen, 0-2 /HPF    Hyaline Casts, UA 3-6 None Seen /LPF    Mucus, UA Trace None Seen, Trace /HPF    Methodology Manual Light Microscopy    Urinalysis With Culture If Indicated - Urine, Clean Catch    Collection Time: 12/11/24 12:44 PM    Specimen: Urine, Clean Catch   Result Value Ref Range    Color, UA Yellow Yellow, Straw    Appearance, UA  "Slightly Cloudy (A) Clear    pH, UA 6.5 5.0 - 8.0    Specific Gravity, UA <=1.005 1.005 - 1.030    Glucose, UA Negative Negative    Ketones, UA Negative Negative    Bilirubin, UA Negative Negative    Blood, UA Small (1+) (A) Negative    Protein, UA Negative Negative    Leuk Esterase, UA Small (1+) (A) Negative    Nitrite, UA Negative Negative    Urobilinogen, UA 0.2 E.U./dL 0.2 - 1.0 E.U./dL        All other labs were within normal range or not returned as of this dictation.    EMERGENCY DEPARTMENT COURSE and DIFFERENTIAL DIAGNOSIS/MDM:   Vitals:    Vitals:    12/11/24 1213 12/11/24 1322   BP: 150/93 136/85   Pulse: 96 73   Resp: 20    Temp: 98.8 °F (37.1 °C)    TempSrc: Oral    SpO2: 98% 100%   Weight: 82.5 kg (181 lb 14.4 oz)    Height: 167.6 cm (66\")         MDM     I had a discussion with the patient/family regarding diagnosis, diagnostic results, treatment plan, and medications.  The patient/family indicated understanding of these instructions.  I spent adequate time at the bedside preceding discharge necessary to personally discuss the aftercare instructions, giving patient education, providing explanations of the results of our evaluations/findings, and my decision making to assure that the patient/family understand the plan of care.  Time was allotted to answer questions at that time and throughout the ED course.  Emphasis was placed on timely follow-up after discharge.  I also discussed the potential for the development of an acute emergent condition requiring further evaluation, admission, or even surgical intervention. I discussed that we found nothing during the visit today indicating the need for further workup, admission, or the presence of an unstable medical condition.  I encouraged the patient to return to the emergency department immediately for ANY concerns, worsening, new complaints, or if symptoms persist and unable to seek follow-up in a timely fashion.  The patient/family expressed " understanding and agreement with this plan.  The patient will follow-up with her PCP for reevaluation.  Nonsurgical abdomen present discharge.  Patient states she is feeling much better after the IV fluids.    MEDICATIONS ADMINISTERED IN ED:  Medications   sodium chloride 0.9 % bolus 1,000 mL (0 mL Intravenous Stopped 12/11/24 1432)   ondansetron (ZOFRAN) injection 4 mg (4 mg Intravenous Given 12/11/24 1317)     PROCEDURES:  Procedures:none      CRITICAL CARE TIME    Total Critical Care time was 0 minutes, excluding separately reportable procedures.   There was a high probability of clinically significant/life threatening deterioration in the patient's condition which required my urgent intervention.    FINAL IMPRESSION      1. Diarrhea, unspecified type    2. Nausea and vomiting, unspecified vomiting type        DISPOSITION/PLAN     ED Disposition       ED Disposition   Discharge    Condition   Stable    Comment   --             PATIENT REFERRED TO:  Provider, No Known  [place] [id number]      As needed    PATIENT CONNECTION - [place] [address]  [phone]    As needed      DISCHARGE MEDICATIONS:     Medication List        START taking these medications      promethazine 25 MG suppository  Commonly known as: PHENERGAN  Unwrap and insert 1 suppository into the rectum Every 6 (Six) Hours As Needed for Nausea or Vomiting.            CHANGE how you take these medications      * ondansetron 4 MG tablet  Commonly known as: ZOFRAN  Take 1 tablet by mouth Every 6 (Six) Hours As Needed.  What changed: Another medication with the same name was added. Make sure you understand how and when to take each.     * ondansetron 4 MG tablet  Commonly known as: ZOFRAN  Take 1 tablet by mouth Every 8 (Eight) Hours As Needed for Nausea or Vomiting.  What changed: You were already taking a medication with the same name, and this prescription was added. Make sure you understand how and when to  take each.           * This list has 2 medication(s) that are the same as other medications prescribed for you. Read the directions carefully, and ask your doctor or other care provider to review them with you.                CONTINUE taking these medications      cholecalciferol 25 MCG (1000 UT) tablet  Commonly known as: VITAMIN D3     Clenpiq 10-3.5-12 MG-GM -GM/160ML solution  Generic drug: Sod Picosulfate-Mag Ox-Cit Acd  Take 350 mL by mouth Take As Directed.     cyclobenzaprine 5 MG tablet  Commonly known as: FLEXERIL  Take 1-2 tablets by mouth 3 (Three) Times a Day As Needed for Muscle Spasms.     HYDROcodone-acetaminophen 5-325 MG per tablet  Commonly known as: NORCO  Take 1 tablet by mouth Every 8 (Eight) Hours As Needed.     propranolol 20 MG tablet  Commonly known as: INDERAL               Where to Get Your Medications        These medications were sent to [id number] - [place], [place] - [address] - [phone] [facility] [phone] [place]  [address] [address]      Phone: [phone]   ondansetron 4 MG tablet  promethazine 25 MG suppository         Comment: Please note this report has been produced using speech recognition software.      Jessie Sy PA-C

## 2024-12-13 LAB — BACTERIA SPEC AEROBE CULT: NORMAL

## 2025-01-27 ENCOUNTER — TRANSCRIBE ORDERS (OUTPATIENT)
Dept: ADMINISTRATIVE | Facility: HOSPITAL | Age: 50
End: 2025-01-27
Payer: OTHER GOVERNMENT

## 2025-01-27 DIAGNOSIS — Z12.31 VISIT FOR SCREENING MAMMOGRAM: Primary | ICD-10-CM

## 2025-03-24 ENCOUNTER — HOSPITAL ENCOUNTER (OUTPATIENT)
Dept: MAMMOGRAPHY | Facility: HOSPITAL | Age: 50
Discharge: HOME | End: 2025-03-24
Admitting: INTERNAL MEDICINE
Payer: OTHER GOVERNMENT

## 2025-03-24 DIAGNOSIS — Z12.31 VISIT FOR SCREENING MAMMOGRAM: ICD-10-CM

## 2025-03-24 PROCEDURE — 77067 SCR MAMMO BI INCL CAD: CPT

## 2025-03-24 PROCEDURE — 77063 BREAST TOMOSYNTHESIS BI: CPT

## 2025-04-01 ENCOUNTER — HOSPITAL ENCOUNTER (OUTPATIENT)
Facility: HOSPITAL | Age: 50
Discharge: HOME | End: 2025-04-01
Admitting: RADIOLOGY
Payer: OTHER GOVERNMENT

## 2025-04-01 DIAGNOSIS — R92.8 ABNORMAL MAMMOGRAM: ICD-10-CM

## 2025-04-01 PROCEDURE — 77062 BREAST TOMOSYNTHESIS BI: CPT | Performed by: RADIOLOGY

## 2025-04-01 PROCEDURE — G0279 TOMOSYNTHESIS, MAMMO: HCPCS

## 2025-04-01 PROCEDURE — 77066 DX MAMMO INCL CAD BI: CPT

## 2025-04-01 PROCEDURE — 77066 DX MAMMO INCL CAD BI: CPT | Performed by: RADIOLOGY
